# Patient Record
Sex: FEMALE | Race: BLACK OR AFRICAN AMERICAN | NOT HISPANIC OR LATINO | Employment: OTHER | URBAN - METROPOLITAN AREA
[De-identification: names, ages, dates, MRNs, and addresses within clinical notes are randomized per-mention and may not be internally consistent; named-entity substitution may affect disease eponyms.]

---

## 2022-07-10 ENCOUNTER — HOSPITAL ENCOUNTER (INPATIENT)
Facility: HOSPITAL | Age: 87
LOS: 5 days | Discharge: NURSING, REHAB OR LONG-TERM CARE | DRG: 389 | End: 2022-07-15
Attending: STUDENT IN AN ORGANIZED HEALTH CARE EDUCATION/TRAINING PROGRAM | Admitting: SURGERY
Payer: MEDICARE

## 2022-07-10 DIAGNOSIS — D72.829 LEUKOCYTOSIS, UNSPECIFIED TYPE: ICD-10-CM

## 2022-07-10 DIAGNOSIS — R79.89 ELEVATED TROPONIN: ICD-10-CM

## 2022-07-10 DIAGNOSIS — E87.1 HYPONATREMIA: ICD-10-CM

## 2022-07-10 DIAGNOSIS — K56.609 SBO (SMALL BOWEL OBSTRUCTION): ICD-10-CM

## 2022-07-10 DIAGNOSIS — I25.810 CORONARY ARTERY DISEASE INVOLVING CORONARY BYPASS GRAFT OF NATIVE HEART, UNSPECIFIED WHETHER ANGINA PRESENT: ICD-10-CM

## 2022-07-10 DIAGNOSIS — R00.0 TACHYARRHYTHMIA: ICD-10-CM

## 2022-07-10 DIAGNOSIS — E87.6 HYPOKALEMIA: ICD-10-CM

## 2022-07-10 DIAGNOSIS — R07.9 CHEST PAIN: ICD-10-CM

## 2022-07-10 DIAGNOSIS — I21.4 NSTEMI (NON-ST ELEVATED MYOCARDIAL INFARCTION): Primary | ICD-10-CM

## 2022-07-10 DIAGNOSIS — D64.9 NORMOCYTIC ANEMIA: ICD-10-CM

## 2022-07-10 LAB
ALBUMIN SERPL-MCNC: 3.6 GM/DL (ref 3.4–4.8)
ALBUMIN/GLOB SERPL: 1 RATIO (ref 1.1–2)
ALP SERPL-CCNC: 87 UNIT/L (ref 40–150)
ALT SERPL-CCNC: 27 UNIT/L (ref 0–55)
APTT PPP: 53.3 SECONDS
AST SERPL-CCNC: 32 UNIT/L (ref 5–34)
BASOPHILS # BLD AUTO: 0.02 X10(3)/MCL (ref 0–0.2)
BASOPHILS NFR BLD AUTO: 0.3 %
BILIRUBIN DIRECT+TOT PNL SERPL-MCNC: 0.7 MG/DL
BUN SERPL-MCNC: 17 MG/DL (ref 9.8–20.1)
CALCIUM SERPL-MCNC: 10.4 MG/DL (ref 8.4–10.2)
CHLORIDE SERPL-SCNC: 95 MMOL/L (ref 98–111)
CO2 SERPL-SCNC: 27 MMOL/L (ref 23–31)
CREAT SERPL-MCNC: 0.97 MG/DL (ref 0.55–1.02)
EOSINOPHIL # BLD AUTO: 0.06 X10(3)/MCL (ref 0–0.9)
EOSINOPHIL NFR BLD AUTO: 0.9 %
ERYTHROCYTE [DISTWIDTH] IN BLOOD BY AUTOMATED COUNT: 12.8 % (ref 11.5–17)
EST. AVERAGE GLUCOSE BLD GHB EST-MCNC: 99.7 MG/DL
GLOBULIN SER-MCNC: 3.5 GM/DL (ref 2.4–3.5)
GLUCOSE SERPL-MCNC: 110 MG/DL (ref 75–121)
HBA1C MFR BLD: 5.1 %
HCT VFR BLD AUTO: 32.2 % (ref 37–47)
HGB BLD-MCNC: 10.7 GM/DL (ref 12–16)
IMM GRANULOCYTES # BLD AUTO: 0.02 X10(3)/MCL (ref 0–0.04)
IMM GRANULOCYTES NFR BLD AUTO: 0.3 %
LACTATE SERPL-SCNC: 1 MMOL/L (ref 0.5–2.2)
LYMPHOCYTES # BLD AUTO: 1 X10(3)/MCL (ref 0.6–4.6)
LYMPHOCYTES NFR BLD AUTO: 15.6 %
MAGNESIUM SERPL-MCNC: 2.1 MG/DL (ref 1.6–2.6)
MCH RBC QN AUTO: 31.6 PG (ref 27–31)
MCHC RBC AUTO-ENTMCNC: 33.2 MG/DL (ref 33–36)
MCV RBC AUTO: 95 FL (ref 80–94)
MONOCYTES # BLD AUTO: 0.55 X10(3)/MCL (ref 0.1–1.3)
MONOCYTES NFR BLD AUTO: 8.6 %
NEUTROPHILS # BLD AUTO: 4.7 X10(3)/MCL (ref 2.1–9.2)
NEUTROPHILS NFR BLD AUTO: 74.3 %
NRBC BLD AUTO-RTO: 0 %
PHOSPHATE SERPL-MCNC: 3.7 MG/DL (ref 2.3–4.7)
PLATELET # BLD AUTO: 262 X10(3)/MCL (ref 130–400)
PMV BLD AUTO: 9.1 FL (ref 7.4–10.4)
POTASSIUM SERPL-SCNC: 4.1 MMOL/L (ref 3.5–5.1)
PROT SERPL-MCNC: 7.1 GM/DL (ref 5.8–7.6)
RBC # BLD AUTO: 3.39 X10(6)/MCL (ref 4.2–5.4)
SARS-COV-2 RDRP RESP QL NAA+PROBE: NEGATIVE
SODIUM SERPL-SCNC: 131 MMOL/L (ref 132–146)
TROPONIN I SERPL-MCNC: 0.17 NG/ML (ref 0–0.04)
TROPONIN I SERPL-MCNC: 0.19 NG/ML (ref 0–0.04)
TROPONIN I SERPL-MCNC: 0.2 NG/ML (ref 0–0.04)
WBC # SPEC AUTO: 6.4 X10(3)/MCL (ref 4.5–11.5)

## 2022-07-10 PROCEDURE — 25000003 PHARM REV CODE 250

## 2022-07-10 PROCEDURE — 63600175 PHARM REV CODE 636 W HCPCS: Performed by: STUDENT IN AN ORGANIZED HEALTH CARE EDUCATION/TRAINING PROGRAM

## 2022-07-10 PROCEDURE — 21400001 HC TELEMETRY ROOM

## 2022-07-10 PROCEDURE — 84484 ASSAY OF TROPONIN QUANT: CPT | Performed by: STUDENT IN AN ORGANIZED HEALTH CARE EDUCATION/TRAINING PROGRAM

## 2022-07-10 PROCEDURE — 25000242 PHARM REV CODE 250 ALT 637 W/ HCPCS: Performed by: STUDENT IN AN ORGANIZED HEALTH CARE EDUCATION/TRAINING PROGRAM

## 2022-07-10 PROCEDURE — 93005 ELECTROCARDIOGRAM TRACING: CPT

## 2022-07-10 PROCEDURE — 85730 THROMBOPLASTIN TIME PARTIAL: CPT | Performed by: STUDENT IN AN ORGANIZED HEALTH CARE EDUCATION/TRAINING PROGRAM

## 2022-07-10 PROCEDURE — C1751 CATH, INF, PER/CENT/MIDLINE: HCPCS

## 2022-07-10 PROCEDURE — 87635 SARS-COV-2 COVID-19 AMP PRB: CPT

## 2022-07-10 PROCEDURE — 83036 HEMOGLOBIN GLYCOSYLATED A1C: CPT | Performed by: STUDENT IN AN ORGANIZED HEALTH CARE EDUCATION/TRAINING PROGRAM

## 2022-07-10 PROCEDURE — 63600175 PHARM REV CODE 636 W HCPCS

## 2022-07-10 PROCEDURE — 84484 ASSAY OF TROPONIN QUANT: CPT

## 2022-07-10 PROCEDURE — 99291 CRITICAL CARE FIRST HOUR: CPT | Mod: 25

## 2022-07-10 PROCEDURE — 83735 ASSAY OF MAGNESIUM: CPT | Performed by: STUDENT IN AN ORGANIZED HEALTH CARE EDUCATION/TRAINING PROGRAM

## 2022-07-10 PROCEDURE — 94761 N-INVAS EAR/PLS OXIMETRY MLT: CPT

## 2022-07-10 PROCEDURE — 85610 PROTHROMBIN TIME: CPT | Performed by: STUDENT IN AN ORGANIZED HEALTH CARE EDUCATION/TRAINING PROGRAM

## 2022-07-10 PROCEDURE — 84100 ASSAY OF PHOSPHORUS: CPT | Performed by: STUDENT IN AN ORGANIZED HEALTH CARE EDUCATION/TRAINING PROGRAM

## 2022-07-10 PROCEDURE — 85025 COMPLETE CBC W/AUTO DIFF WBC: CPT | Performed by: STUDENT IN AN ORGANIZED HEALTH CARE EDUCATION/TRAINING PROGRAM

## 2022-07-10 PROCEDURE — 80053 COMPREHEN METABOLIC PANEL: CPT | Performed by: STUDENT IN AN ORGANIZED HEALTH CARE EDUCATION/TRAINING PROGRAM

## 2022-07-10 PROCEDURE — 36415 COLL VENOUS BLD VENIPUNCTURE: CPT | Performed by: STUDENT IN AN ORGANIZED HEALTH CARE EDUCATION/TRAINING PROGRAM

## 2022-07-10 PROCEDURE — 83605 ASSAY OF LACTIC ACID: CPT | Performed by: STUDENT IN AN ORGANIZED HEALTH CARE EDUCATION/TRAINING PROGRAM

## 2022-07-10 RX ORDER — ONDANSETRON 2 MG/ML
4 INJECTION INTRAMUSCULAR; INTRAVENOUS EVERY 6 HOURS PRN
Status: DISCONTINUED | OUTPATIENT
Start: 2022-07-10 | End: 2022-07-15 | Stop reason: HOSPADM

## 2022-07-10 RX ORDER — SODIUM CHLORIDE 0.9 % (FLUSH) 0.9 %
10 SYRINGE (ML) INJECTION EVERY 6 HOURS
Status: DISCONTINUED | OUTPATIENT
Start: 2022-07-11 | End: 2022-07-15 | Stop reason: HOSPADM

## 2022-07-10 RX ORDER — SODIUM CHLORIDE 0.9 % (FLUSH) 0.9 %
10 SYRINGE (ML) INJECTION EVERY 12 HOURS PRN
Status: DISCONTINUED | OUTPATIENT
Start: 2022-07-10 | End: 2022-07-15 | Stop reason: HOSPADM

## 2022-07-10 RX ORDER — FUROSEMIDE 10 MG/ML
40 INJECTION INTRAMUSCULAR; INTRAVENOUS DAILY
Status: DISCONTINUED | OUTPATIENT
Start: 2022-07-11 | End: 2022-07-10

## 2022-07-10 RX ORDER — NITROGLYCERIN 0.4 MG/1
0.4 TABLET SUBLINGUAL EVERY 5 MIN PRN
Status: DISCONTINUED | OUTPATIENT
Start: 2022-07-10 | End: 2022-07-12

## 2022-07-10 RX ORDER — MINOXIDIL 10 MG/1
2.5 TABLET ORAL DAILY
Status: ON HOLD | COMMUNITY
End: 2023-01-01

## 2022-07-10 RX ORDER — FUROSEMIDE 10 MG/ML
40 INJECTION INTRAMUSCULAR; INTRAVENOUS DAILY
Status: DISCONTINUED | OUTPATIENT
Start: 2022-07-10 | End: 2022-07-10

## 2022-07-10 RX ORDER — LOSARTAN POTASSIUM 25 MG/1
25 TABLET ORAL DAILY
COMMUNITY

## 2022-07-10 RX ORDER — FUROSEMIDE 40 MG/1
40 TABLET ORAL DAILY
Status: ON HOLD | COMMUNITY
End: 2023-01-01

## 2022-07-10 RX ORDER — FEXOFENADINE HCL AND PSEUDOEPHEDRINE HCI 180; 240 MG/1; MG/1
1 TABLET, EXTENDED RELEASE ORAL DAILY
Status: ON HOLD | COMMUNITY
End: 2023-01-01

## 2022-07-10 RX ORDER — SIMVASTATIN 40 MG/1
40 TABLET, FILM COATED ORAL NIGHTLY
COMMUNITY

## 2022-07-10 RX ORDER — HYDRALAZINE HYDROCHLORIDE 20 MG/ML
10 INJECTION INTRAMUSCULAR; INTRAVENOUS EVERY 6 HOURS PRN
Status: DISCONTINUED | OUTPATIENT
Start: 2022-07-10 | End: 2022-07-15 | Stop reason: HOSPADM

## 2022-07-10 RX ORDER — SODIUM CHLORIDE 9 MG/ML
INJECTION, SOLUTION INTRAVENOUS CONTINUOUS
Status: DISCONTINUED | OUTPATIENT
Start: 2022-07-10 | End: 2022-07-12

## 2022-07-10 RX ORDER — HEPARIN SODIUM 10000 [USP'U]/100ML
17 INJECTION, SOLUTION INTRAVENOUS CONTINUOUS
Status: DISCONTINUED | OUTPATIENT
Start: 2022-07-10 | End: 2022-07-11

## 2022-07-10 RX ORDER — SODIUM CHLORIDE 0.9 % (FLUSH) 0.9 %
10 SYRINGE (ML) INJECTION
Status: DISCONTINUED | OUTPATIENT
Start: 2022-07-10 | End: 2022-07-15 | Stop reason: HOSPADM

## 2022-07-10 RX ORDER — HEPARIN SODIUM,PORCINE/D5W 25000/250
0-40 INTRAVENOUS SOLUTION INTRAVENOUS CONTINUOUS
Status: DISCONTINUED | OUTPATIENT
Start: 2022-07-10 | End: 2022-07-11

## 2022-07-10 RX ADMIN — HYDRALAZINE HYDROCHLORIDE 10 MG: 20 INJECTION INTRAMUSCULAR; INTRAVENOUS at 08:07

## 2022-07-10 RX ADMIN — FUROSEMIDE 40 MG: 10 INJECTION, SOLUTION INTRAMUSCULAR; INTRAVENOUS at 04:07

## 2022-07-10 RX ADMIN — HEPARIN SODIUM 12 UNITS/KG/HR: 10000 INJECTION, SOLUTION INTRAVENOUS at 05:07

## 2022-07-10 RX ADMIN — NITROGLYCERIN 0.4 MG: 0.4 TABLET SUBLINGUAL at 11:07

## 2022-07-10 RX ADMIN — SODIUM CHLORIDE: 9 INJECTION, SOLUTION INTRAVENOUS at 11:07

## 2022-07-10 RX ADMIN — ONDANSETRON 4 MG: 2 INJECTION INTRAMUSCULAR; INTRAVENOUS at 09:07

## 2022-07-10 NOTE — H&P
[facility] Medicine Wards History & Physical Note     Resident Team: [facility] Medicine List 3  Attending Physician: Baltazar Ng Jr., *      Date of Admit: 7/10/2022    Chief Complaint     Transfer (Reports via [facility] from [facility] due to small bowel obstruction.)   for     Subjective:      89-year-old africain american  Female with PMH for CHF, CAD, HTN was transferred from an outside hospital to [facility] ED with small-bowel obstruction.  At the outside facility patient was found to have elevated troponin levels which we are not able to access.  Patient states that she has been having brown emesis and was unable to hold solid or liquid down. .  Patient states that she is still passing flatulencend had a bowel movement yesterday.  Patient denies fever, chills, light headedness, dizziness, chest pain, shortness a breath, abdominal pain, hematuria, hematochezia, or dysuria.      Past Medical History:    Hypertension  CHF  Glaucoma    Past Surgical History:    Total abdominal hysterectomy  Appendectomy  Cholecystectomy  CABG    Family History:  No family history on file.    Social History:      patient denies smoking for 40 years, no longer uses alcohol, and denies drug use    Allergies:  Review of patient's allergies indicates:  No Known Allergies    Home Medications:  Prior to Admission medications    Medication Sig Start Date End Date Taking? Authorizing Provider   fexofenadine-pseudoephedrine (ALLEGRA-D 24) 180-240 mg per 24 hr tablet Take 1 tablet by mouth once daily.    Historical Provider   furosemide (LASIX) 40 MG tablet Take 40 mg by mouth once daily.    Historical Provider   losartan (COZAAR) 25 MG tablet Take 25 mg by mouth once daily.    Historical Provider   minoxidiL (LONITEN) 10 MG Tab Take 2.5 mg by mouth once daily.    Historical Provider   simvastatin (ZOCOR) 40 MG tablet Take 40 mg by mouth every evening.    Historical Provider         Review of Systems:  Review of Systems     As above     Objective:    Last 24 Hour Vital Signs:  BP  Min: 111/78  Max: 162/64  Temp  Av.2 °F (36.8 °C)  Min: 98.2 °F (36.8 °C)  Max: 98.2 °F (36.8 °C)  Pulse  Av.6  Min: 84  Max: 106  Resp  Av.8  Min: 13  Max: 28  SpO2  Av.1 %  Min: 98 %  Max: 100 %  There is no height or weight on file to calculate BMI.  No intake/output data recorded.    Physical Examination:  Gen: NAD  HEENT: atraumatic, no scleral icterus  Neck: supple  Heart: RRR. No M/R/G.   Lungs: CTAB. No W/C/R.   Abd: soft, ND, NT. +BS  Extremities: No LE edema. Good pulses in all extremities.   MSK: No obvious deformities   Neuro: A,Ox3, responds to qns and follows simple commands appropriately.   Skin: No rash or wounds     Physical Exam     Laboratory:  Most Recent Data:  CBC:   Lab Results   Component Value Date    WBC 6.4 07/10/2022    HGB 10.7 (L) 07/10/2022    HCT 32.2 (L) 07/10/2022     07/10/2022    MCV 95.0 (H) 07/10/2022    RDW 12.8 07/10/2022     WBC Differential:   Recent Labs   Lab 07/10/22  0503   WBC 6.4   HGB 10.7*   HCT 32.2*      MCV 95.0*     BMP:   Lab Results   Component Value Date     (L) 07/10/2022    K 4.1 07/10/2022    CO2 27 07/10/2022    BUN 17.0 07/10/2022    CREATININE 0.97 07/10/2022    CALCIUM 10.4 (H) 07/10/2022    MG 2.10 07/10/2022    PHOS 3.7 07/10/2022     LFTs:   Lab Results   Component Value Date    ALBUMIN 3.6 07/10/2022    BILITOT 0.7 07/10/2022    AST 32 07/10/2022    ALKPHOS 87 07/10/2022    ALT 27 07/10/2022     Coags: No results found for: INR, PROTIME, PTT  FLP: No results found for: CHOL, HDL, LDLCALC, TRIG, CHOLHDL  DM:   Lab Results   Component Value Date    HGBA1C 5.1 07/10/2022    CREATININE 0.97 07/10/2022     Thyroid: No results found for: TSH, FREET4, M9LQMSH, W7XPIMA, THYROIDAB  Anemia: No results found for: IRON, TIBC, FERRITIN, YKYCSNMP92, FOLATE  Cardiac:   Lab Results   Component Value Date    TROPONINI 0.175 (H) 07/10/2022     Urinalysis: No results found for: LABURIN, COLORU,  PHUA, CLARITYU, SPECGRAV, LABSPEC, NITRITE, PROTEINUR, GLUCOSEU, KETONESU, UROBILINOGEN, BILIRUBINUR, BLOODU, RBCU, WBCUA    Trended Lab Data:  Recent Labs   Lab 07/10/22  0503   WBC 6.4   HGB 10.7*   HCT 32.2*      MCV 95.0*   RDW 12.8   *   K 4.1   CO2 27   BUN 17.0   CREATININE 0.97   ALBUMIN 3.6   BILITOT 0.7   AST 32   ALKPHOS 87   ALT 27       Trended Cardiac Data:  Recent Labs   Lab 07/10/22  0503 07/10/22  0949   TROPONINI 0.190* 0.175*           Radiology:  Imaging Results          X-Ray Abdomen AP 1 View (Final result)  Result time 07/10/22 09:26:56    Final result by Aurelio Wilkinson MD (07/10/22 09:26:56)                 Impression:      Nasogastric tube as above      Electronically signed by: Aurelio Wilkinson  Date:    07/10/2022  Time:    09:26             Narrative:    EXAMINATION:  XR ABDOMEN AP 1 VIEW    CLINICAL HISTORY:  tube placement;    TECHNIQUE:  AP X-RAY OF THE ABDOMEN:    CPT 63004    FINDINGS:  Examination reveals a nasogastric tube coiled over itself tip in the fundus of the stomach                                   Assessment & Plan:       Partial SBO  - managed by surgery team    NSTEMI  - 1.90  - trend troponin and EKG  - started heparin GTT    CODE STATUS:   Access: PIV none  Antibiotics: none  Diet: NPO  DVT Prophylaxis: heparin gtt  GI Prophylaxis:   Fluids:  NS

## 2022-07-10 NOTE — PT/OT/SLP PROGRESS
Physical Therapy  Missed Treatment Note    Patient Name:  Zackery Purdy   MRN:  [id number]    Patient not seen today secondary to Patient unwilling to participate, Other (Comment) (pt c/o fatigue and agreeable for therapy on following date). Will follow-up as scheduled.  Nurse kimberly notified.

## 2022-07-10 NOTE — CONSULTS
General/Acute Care Surgery   Consult     HD#0    HPI  89F transferred from OSH presenting w/ one day history of weakness, vomiting, epigastric abdominal pain, inability to keep solids/ liquids down. States that she had 2 solid BMs yesterday, and is presently still passing flatulence. Normally takes miralax and has daily bowel movements. OSH inserted NG tube, placed pt on maintenance fluids of 150mL/hr, administered 4 baby aspirin, and administered zofran for nausea. She states that she is ambulatory w/ RW and lives at home with brother. At this time, she is in no pain and denies fever/nausea/vomitting. Her last meal was 7/9/22.    Past Medical History:   HTN  CHF  Glaucoma  CABG    Surgical History:   Open MACARENA  Appendectomy  Cholecystectomy    Social History:   Remote alcohol and tobacco use (quit >40yrs ago)    Review of Systems: 10 point ROS negative except as stated in HPI    Allergies:  Amlodipine-urticaria    Home Meds:  Potassium qd 99mg  Allegra D qd 180-240mg   Furosemide qd 40mg  Simvastatin qd 40mg  Minoxidil qd 10mg  Losartan 25mg 4 tabs bid      Objective  Temp:  [98.2 °F (36.8 °C)] 98.2 °F (36.8 °C)  Pulse:  [] 106  Resp:  [14-27] 27  SpO2:  [99 %-100 %] 100 %  BP: (161-162)/(64-69) 162/64     No intake/output data recorded.  No intake/output data recorded.     GEN: NAD   NEURO: AAOx3   RESP: No increased WOB, equal rise and fall of the chest   ABD: Soft, non tender, non distended. No guarding or rebound. Midline incision scar present.   : Tolentino in place.   EXT: Moving all extremities spontaneously 2+ LLE edema 2/2 CABG graft harvest.     Labs  Recent Labs     07/10/22  0503   WBC 6.4   HGB 10.7*   HCT 32.2*        Recent Labs     07/10/22  0503   *   K 4.1   CO2 27   BUN 17.0   CREATININE 0.97   CALCIUM 10.4*   MG 2.10   PHOS 3.7   ALBUMIN 3.6   BILITOT 0.7   AST 32   ALKPHOS 87   ALT 27     From OSH:  Hb:10.9  WBC: 9.09  Na: 125  Troponin: 0.11 (0.12)  BUN: 18  Cr: 0.9  BNP:  "200      Imaging  CTabd/pelvis: Mechanical SBO with a zone of transition in the central to inferior midline abdomen. Extensive distal colonic diverticulosis and heavy atherosclerosis.      Assessment/Plan  89 y.o.female with NSTEMI, hyponatremia, and SBO.    - Continue NG tube on suction. Monitor drainage amount.  -NPO  - Order rolling walker and d/c Tolentino so that pt may ambulate to use restroom.  - Recommend admission to Medicine  - Will consider small bowel follow through after decompression    Kenroy Perez MD  [employer] General Surgery  7/10/2022  4:52 AM      PGY3 Addendum  Patient seen and examined with resident above.   Acute onset epigastric abdominal pain around 3pm yesterday followed by nausea/vomiting. She reports emesis looked "brown". Her last bowel movement yesterday prior to this episode. She previously had constipation for which she now takes miralax and is able to have daily soft bowel movements. She reports this feeling is different from the usual sensation of constipation.     NGT placed at outside hospital, stomach now decompressed on repeat abdominal film. 250ml gastric content in cannister.    Abdomen soft, NT  Prior surgical scars noted including lower midline    89 yo F with significant cardiac history presenting with elevated troponin and EKG changes. Patient also noted to have partial obstruction with transition point in the lower abdomen.     Continue NGT decompression  tentative plan for SBFT tomorrow.    Dilcia Sebastian    "

## 2022-07-10 NOTE — ED PROVIDER NOTES
Encounter Date: 7/10/2022       History     Chief Complaint   Patient presents with    Transfer     Reports via [facility] from [facility] due to small bowel obstruction.     89-year-old female presents to ED as transfer from outside hospital for SBO.  Patient states her only medical problems are hypertension and CHF. both well controlled on meds. States she felt fine until yesterday when she had multiple back-to-back episodes of nausea and vomiting.  Minimal and no abdominal pain.  At outside hospital she was tachycardic but otherwise vitals stable.  Gave single antiemetic which resolved nausea.  CT demonstrated SBO with transition point. NG tube placed. Labs demonstrate hyponatremia of 125, no leukocytosis, stable H&H, patient did have an elevated troponin initially of 0.12.  Performed Delta and down trending to 0.11. No chest pain or discomfort at any time. No shortness of breath, no fever, normal urination.  Aspirin given.  I was the accepting physician on the transfer call between myself, outside ER physician and our general surgeon Dr. Royal.  EMS states EN route patient was stable no intervention provided besides fluids.  On arrival pt states she feels tired but otherwise fine.  Not nauseated, continues to have no abdominal pain.  No other complaints or concerns at this time.        Review of patient's allergies indicates:  No Known Allergies  No past medical history on file.  No past surgical history on file.  No family history on file.     Review of Systems   Constitutional: Negative for chills, diaphoresis and fever.   HENT: Negative for congestion, rhinorrhea, sinus pain and sore throat.    Eyes: Negative for pain, discharge and itching.   Respiratory: Negative for cough, chest tightness and shortness of breath.    Cardiovascular: Negative for chest pain and palpitations.   Gastrointestinal: Negative for abdominal distention, abdominal pain, blood in stool, constipation, diarrhea, nausea, rectal pain and  vomiting.   Genitourinary: Negative for dysuria, flank pain and hematuria.   Musculoskeletal: Negative for back pain and myalgias.   Skin: Negative for color change and rash.   Neurological: Negative for dizziness, weakness and headaches.   Psychiatric/Behavioral: Negative for confusion. The patient is not hyperactive.        Physical Exam     Initial Vitals [07/10/22 0359]   BP Pulse Resp Temp SpO2   (!) 161/69 84 18 98.2 °F (36.8 °C) 99 %      MAP       --         Physical Exam    Vitals reviewed.  Constitutional: She appears well-developed and well-nourished. She is not diaphoretic. No distress.   HENT:   Head: Normocephalic and atraumatic.   Eyes: Conjunctivae and EOM are normal. Pupils are equal, round, and reactive to light.   Neck: Neck supple. No tracheal deviation present.   Normal range of motion.  Cardiovascular: Regular rhythm, normal heart sounds and intact distal pulses. Tachycardia present.    Pulmonary/Chest: Breath sounds normal. No respiratory distress.   Abdominal: Abdomen is soft and flat. There is no abdominal tenderness. There is no rebound, no guarding, no tenderness at McBurney's point and negative York's sign. negative Rovsing's sign  Musculoskeletal:         General: Normal range of motion.      Cervical back: Normal range of motion and neck supple.     Neurological: She is alert and oriented to person, place, and time. She has normal strength. GCS score is 15. GCS eye subscore is 4. GCS verbal subscore is 5. GCS motor subscore is 6.   Skin: Skin is warm and dry. Capillary refill takes less than 2 seconds. No rash noted.   Psychiatric: She has a normal mood and affect. Her behavior is normal. Judgment and thought content normal.         ED Course   Critical Care    Date/Time: 7/10/2022 4:57 PM  Performed by: Carlos Barreto MD  Authorized by: Carlos Barreto MD   Total critical care time (exclusive of procedural time) : 45 minutes  Critical care time was exclusive of separately billable  procedures and treating other patients.  Critical care was necessary to treat or prevent imminent or life-threatening deterioration of the following conditions: circulatory failure.  Critical care was time spent personally by me on the following activities: evaluation of patient's response to treatment, obtaining history from patient or surrogate, ordering and review of laboratory studies, pulse oximetry, review of old charts, development of treatment plan with patient or surrogate, examination of patient, ordering and performing treatments and interventions, ordering and review of radiographic studies and re-evaluation of patient's condition.  Comments: NSTEMI. Requested to update chart by EMR team. (anila)        Labs Reviewed   COMPREHENSIVE METABOLIC PANEL - Abnormal; Notable for the following components:       Result Value    Sodium Level 131 (*)     Chloride 95 (*)     Calcium Level Total 10.4 (*)     Albumin/Globulin Ratio 1.0 (*)     All other components within normal limits   CBC WITH DIFFERENTIAL - Abnormal; Notable for the following components:    RBC 3.39 (*)     Hgb 10.7 (*)     Hct 32.2 (*)     MCV 95.0 (*)     MCH 31.6 (*)     All other components within normal limits   MAGNESIUM - Normal   PHOSPHORUS - Normal   CBC W/ AUTO DIFFERENTIAL    Narrative:     The following orders were created for panel order CBC auto differential.  Procedure                               Abnormality         Status                     ---------                               -----------         ------                     CBC with Differential[[id number]]        Abnormal            Final result                 Please view results for these tests on the individual orders.   LACTIC ACID, PLASMA   TROPONIN I   EXTRA TUBES    Narrative:     The following orders were created for panel order EXTRA TUBES.  Procedure                               Abnormality         Status                     ---------                                -----------         ------                     Light Blue Top Hold[[id number]]                              In process                 Gold Top Hold[[id number]]                                    In process                   Please view results for these tests on the individual orders.   LIGHT BLUE TOP HOLD   GOLD TOP HOLD   EXTRA TUBES    Narrative:     The following orders were created for panel order EXTRA TUBES.  Procedure                               Abnormality         Status                     ---------                               -----------         ------                     Pink Top Hold[[id number]]                                    In process                   Please view results for these tests on the individual orders.   PINK TOP HOLD     EKG Readings: (Independently Interpreted)   Initial Reading: No STEMI. Rhythm: Sinus Tachycardia. Ectopy: No Ectopy. Conduction: Normal. Axis: Normal. Clinical Impression: Sinus Tachycardia Other Impression: mild ST depression inferior laterally       Imaging Results    None          Medications - No data to display  Medical Decision Making:   Clinical Tests:   Lab Tests: Reviewed and Ordered  Radiological Study: Reviewed and Ordered  Medical Tests: Reviewed and Ordered  ED Management:  Patient presents as transfer from OSH for SBO, NSTEMI, hyponatremia.  Given antiemetics fluids and aspirin there.  Accepted here by General surgery.  Upon patient arrival in no acute distress, comfortable, vital signs stable besides mild sinus tachycardia.  EKG shows no STEMI criteria but there is mild ST depression inferolaterally.  Outside hospital images uploaded demonstrate SBO with transition point.  Surgery notified and team presented bedside.  Medicine additionally notified secondary to comorbidities including downtrending troponin at outside hospital and hyponatremia. has NG tube in place on low intermittent suction.  Will transition care to surgical team. Remained stable at  this time. Updated family bedside (anila).             ED Course as of 07/10/22 0548   Sun Jul 10, 2022   0711 Spoke with both medicine then surgery. Both teams aware. [RZ]      ED Course User Index  [RZ] Carlos Barreto MD             Clinical Impression:   Final diagnoses:  [I21.4] NSTEMI (non-ST elevated myocardial infarction) (Primary)  [E87.1] Hyponatremia  [K56.609] SBO (small bowel obstruction)          ED Disposition Condition    Admit               Carlos Barreto MD  07/10/22 0552       Carlos Barreto MD  07/23/22 6520

## 2022-07-11 LAB
ALBUMIN SERPL-MCNC: 2.9 GM/DL (ref 3.4–4.8)
ALBUMIN/GLOB SERPL: 0.9 RATIO (ref 1.1–2)
ALP SERPL-CCNC: 70 UNIT/L (ref 40–150)
ALT SERPL-CCNC: 23 UNIT/L (ref 0–55)
ANION GAP SERPL CALC-SCNC: 9 MEQ/L
APPEARANCE UR: ABNORMAL
APTT PPP: 164.3 SECONDS
APTT PPP: 53.9 SECONDS
APTT PPP: >200 SECONDS
AST SERPL-CCNC: 29 UNIT/L (ref 5–34)
AV INDEX (PROSTH): 0.62
AV MEAN GRADIENT: 5 MMHG
AV PEAK GRADIENT: 10 MMHG
AV VALVE AREA: 2.4 CM2
AV VELOCITY RATIO: 0.69
BACTERIA #/AREA URNS AUTO: ABNORMAL /HPF
BASOPHILS # BLD AUTO: 0.02 X10(3)/MCL (ref 0–0.2)
BASOPHILS NFR BLD AUTO: 0.2 %
BILIRUB UR QL STRIP.AUTO: NEGATIVE MG/DL
BILIRUBIN DIRECT+TOT PNL SERPL-MCNC: 0.5 MG/DL
BSA FOR ECHO PROCEDURE: 1.9 M2
BUN SERPL-MCNC: 16.3 MG/DL (ref 9.8–20.1)
BUN SERPL-MCNC: 17.4 MG/DL (ref 9.8–20.1)
CALCIUM SERPL-MCNC: 9 MG/DL (ref 8.4–10.2)
CALCIUM SERPL-MCNC: 9.2 MG/DL (ref 8.4–10.2)
CAOX CRY URNS QL MICRO: ABNORMAL /HPF
CHLORIDE SERPL-SCNC: 99 MMOL/L (ref 98–111)
CHLORIDE SERPL-SCNC: 99 MMOL/L (ref 98–111)
CO2 SERPL-SCNC: 26 MMOL/L (ref 23–31)
CO2 SERPL-SCNC: 28 MMOL/L (ref 23–31)
COLOR UR AUTO: YELLOW
CREAT SERPL-MCNC: 0.75 MG/DL (ref 0.55–1.02)
CREAT SERPL-MCNC: 0.83 MG/DL (ref 0.55–1.02)
CREAT/UREA NIT SERPL: 21
DOP CALC AO PEAK VEL: 1.56 M/S
DOP CALC AO VTI: 31.2 CM
DOP CALC LVOT AREA: 3.9 CM2
DOP CALC LVOT DIAMETER: 2.23 CM
DOP CALC LVOT PEAK VEL: 1.07 M/S
DOP CALC LVOT STROKE VOLUME: 74.95 CM3
DOP CALC MV VTI: 24.3 CM
DOP CALCLVOT PEAK VEL VTI: 19.2 CM
E WAVE DECELERATION TIME: 160.07 MSEC
E/A RATIO: 0.65
E/E' RATIO: 15.67 M/S
EJECTION FRACTION: 70 %
EOSINOPHIL # BLD AUTO: 0.01 X10(3)/MCL (ref 0–0.9)
EOSINOPHIL NFR BLD AUTO: 0.1 %
ERYTHROCYTE [DISTWIDTH] IN BLOOD BY AUTOMATED COUNT: 13 % (ref 11.5–17)
FERRITIN SERPL-MCNC: 223.12 NG/ML (ref 4.63–204)
GLOBULIN SER-MCNC: 3.1 GM/DL (ref 2.4–3.5)
GLUCOSE SERPL-MCNC: 114 MG/DL (ref 75–121)
GLUCOSE SERPL-MCNC: 118 MG/DL (ref 75–121)
GLUCOSE UR QL STRIP.AUTO: NORMAL MG/DL
HCT VFR BLD AUTO: 28 % (ref 37–47)
HGB BLD-MCNC: 9.6 GM/DL (ref 12–16)
HYALINE CASTS #/AREA URNS LPF: ABNORMAL /LPF
IMM GRANULOCYTES # BLD AUTO: 0.07 X10(3)/MCL (ref 0–0.04)
IMM GRANULOCYTES NFR BLD AUTO: 0.6 %
IRON SATN MFR SERPL: 6 % (ref 20–50)
IRON SERPL-MCNC: 13 UG/DL (ref 50–170)
KETONES UR QL STRIP.AUTO: ABNORMAL MG/DL
LEFT ATRIUM SIZE: 4.23 CM
LEFT VENTRICLE DIASTOLIC VOLUME INDEX: 35.75 ML/M2
LEFT VENTRICLE DIASTOLIC VOLUME: 66.49 ML
LEFT VENTRICLE SYSTOLIC VOLUME INDEX: 11.5 ML/M2
LEFT VENTRICLE SYSTOLIC VOLUME: 21.48 ML
LEUKOCYTE ESTERASE UR QL STRIP.AUTO: 25 UNIT/L
LV LATERAL E/E' RATIO: 15.67 M/S
LV SEPTAL E/E' RATIO: 15.67 M/S
LVOT MG: 2.41 MMHG
LVOT MV: 0.71 CM/S
LYMPHOCYTES # BLD AUTO: 0.61 X10(3)/MCL (ref 0.6–4.6)
LYMPHOCYTES NFR BLD AUTO: 4.9 %
MAGNESIUM SERPL-MCNC: 1.7 MG/DL (ref 1.6–2.6)
MCH RBC QN AUTO: 32.3 PG (ref 27–31)
MCHC RBC AUTO-ENTMCNC: 34.3 MG/DL (ref 33–36)
MCV RBC AUTO: 94.3 FL (ref 80–94)
MONOCYTES # BLD AUTO: 0.9 X10(3)/MCL (ref 0.1–1.3)
MONOCYTES NFR BLD AUTO: 7.2 %
MUCOUS THREADS URNS QL MICRO: ABNORMAL /LPF
MV MEAN GRADIENT: 5 MMHG
MV PEAK A VEL: 1.45 M/S
MV PEAK E VEL: 0.94 M/S
MV PEAK GRADIENT: 10 MMHG
MV STENOSIS PRESSURE HALF TIME: 46.42 MS
MV VALVE AREA BY CONTINUITY EQUATION: 3.08 CM2
MV VALVE AREA P 1/2 METHOD: 4.74 CM2
NEUTROPHILS # BLD AUTO: 10.9 X10(3)/MCL (ref 2.1–9.2)
NEUTROPHILS NFR BLD AUTO: 87 %
NITRITE UR QL STRIP.AUTO: NEGATIVE
NRBC BLD AUTO-RTO: 0 %
PH UR STRIP.AUTO: 5.5 [PH]
PHOSPHATE SERPL-MCNC: 3.7 MG/DL (ref 2.3–4.7)
PLATELET # BLD AUTO: 237 X10(3)/MCL (ref 130–400)
PMV BLD AUTO: 9.2 FL (ref 7.4–10.4)
POTASSIUM SERPL-SCNC: 3.2 MMOL/L (ref 3.5–5.1)
POTASSIUM SERPL-SCNC: 4.1 MMOL/L (ref 3.5–5.1)
PROT SERPL-MCNC: 6 GM/DL (ref 5.8–7.6)
PROT UR QL STRIP.AUTO: ABNORMAL MG/DL
RA PRESSURE: 8 MMHG
RBC # BLD AUTO: 2.97 X10(6)/MCL (ref 4.2–5.4)
RBC UR QL AUTO: ABNORMAL UNIT/L
RIGHT VENTRICULAR END-DIASTOLIC DIMENSION: 2.92 CM
SODIUM SERPL-SCNC: 136 MMOL/L (ref 132–146)
SODIUM SERPL-SCNC: 138 MMOL/L (ref 132–146)
SP GR UR STRIP.AUTO: 1.01
SQUAMOUS #/AREA URNS LPF: ABNORMAL /HPF
TDI LATERAL: 0.06 M/S
TDI SEPTAL: 0.06 M/S
TDI: 0.06 M/S
TIBC SERPL-MCNC: 214 UG/DL (ref 70–310)
TIBC SERPL-MCNC: 227 UG/DL (ref 250–450)
TRANSFERRIN SERPL-MCNC: 196 MG/DL
TROPONIN I SERPL-MCNC: 0.5 NG/ML (ref 0–0.04)
TROPONIN I SERPL-MCNC: 0.59 NG/ML (ref 0–0.04)
TROPONIN I SERPL-MCNC: 0.74 NG/ML (ref 0–0.04)
UROBILINOGEN UR STRIP-ACNC: NORMAL MG/DL
WBC # SPEC AUTO: 12.5 X10(3)/MCL (ref 4.5–11.5)
WBC #/AREA URNS AUTO: >100 /HPF
YEAST BUDDING URNS QL: ABNORMAL /HPF

## 2022-07-11 PROCEDURE — 85730 THROMBOPLASTIN TIME PARTIAL: CPT | Performed by: STUDENT IN AN ORGANIZED HEALTH CARE EDUCATION/TRAINING PROGRAM

## 2022-07-11 PROCEDURE — 82728 ASSAY OF FERRITIN: CPT | Performed by: STUDENT IN AN ORGANIZED HEALTH CARE EDUCATION/TRAINING PROGRAM

## 2022-07-11 PROCEDURE — 84100 ASSAY OF PHOSPHORUS: CPT | Performed by: STUDENT IN AN ORGANIZED HEALTH CARE EDUCATION/TRAINING PROGRAM

## 2022-07-11 PROCEDURE — 25000003 PHARM REV CODE 250: Performed by: STUDENT IN AN ORGANIZED HEALTH CARE EDUCATION/TRAINING PROGRAM

## 2022-07-11 PROCEDURE — A4216 STERILE WATER/SALINE, 10 ML: HCPCS | Performed by: STUDENT IN AN ORGANIZED HEALTH CARE EDUCATION/TRAINING PROGRAM

## 2022-07-11 PROCEDURE — C9113 INJ PANTOPRAZOLE SODIUM, VIA: HCPCS | Performed by: STUDENT IN AN ORGANIZED HEALTH CARE EDUCATION/TRAINING PROGRAM

## 2022-07-11 PROCEDURE — 36415 COLL VENOUS BLD VENIPUNCTURE: CPT | Performed by: STUDENT IN AN ORGANIZED HEALTH CARE EDUCATION/TRAINING PROGRAM

## 2022-07-11 PROCEDURE — 25000003 PHARM REV CODE 250

## 2022-07-11 PROCEDURE — 80053 COMPREHEN METABOLIC PANEL: CPT | Performed by: STUDENT IN AN ORGANIZED HEALTH CARE EDUCATION/TRAINING PROGRAM

## 2022-07-11 PROCEDURE — 63600175 PHARM REV CODE 636 W HCPCS

## 2022-07-11 PROCEDURE — 81001 URINALYSIS AUTO W/SCOPE: CPT | Performed by: STUDENT IN AN ORGANIZED HEALTH CARE EDUCATION/TRAINING PROGRAM

## 2022-07-11 PROCEDURE — 63600175 PHARM REV CODE 636 W HCPCS: Performed by: STUDENT IN AN ORGANIZED HEALTH CARE EDUCATION/TRAINING PROGRAM

## 2022-07-11 PROCEDURE — 83735 ASSAY OF MAGNESIUM: CPT | Performed by: STUDENT IN AN ORGANIZED HEALTH CARE EDUCATION/TRAINING PROGRAM

## 2022-07-11 PROCEDURE — 36415 COLL VENOUS BLD VENIPUNCTURE: CPT | Performed by: SURGERY

## 2022-07-11 PROCEDURE — 25000003 PHARM REV CODE 250: Performed by: SURGERY

## 2022-07-11 PROCEDURE — 87088 URINE BACTERIA CULTURE: CPT | Performed by: STUDENT IN AN ORGANIZED HEALTH CARE EDUCATION/TRAINING PROGRAM

## 2022-07-11 PROCEDURE — 84484 ASSAY OF TROPONIN QUANT: CPT | Performed by: STUDENT IN AN ORGANIZED HEALTH CARE EDUCATION/TRAINING PROGRAM

## 2022-07-11 PROCEDURE — A4216 STERILE WATER/SALINE, 10 ML: HCPCS

## 2022-07-11 PROCEDURE — 83540 ASSAY OF IRON: CPT | Performed by: STUDENT IN AN ORGANIZED HEALTH CARE EDUCATION/TRAINING PROGRAM

## 2022-07-11 PROCEDURE — C9113 INJ PANTOPRAZOLE SODIUM, VIA: HCPCS

## 2022-07-11 PROCEDURE — 94761 N-INVAS EAR/PLS OXIMETRY MLT: CPT

## 2022-07-11 PROCEDURE — 97162 PT EVAL MOD COMPLEX 30 MIN: CPT

## 2022-07-11 PROCEDURE — 21400001 HC TELEMETRY ROOM

## 2022-07-11 PROCEDURE — 85025 COMPLETE CBC W/AUTO DIFF WBC: CPT | Performed by: STUDENT IN AN ORGANIZED HEALTH CARE EDUCATION/TRAINING PROGRAM

## 2022-07-11 PROCEDURE — 97166 OT EVAL MOD COMPLEX 45 MIN: CPT

## 2022-07-11 PROCEDURE — 85730 THROMBOPLASTIN TIME PARTIAL: CPT | Performed by: SURGERY

## 2022-07-11 RX ORDER — POTASSIUM CHLORIDE 7.45 MG/ML
10 INJECTION INTRAVENOUS
Status: DISCONTINUED | OUTPATIENT
Start: 2022-07-11 | End: 2022-07-11

## 2022-07-11 RX ORDER — POTASSIUM CHLORIDE 20 MEQ/1
40 TABLET, EXTENDED RELEASE ORAL ONCE
Status: DISCONTINUED | OUTPATIENT
Start: 2022-07-11 | End: 2022-07-11

## 2022-07-11 RX ORDER — POTASSIUM CHLORIDE 7.45 MG/ML
20 INJECTION INTRAVENOUS ONCE
Status: DISCONTINUED | OUTPATIENT
Start: 2022-07-11 | End: 2022-07-11

## 2022-07-11 RX ORDER — MUPIROCIN 20 MG/G
OINTMENT TOPICAL 2 TIMES DAILY
Status: DISCONTINUED | OUTPATIENT
Start: 2022-07-11 | End: 2022-07-15 | Stop reason: HOSPADM

## 2022-07-11 RX ORDER — POTASSIUM CHLORIDE 7.45 MG/ML
10 INJECTION INTRAVENOUS
Status: COMPLETED | OUTPATIENT
Start: 2022-07-11 | End: 2022-07-11

## 2022-07-11 RX ORDER — ACETAMINOPHEN 500 MG
500 TABLET ORAL ONCE
Status: COMPLETED | OUTPATIENT
Start: 2022-07-11 | End: 2022-07-11

## 2022-07-11 RX ORDER — ASPIRIN 81 MG/1
81 TABLET ORAL DAILY
Status: DISCONTINUED | OUTPATIENT
Start: 2022-07-12 | End: 2022-07-15 | Stop reason: HOSPADM

## 2022-07-11 RX ORDER — PANTOPRAZOLE SODIUM 40 MG/10ML
40 INJECTION, POWDER, LYOPHILIZED, FOR SOLUTION INTRAVENOUS 2 TIMES DAILY
Status: DISCONTINUED | OUTPATIENT
Start: 2022-07-11 | End: 2022-07-14

## 2022-07-11 RX ORDER — MAGNESIUM SULFATE 1 G/100ML
1 INJECTION INTRAVENOUS ONCE
Status: COMPLETED | OUTPATIENT
Start: 2022-07-11 | End: 2022-07-11

## 2022-07-11 RX ADMIN — SODIUM CHLORIDE: 9 INJECTION, SOLUTION INTRAVENOUS at 12:07

## 2022-07-11 RX ADMIN — HYDRALAZINE HYDROCHLORIDE 10 MG: 20 INJECTION INTRAMUSCULAR; INTRAVENOUS at 04:07

## 2022-07-11 RX ADMIN — MUPIROCIN: 20 OINTMENT TOPICAL at 08:07

## 2022-07-11 RX ADMIN — SODIUM CHLORIDE: 9 INJECTION, SOLUTION INTRAVENOUS at 03:07

## 2022-07-11 RX ADMIN — POTASSIUM CHLORIDE 10 MEQ: 7.45 INJECTION INTRAVENOUS at 06:07

## 2022-07-11 RX ADMIN — HYDRALAZINE HYDROCHLORIDE 10 MG: 20 INJECTION INTRAMUSCULAR; INTRAVENOUS at 11:07

## 2022-07-11 RX ADMIN — POTASSIUM CHLORIDE 10 MEQ: 7.45 INJECTION INTRAVENOUS at 01:07

## 2022-07-11 RX ADMIN — POTASSIUM CHLORIDE 10 MEQ: 7.45 INJECTION INTRAVENOUS at 11:07

## 2022-07-11 RX ADMIN — PANTOPRAZOLE SODIUM 40 MG: 40 INJECTION, POWDER, FOR SOLUTION INTRAVENOUS at 08:07

## 2022-07-11 RX ADMIN — POTASSIUM CHLORIDE 10 MEQ: 7.45 INJECTION INTRAVENOUS at 08:07

## 2022-07-11 RX ADMIN — TAZOBACTAM SODIUM AND PIPERACILLIN SODIUM 4.5 G: 500; 4 INJECTION, SOLUTION INTRAVENOUS at 07:07

## 2022-07-11 RX ADMIN — MAGNESIUM SULFATE IN DEXTROSE 1 G: 10 INJECTION, SOLUTION INTRAVENOUS at 08:07

## 2022-07-11 RX ADMIN — SODIUM CHLORIDE 125 MG: 9 INJECTION, SOLUTION INTRAVENOUS at 03:07

## 2022-07-11 RX ADMIN — SODIUM CHLORIDE, PRESERVATIVE FREE 10 ML: 5 INJECTION INTRAVENOUS at 05:07

## 2022-07-11 RX ADMIN — SODIUM CHLORIDE, PRESERVATIVE FREE 10 ML: 5 INJECTION INTRAVENOUS at 12:07

## 2022-07-11 RX ADMIN — TAZOBACTAM SODIUM AND PIPERACILLIN SODIUM 4.5 G: 500; 4 INJECTION, SOLUTION INTRAVENOUS at 11:07

## 2022-07-11 RX ADMIN — POTASSIUM CHLORIDE 10 MEQ: 7.45 INJECTION INTRAVENOUS at 07:07

## 2022-07-11 RX ADMIN — POTASSIUM CHLORIDE 10 MEQ: 7.45 INJECTION INTRAVENOUS at 10:07

## 2022-07-11 RX ADMIN — ACETAMINOPHEN 500 MG: 500 TABLET ORAL at 11:07

## 2022-07-11 NOTE — PT/OT/SLP EVAL
"Occupational Therapy   Evaluation    Name: Zackery Purdy  MRN: [id number]  Admitting Diagnosis:    Patient Active Problem List   Diagnosis    SBO (small bowel obstruction)    NSTEMI (non-ST elevated myocardial infarction)    Hyponatremia    Elevated troponin    Tachyarrhythmia    Hypokalemia    Normocytic anemia    Coronary artery disease involving coronary bypass graft of native heart       Recent Surgery: * No surgery found *      Recommendations:     Discharge Recommendations: nursing facility, skilled  Discharge Equipment Recommendations:  none  Barriers to discharge:  None    Assessment:     Zackery Purdy is a 89 y.o. female with a medical diagnosis of   Patient Active Problem List   Diagnosis    SBO (small bowel obstruction)    NSTEMI (non-ST elevated myocardial infarction)    Hyponatremia    Elevated troponin    Tachyarrhythmia    Hypokalemia    Normocytic anemia    Coronary artery disease involving coronary bypass graft of native heart   .  She presents with functional decline. Performance deficits affecting function: weakness, impaired endurance, impaired functional mobilty, impaired balance, impaired self care skills.      Rehab Prognosis: Good; patient would benefit from acute skilled OT services to address these deficits and reach maximum level of function.       Plan:     Patient to be seen  (2-5 times per week, M-F) to address the above listed problems via self-care/home management, therapeutic activities, therapeutic exercises  · Plan of Care Expires:  (D/C)  · Plan of Care Reviewed with: patient    Subjective     Chief Complaint: pain  Patient/Family Comments/goals: none stated    Occupational Profile:  Living Environment:  Pt lives with her brother, in home, 3 steps to enter, B rails.  Pt does have walk in shower with rails and shower chair.  Previous level of function: Pt reported she doesn't drive, does require "little" assistance with self care skills, family A with home making " tasks, meal prep.  She stated she doesn't go grocery shopping.  Equipment Used at Home:  walker, rolling, bedside commode, shower chair, cane, straight  Assistance upon Discharge: family as needed    Pain/Comfort:  · Pain Rating 1: 2/10  · Location - Side 1: Left  · Location - Orientation 1: lower  · Location 1: leg  · Pain Addressed 1: Nurse notified  · Pain Rating Post-Intervention 1: 2/10  · Location 2:  (NG tube)  · Pain Addressed 2: Nurse notified    Patients cultural, spiritual, Restorationist conflicts given the current situation: no    Objective:     Communicated with: nurse Quinteros prior to session.  Patient found supine with NG tube, telemetry, peripheral IV upon OT entry to room.    General Precautions: Standard, fall   Orthopedic Precautions:N/A   Braces: N/A  Respiratory Status: Room air    Occupational Performance:    Bed Mobility:    · Patient completed Rolling/Turning to Left with  minimum assistance  · Patient completed Rolling/Turning to Right with minimum assistance  · Patient completed Scooting with minimum assistance while standing and use of RW  · Patient completed Supine to Sit with minimum assistance  · Patient completed Sit to Supine with minimum assistance    Functional Mobility/Transfers:  · Patient completed Sit <> Stand Transfer with minimum assistance  with  rolling walker     Activities of Daily Living:  · Lower Body Dressing: maximal assistance seated on EOB, donning B slipper socks    Cognitive/Visual Perceptual:  Visual/Perceptual:      -pt reported she has glaucoma .    Physical Exam:  Balance:  Pt with I static sitting balance, mod I with dynamic sitting balance.  Pt does require RW for dynamic standing balance.  Dominant hand:  R  Upper Extremity Strength:    -       Right Upper Extremity: WFL  -       Left Upper Extremity: WFL    Education:    Patient left sitting edge of bed with all lines intact, call button in reach and nurse Quinteros notified    GOALS:   Multidisciplinary  Problems     Occupational Therapy Goals        Problem: Occupational Therapy    Goal Priority Disciplines Outcome Interventions   Occupational Therapy Goal     OT, PT/OT Ongoing, Progressing    Description: Goals to be met by: d/c    Patient will increase functional independence with ADLs by performing:    LE Dressing with Modified [place].    Grooming while standing with Modified [place].    Rolling to Bilateral with [place].     Supine to sit with [place].    Stand pivot transfers with Modified [place].                     History:     No past medical history on file.    No past surgical history on file.    Time Tracking:     OT Date of Treatment: 07/11/22  OT Start Time: 0913  OT Stop Time: 0938  OT Total Time (min): 25 min    Billable Minutes:Evaluation 25 min    7/11/2022

## 2022-07-11 NOTE — PT/OT/SLP EVAL
Physical Therapy Evaluation    Patient Name:  Zackery Purdy   MRN:  [id number]    Recommendations:     Discharge Recommendations:  other (see comments) (home w/ responsible caregiver w/ HH)   Discharge Equipment Recommendations: shower chair, walker, rolling   Barriers to discharge: None    Assessment:     Zackery Purdy is a 89 y.o. female admitted with a medical diagnosis of    Partial SBO  NSTEMI Type II   Leukocytosis  Hypokalemia   Normocytic Anemia    Patient Active Problem List   Diagnosis    SBO (small bowel obstruction)    NSTEMI (non-ST elevated myocardial infarction)    Hyponatremia    Elevated troponin    Tachyarrhythmia    Hypokalemia    Normocytic anemia    Coronary artery disease involving coronary bypass graft of native heart     She presents with the following impairments/functional limitations:  weakness, impaired endurance, impaired self care skills, impaired functional mobilty, gait instability, impaired balance.    Rehab Prognosis: Good; patient would benefit from acute skilled PT services to address these deficits and reach maximum level of function.    -continued supervised/assisted OOB and edge of bed side steps w/ progression to out-of-room ambulation w/ RW w/ progression as tolerated/appropriate    Recent Surgery: * No surgery found *      Plan:     During this hospitalization, patient to be seen  (3-5 TIMES/WEEK) to address the identified rehab impairments via gait training, therapeutic activities, therapeutic exercises and progress toward the following goals:    · Plan of Care Expires:       Subjective     Chief Complaint: pain complaints as documented  Patient/Family Comments/goals: return home to OF  Pain/Comfort:  · Pain Rating 1: 2/10 (throat and Lt foot - pre-activity)  · Pain Addressed 1: Nurse notified  · Pain Rating Post-Intervention 1: 2/10 (post-activity)    Patients cultural, spiritual, Taoist conflicts given the current situation: no    Living Environment:  -home  w/ pt's brother in single level home w/ 2 steps outside w/ bilat handrail w/ walk-in-shower.    Prior to admission, patients level of function was intermittent assist w/ ADL's.  Equipment used at home: rolling walker and shower chair.  DME owned (not currently used): single point cane and bedside commode.  Upon discharge, patient will have assistance from pt's brother.    -patient denied - extremity tingling/numbness, lightheadedness/dizziness, swallowing difficulty (previous)    -patient complaints of pain - as documented and vision impairment    -patient is Rt hand dominant    Objective:     Communicated with pt's nurse Neli prior to session.  Patient found supine in bed w/ HOB elevated w/ HOB rails up with telemetry, peripheral IV, NG tube  upon PT entry to room.    OT Jamia and PM&R TECH present for evaluation treatment session.    General Precautions: Standard, other (see comments) (standard)   Orthopedic Precautions:Full weight bearing   Braces: N/A  Respiratory Status: Room air    Exams:  · Cognitive Exam:  Patient is oriented to Person, Place, Time and Situation  · Gross Motor Coordination:  bilat LE - toe taps - WFL's  · Sensation:    · -       Intact  light/touch bilat LE - distal feet and toes  · RLE ROM: WFL  · RLE Strength: WFL  · LLE ROM: WFL  · LLE Strength: WFL    Functional Mobility:  · Bed Mobility:     · Rolling Right: supervision  · Scooting: contact guard assistance  · Supine to Sit: contact guard assistance  -sat on edge of bed  -donned 2nd back gown  -pt's nurse Neli into room to draw lab from pt's PICC Rt UE  · Transfers:     · Sit to Stand:  contact guard assistance with rolling walker  · Gait: contact guard assistance w/ rolling walker  · -side steps to HOB (pt's Rt) x3ft  · -slowed/guarded mvmts  · -decreased bilat step length  · -assist w/ rolling walker for advancement    *CNA and PM&R TECH assisted w/ bed sheet change during standing activities    Balance Training  Static  Sitting:  Patient performed static sitting on level surface  using no device with [place] and no verbal cues.     Dynamic Sitting:  Patient performed dynamic sitting on level surface using no device with [place] and no verbal cues during moderate excursions.    Balance Training  Static Standing:  Patient performed static standing on level surface  using rolling walker with Modified [place] and no verbal cues.      07/11/22  Supine  Pre-activity 07/11/22  Sitting edge of bed  post-activity   BP: 159/67 118/70   Pulse: 83 65   SpO2: 76% 85%         Patient left sitting edge of bed with all lines intact, call button in reach, pt's nurse Neli notified and tray table at bedside.    GOALS:   Multidisciplinary Problems     Physical Therapy Goals        Problem: Physical Therapy    Goal Priority Disciplines Outcome Goal Variances Interventions   Physical Therapy Goal     PT, PT/OT      Description: Goals to be met by: DISCHARGE     Patient will increase functional independence with mobility by performing:    -. Supine to sit with Stand-by Assistance  -. Sit to supine with Stand-by Assistance  -. Rolling to Left and Right with Modified [place]  -. Sit to stand transfer with Supervision  -. Gait  x 65ft x2 with Supervision using Rolling Walker  -. Ascend/descend 3 stair with bilateral Handrails Supervision using No Assistive Device                     History:     No past medical history on file.    No past surgical history on file.    Time Tracking:     PT Received On: 07/11/22  PT Start Time: 0903     PT Stop Time: 0931  PT Total Time (min): 28 min     Billable Minutes: Evaluation 28 07/11/2022

## 2022-07-11 NOTE — PROGRESS NOTES
[facility] - Purple Team  Daily Progress Note    Patient ID: Zackery Purdy     [facility] Day: 2    Subjective:  Pt reports that she is still passing flatulence as of this morning, but no BMs. She noted that she had some epigastric pain earlier in the night but it has since resolved. She did have an episode of non-bloody small amount of emesis last night as well.    Objective:  Problem List:  Patient Active Problem List   Diagnosis    SBO (small bowel obstruction)       Vitals:  Temp:  [98.2 °F (36.8 °C)-99.5 °F (37.5 °C)] 99.5 °F (37.5 °C)  Pulse:  [] 109  Resp:  [13-28] 20  SpO2:  [94 %-100 %] 97 %  BP: ()/(47-85) 169/68      Labs:  Recent Labs   Lab 07/10/22  0503 07/11/22  0411   HGB 10.7* 9.6*   WBC 6.4 12.5*    237   BUN 17.0 16.3   CREATININE 0.97 0.75   MG 2.10  --    K 4.1 3.2*   }  Troponins: 0.594 (0.190)    Physical Exam:  Gen: NAD  Neuro: awake, alert, answering questions appropriately  Resp: non-labored breathing, INDIGO  Abd: soft, ND, NT  Ext: moves all 4 spontaneously and purposefully  Skin: warm, well perfused    Imaging:  CTabd/pelvis: Mechanical SBO with a zo`ne of transition in the central to inferior midline abdomen. Extensive distal colonic diverticulosis and heavy atherosclerosis.   CXR 7/10/22: PICC line and NG tube in proper placement      Active Medications:    Current Facility-Administered Medications:     0.9%  NaCl infusion, , Intravenous, Continuous, Kenroy Perez MD, Last Rate: 75 mL/hr at 07/11/22 0031, New Bag at 07/11/22 0031    hydrALAZINE injection 10 mg, 10 mg, Intravenous, Q6H PRN, Kenroy Perez MD, 10 mg at 07/11/22 0432    mupirocin 2 % ointment, , Nasal, BID, Baltazar Ng Jr., MD    nitroGLYCERIN SL tablet 0.4 mg, 0.4 mg, Sublingual, Q5 Min PRN, Mekha Octavio, DO, 0.4 mg at 07/10/22 2323    ondansetron injection 4 mg, 4 mg, Intravenous, Q6H PRN, Kenroy Perez MD, 4 mg at 07/10/22 2116    potassium chloride 10 mEq in 100 mL IVPB, 10  mEq, Intravenous, Q1H, Radames Loomis, DO    sodium chloride 0.9% flush 10 mL, 10 mL, Intravenous, Q12H PRN, Pavan Cunningham, DO    Flushing PICC Protocol, , , Until Discontinued **AND** sodium chloride 0.9% flush 10 mL, 10 mL, Intravenous, Q6H, 10 mL at 07/11/22 0522 **AND** sodium chloride 0.9% flush 10 mL, 10 mL, Intravenous, PRN, Qiana Iglesias MD     Assessment/Plan:  89 y.o.female with NSTEMI, SBO.    - Spoke with medicine about calling cardiology about most recent troponin of 0.594.   - Put in inpt consult to cardiology  - continue NG tube to suction, NPO.    Kenroy Perez MD   [facility], PGY1  07/11/2022 6:22 AM       PGY3 addendum  Overnight patient had acute elevation in heart rate up to 120s with new onset epigastric/substernal chest pain. A repeat EKG showed worsening ST depression. The medicine team evaluated the patient bedside and notified cardiology. This morning her pain has resolved and HR in 100s. However troponin elevated up to 0.594    NGT output >1.5L, still passing gas, no BM. Abdomen soft, NT    Added protonix 40mg IV BID  IV Electrolyte replacements to goal K>4, Phos>3, Mg>2  Will continue decompression in light of cardiac issues  Discussed plan with medicine team this morning, they planned to notify cardiology. Appreciate assistance    Dilcia Sebastian

## 2022-07-11 NOTE — PROGRESS NOTES
[facility] INTERNAL MEDICINE  INPATIENT PROGRESS NOTE    Resident Team: [facility] Medicine List 3  Attending Physician: Dr. Ramiro Carpenter MD    SUBJECTIVE:      HPI: Ms. Purdy is a 89-year-old africain american  Female with PMH for CHF (unquantifed), CAD s/p CABG, HTN was transferred from an outside hospital to [facility] ED with small-bowel obstruction on 07/10/22.  At the outside facility patient was found to have elevated troponin levels, howver the outside medical records were unavailable. Patient states that she has been having brown emesis and was unable to hold solid or liquid down. .  Patient states that she is still passing flatulence had a bowel movement the day before admission. Patient denied fever, chills, light headedness, dizziness, chest pain, shortness a breath, abdominal pain, hematuria, hematochezia, or dysuria.    Today: Overnight, the patient had an episode of chest pain and an EKG showed ST depressions in the inferior leads and V2, V3, V4, V5, V6, and AVL with no ST segment elevations. The depressions were similar to the prior EKG yesterday. [facility] was consulted for recommended trending troponins and have cardiology evaluate today. This morning she reports no chest pain. She states her mouth is dry and would like some swabs to wet her mouth.    ROS:   CONSTITUTIONAL: No weight loss, subjective fever, chills, weakness or fatigue.  HEENT: Eyes: No visual loss, blurred vision, double vision or yellow sclerae. Ears, Nose, + dry mouth Throat: No hearing loss, sneezing, congestion, runny nose or sore throat.  SKIN: No rash or itching.  CARDIOVASCULAR: No chest pain, chest pressure or chest discomfort. No palpitations or edema.  RESPIRATORY: No shortness of breath, cough or sputum.  GASTROINTESTINAL: No anorexia, nausea, vomiting or diarrhea. No abdominal pain or blood.  GENITOURINARY: No dysuria, hematuria, or incontinence  NEUROLOGICAL: No headache, dizziness, syncope, paralysis, ataxia, numbness or tingling in the  "extremities. No change in bowel or bladder control.  MUSCULOSKELETAL: No muscle, back pain, joint pain or stiffness.  HEMATOLOGIC: No anemia, bleeding or bruising.  LYMPHATICS: No enlarged nodes.  PSYCHIATRIC: No history of depression or anxiety.  ENDOCRINOLOGIC: No reports of sweating, cold or heat intolerance. No polyuria or polydipsia.  ALLERGIES: No history of asthma, hives, eczema or rhinitis.         OBJECTIVE:     Vital signs:   BP (!) 169/68   Pulse 109   Temp 99.5 °F (37.5 °C) (Oral)   Resp 20   Ht 5' 5" (1.651 m)   Wt 79.1 kg (174 lb 6.1 oz)   SpO2 97%   BMI 29.02 kg/m²      Physical Examination:  General: elderly w/o distress  HEENT: NC/AT; PERRLA; nasal and oral mucosa moist and clear; no sinus tenderness; no thyromegaly, NG tube in place on suction  Neck: Full ROM; no lymphadenopathy  Pulm: CTA bilaterally, normal work of breathing  CV: S1, S2 w/o murmurs or gallops; no edema noted  GI: Soft with normal bowel sounds in all quadrants, no masses on palpation, hyperactive bowel sounds  MSK: Full ROM of all extremities and spine w/o limitation or discomfort  Derm: No rashes, abnormal bruising, or skin lesions  Neuro: AAOx4; CN II-XII intact; motor/sensory function intact  Psych: Cooperative; appropriate mood and affect    Laboratory:  Most Recent Data:  CBC:   Lab Results   Component Value Date    WBC 12.5 (H) 07/11/2022    HGB 9.6 (L) 07/11/2022    HCT 28.0 (L) 07/11/2022     07/11/2022    MCV 94.3 (H) 07/11/2022    RDW 13.0 07/11/2022     WBC Differential:   Recent Labs   Lab 07/10/22  0503 07/11/22  0411   WBC 6.4 12.5*   HGB 10.7* 9.6*   HCT 32.2* 28.0*    237   MCV 95.0* 94.3*     BMP:   Lab Results   Component Value Date     07/11/2022    K 3.2 (L) 07/11/2022    CO2 26 07/11/2022    BUN 16.3 07/11/2022    CREATININE 0.75 07/11/2022    CALCIUM 9.0 07/11/2022    MG 2.10 07/10/2022    PHOS 3.7 07/10/2022     LFTs:   Lab Results   Component Value Date    ALBUMIN 2.9 (L) " 07/11/2022    BILITOT 0.5 07/11/2022    AST 29 07/11/2022    ALKPHOS 70 07/11/2022    ALT 23 07/11/2022     Coags:   Lab Results   Component Value Date    INR 1.04 07/10/2022    PROTIME 13.4 07/10/2022    PTT 53.9 07/11/2022     FLP: No results found for: CHOL, HDL, LDLCALC, TRIG, CHOLHDL  DM:   Lab Results   Component Value Date    HGBA1C 5.1 07/10/2022    CREATININE 0.75 07/11/2022     Thyroid: No results found for: TSH, FREET4, U1NNHUD, K3ZUGHB, THYROIDAB  Anemia: No results found for: IRON, TIBC, FERRITIN, DNSFPCJN52, FOLATE  Cardiac:   Lab Results   Component Value Date    TROPONINI 0.594 (H) 07/11/2022     Urinalysis: No results found for: LABURIN, COLORU, PHUA, CLARITYU, SPECGRAV, LABSPEC, NITRITE, PROTEINUR, GLUCOSEU, KETONESU, UROBILINOGEN, BILIRUBINUR, BLOODU, RBCU, WBCUA    Imaging:    Current meds:    sodium chloride 0.9%  10 mL Intravenous Q6H         ASSESSMENT & PLAN:     Partial SBO  -management by general surgery team  -patient currently NPO  -Continue NG tube on suction per surgery    NSTEMI Type II  -trending troponin x3 q6h, 0.190-->0.594  -EKG shows showed ST depressions in the inferior leads and V2, V3, V4, V5, V6, and AVL with no ST segment elevations  -will trend EKG with troponins  -Consult cardiology this morning per [facility] recommendations  -Ordered echocardiogram TTE  -continue on Heparin gtt    Leukocytosis  -likely reactive, will obtain UA  -no shows of infection, afebrile   -continue to monitor     Hypokalemia   -replete with 60meq KCl  -Ordered daily Mag level  -Repeat BMP after repletion     Normocytic Anemia   -H&H 9.6/28.0, MCV 94.3  -ordered iron profile, ferritin        DVT PPx: Heparin gtt  GI PPx: None  Diet: NPO  ABX: None  Fluids: NS 75cc/h  O2: Room air  PCP: Primary Doctor No    Disposition (07/11/2022): Will speak to cardiology today about recent episode of chest pain. Continue to monitor troponin and EKGs. Replete 60meq KCl. Ordered Echo and continue on heparin gtt.      Radames Loomis, DO   [facility] Internal Medicine, PGY-3

## 2022-07-11 NOTE — PLAN OF CARE
07/11/22 1520   Discharge Assessment   Assessment Type  --    Confirmed/corrected address, phone number and insurance Yes   Confirmed Demographics Correct on Facesheet   Source of Information family;patient   When was your last doctors appointment?   (Dr. Maurice Orozco)   Communicated SANTINO with patient/caregiver Date not available/Unable to determine   Reason For Admission SBO, Elevated Troponin, Hyponatremia, NSTEMI   Lives With sibling(s)  (Brother, Radames Reed)   Facility Arrived From: [facility]   Do you expect to return to your current living situation? Yes   Do you have help at home or someone to help you manage your care at home? Yes   Who are your caregiver(s) and their phone number(s)? Radames Reed ([phone])   Prior to hospitilization cognitive status: Alert/Oriented   Current cognitive status: Alert/Oriented   Walking or Climbing Stairs Difficulty ambulation difficulty, requires equipment   Mobility Management RW   Dressing/Bathing Difficulty bathing difficulty, requires equipment   Dressing/Bathing Management Shower Chair   Equipment Currently Used at Home walker, rolling;shower chair;bedside commode;cane, straight   Patient currently being followed by outpatient case management? No   Do you currently have service(s) that help you manage your care at home? Yes   Name and Contact number of agency [facility]   Is the pt/caregiver preference to resume services with current agency Yes   Do you take prescription medications? Yes   Do you have prescription coverage? Yes   Coverage [employer] supplement   Do you have any problems affording any of your prescribed medications? No   Is the patient taking medications as prescribed? yes   Who is going to help you get home at discharge? Family   How do you get to doctors appointments? family or friend will provide   Are you on dialysis? No   Discharge Plan A Home with family;Home Health   DME Needed Upon  Discharge  none   Discharge Plan discussed with: Patient;Sibling   Name(s) and Number(s) Sister, Kassy Cordero ([phone])   Discharge Barriers Identified None   Relationship/Environment   Name(s) of Who Lives With Patient Radames Reed, Brother

## 2022-07-11 NOTE — PLAN OF CARE
I was called by Surgery on-call team and notified that the patient was undergoing chest pain and there was an EKG just done that showed concern for acute STEMI. I personally went to the patient's room and reviewed the EKG. The newest EKG showed ST depression in all three inferior leads along with V2, V3 V4 and V5 and V6 but not AVL and I. There is no ST elevation noted in any leads These findings are similar to previous  EKG done at 2 PM earlier today, but with deeper depressions. I went to the patient's room and discussed her symptoms. She complained of pain at the center of chest, rated as 4/10 and non-radiating. Noted that its worse with her belching. Does note that it is reproducible with palpation of the chest. Last troponin is 0.187 and her most recent one is 0.190. Patient was given nitro x1 with some relief but could not give second one as repeat BP was low. Per chart review, patient does have a history of CAD with prior CABG. Given her history and atypical chest pain in a female, I am concerned for developing cardiac disease. Will call [facility]-on call for further recommendations and/or transfer.    12:30 PM Addendum:  Spoke with [facility]-on call about the patient. They have recommended no more nitro given that her blood pressure has dropped with initial nitro. Will trend troponin and have Cardiology assess in the AM.    Carlos Fermin MD  [facility] Internal Medicine, PGY-III

## 2022-07-11 NOTE — MEDICAL/APP STUDENT
[facility] General Surgery     AF, HDS over past 24h. Patient complains of epigastric pain with nausea. Patient reported 3 episodes of emesis overnight. Emetis appeared clear and mucous like phlegm vs non bilious vomiting. Inconsistent with dark red brown liquid in cannister from NG tube. Patient states she continues to pass flatus but has not had a bowel movement. PICC line placed yesterday (7/10). No issues. CXR confirmed placement.  Denies CP, SOB.      NAD  RRR  Non-labored breathing, INDIGO  S, ND, NT  Moves all extremities    Out: Past 24 hours 1600 cc NG; 2,000 of urine     WBC 12.5  Hgb 9.6   Cr 0.75  K+ 3.2  Troponin: 0.594 (increased from 1.7-1.9 over the past 24 hours)     Imaging:  CTabd/pelvis: Mechanical SBO with a zone of transition in the central to inferior midline abdomen. Extensive distal colonic diverticulosis and heavy atherosclerosis.   CXR 7/10/22: PICC line and NG tube in proper placement    89 yo F with SBO confirmed on imaging and now NSTEMI    - Medicine notified of recent troponin of 0.594  - IP consult to cardiology   - Continue NPO, NG tube to suction     Nataliya Rice   [facility], MS4  07/11/2022 7:33 AM

## 2022-07-11 NOTE — CONSULTS
"    SUBJECTIVE:      Chief Complaint: Transfer (Reports via [facility] from [facility] due to small bowel obstruction.)       HPI: Zackery Purdy is a 89 y.o. yo female with hx of CHF, CAD s/p CABG (6 years ago) At the [facility] who presented to [facility] ED with small bowel obstruction on 7/10/22. Patient noted to have elevated troponins that were down trending at outside hospital  and EKG with nonspecific ST changes. At the time, she denied chest pain, SOB, or BUTLER.     Overnight, pt had episode of chest pain and EKG showing ST depressions in inferior leads and V2, V3, V4, V5, V6 and AVL, along with elevated troponin (0.594) concerning for NSTEMI. She describes the overnight  epigastric/substernal chest pain as "pain from gagging". Currently, she denies chest pain or SOB. She only reports emesis and discomfort from NG tube. Cardiology was consulted for further recommendations.         Home Medications:  Current Outpatient Medications   Medication Instructions    fexofenadine-pseudoephedrine (ALLEGRA-D 24) 180-240 mg per 24 hr tablet 1 tablet, Oral, Daily    furosemide (LASIX) 40 mg, Oral, Daily    losartan (COZAAR) 25 mg, Oral, Daily    minoxidiL (LONITEN) 2.5 mg, Oral, Daily    simvastatin (ZOCOR) 40 mg, Oral, Nightly        ROS:  Constitutional: febrile, no chills, weakness or fatigue.   Eye: no vision loss, eye redness, drainage, or pain  ENMT: no sore throat, ear pain, sinus pain/congestion, nasal congestion/drainage  Respiratory: no cough, no wheezing, no shortness of breath  Cardiovascular: no chest pain, no palpitations, no edema  Gastrointestinal: no nausea, vomiting, or diarrhea. No abdominal pain  Genitourinary: no dysuria, no urinary frequency or urgency, no hematuria  Hema/Lymph: no abnormal bruising or bleeding  Endocrine: no heat or cold intolerance, no excessive thirst or excessive urination  Musculoskeletal: no muscle or joint pain, no joint swelling  Integumentary: no skin rash or abnormal " "lesion  Neurologic: no headache, no dizziness, no weakness or numbness      OBJECTIVE:     Vital signs:   BP (!) 169/68   Pulse 105   Temp (!) 100.4 °F (38 °C)   Resp 20   Ht 5' 5" (1.651 m)   Wt 78.9 kg (174 lb)   SpO2 98%   BMI 28.96 kg/m²      Physical Examination:  General: Patient resting comfortably in bed, in no acute distress   Eye: PERRLA, EOMI, clear conjunctiva, eyelids normal  HENT: Head-normocephalic and atraumatic  Neck: full range of motion, no thyromegaly or lymphadenopathy, trachea midline, supple, no palpable thyroid nodules  Respiratory: clear to auscultation bilaterally without wheezes, rales, rhonchi  Cardiovascular: regular rate and rhythm without murmurs.  No gallops or rubs no JVD.  Capillary refill within normal limits.  Gastrointestinal: soft, non-tender, non-distended with normal bowel sounds, without masses to palpation  Genitourinary: no CVA tenderness to palpation  Musculoskeletal: full range of motion of all extremities/spine without limitation or discomfort  Integumentary: no rashes or skin lesions present  Neurologic: no signs of peripheral neurological deficit, motor/sensory function intact  Psychiatric:  alert and oriented, cognitive function intact, cooperative with exam, good eye contact, judgement and insight intact, mood and affect full range.    Labs:    Troponins 7/11.22: 0.594 > 0.504      ASSESSMENT & PLAN:     89 yr female with hx CHF, CAD s/p CABG, HTN who is admitted with small bowel obstruction. Patient noted to have elevated troponin (0.594) and EKG showing ST changes. Cardiology consulted for recommendations.       NSTEMI Type II   - Echo 7/11 normal   - Low suspicion for ACS given absence of clinical symptoms and down trending troponins (0.594, 0.504)   - Trop elevation likely in setting of demand ischemia due to hypovolemia from dehydration    - EKG ST depressions likely related to LVH but cannot rule out ischemic etiology given past medical hx   - Do not " recommend cardiac catheterization during this admission   - Recommend follow-up with Cardiologist outpatient  (Dr. Layne in [place]) to discuss further cardiac management             Anette Wynn MD  [facility] Internal Medicine, -1    Cardiology attending addendum  Patient was seen and examined with Dr. Anette Wynn. Agree with plan as outlined above. Pt's mildly elevated troponin  more likely in the setting of demand ischemia due to hypovolemia and SBO. Echo which I have personally reviewed shows hyperdynamic LV function. EKG changes likely due to LVH. Do not recommend a coronary angiogram at this time. Please keep pt on aspirin 81 mg daily. Do not think heparin gtt is beneficial. Close follow up with her outpatient cardiologist on discharge.    Cleo Costello MD  Cardiology-[facility]

## 2022-07-11 NOTE — PROGRESS NOTES
[facility]  Adult Nutrition  Progress Note    SUMMARY       Recommendations    Recommendation/Intervention: 1. If pt to remains NPO; consider use TPN--has PICC line--Clininix 5/20 @ 75ml/hr with lipids 20% 250 ml every other day to provide 1834 calories, 90 gm protein. 2. When appropriate--ADAT to cardiac diet with boost tid. 3. Pt education on diet provided. 4. MVI/fe 5. biweekly wt. Will monitor nutrition status  Goals: meet > 75% nutrition needs.  Nutrition Goal Status: new    Assessment and Plan  Pt NPO; NGT suction; reported + N/V/decreased appetite x 2 days; usually eats well; + SBO. No wt loss. IVF @ 75ml/hr;. TPN rec provided in event pt needs to remain NPO. Labs acknowledged. Nutrition education provided on cardiac diet.   Nutrition Problem  Inadequate Oral Intake    Related to (etiology):   SBO     Signs and Symptoms (as evidenced by):   NPO status     Interventions(treatment strategy):  General / Healthful Diet, Modified rate of parenteral nutrition, Commercial beverage, Purpose of nutrition education and Collaboration with other providers    Nutrition Diagnosis Status:   New      Malnutrition Assessment  Malnutrition Type: other (see comments) (pt does not meet malnutiriton criteria for any categoy. (not-wt loss; po intake; fat/muscle loss, edema)          Reason for Assessment    Reason For Assessment: other (see comments) (nutriiton dx)  Diagnosis: other (see comments), gastrointestinal disease (SBO, NSTEMI, leukocytosis,hypokalemia, anemia)  Relevant Medical History: CHF, CAD, CABG, HTN    Nutrition Risk Screen    Nutrition Risk Screen: no indicators present    Nutrition/Diet History    Patient Reported Diet/Restrictions/Preferences: general  Spiritual, Cultural Beliefs, Catholic Practices, Values that Affect Care: no  Food Allergies: NKFA  Factors Affecting Nutritional Intake: NPO, decreased appetite, nausea/vomiting    Anthropometrics    Temp: (!) 100.4 °F (38  "°C)  Height: 5' 5" (165.1 cm)  Height (inches): 65 in  Weight Method: Bed Scale  Weight: 79 kg (174 lb 2.6 oz)  Weight (lb): 174.17 lb  Ideal Body Weight (IBW), Female: 125 lb  % Ideal Body Weight, Female (lb): 139.2 %  BMI (Calculated): 29  BMI Grade: 25 - 29.9 - overweight  Usual Body Weight (UBW), k kg  % Usual Body Weight: 100.21  % Weight Change From Usual Weight: 0 %       Lab/Procedures/Meds    Pertinent Labs Reviewed: reviewed  Pertinent Labs Comments: () H/h 9.6/28.0(L) Gluc 118 Bun 16 Cr 0.75 K 3.2(L) Alb 2.9(L)  Pertinent Medications Reviewed: reviewed  Pertinent Medications Comments: pantoprazole, KCl, IVF 0.9 Na Cl @ 75ml/hr        Estimated/Assessed Needs    Weight Used For Calorie Calculations: 79.1 kg (174 lb 6.1 oz)  Energy Calorie Requirements (kcal): 1977kcal/d; 25 dale/kg  Energy Need Method: Kcal/kg  Protein Requirements: 95 gm protein/d; 1.2 gm/kg  Weight Used For Protein Calculations: 79.1 kg (174 lb 6.1 oz)     Estimated Fluid Requirement Method: RDA Method  RDA Method (mL):          Nutrition Prescription Ordered    Current Diet Order: NPO 7-10; NGT suction    Evaluation of Received Nutrient/Fluid Intake    Energy Calories Required: not meeting needs  Protein Required: not meeting needs  Fluid Required: meeting needs  Total Fluid Intake (mL/kg): IVF @ 75m/hr  Tolerance: not tolerating  % Intake of Estimated Energy Needs: Other: NPO   % Meal Intake: NPO    Nutrition Risk    Level of Risk/Frequency of Follow-up: moderate (4,0,3,0(decreased 2 days),0=7)     Monitor and Evaluation    Food and Nutrient Intake: food and beverage intake, parenteral nutrition intake  Food and Nutrient Adminstration: enteral and parenteral nutrition administration  Knowledge/Beliefs/Attitudes: food and nutrition knowledge/skill  Anthropometric Measurements: weight  Biochemical Data, Medical Tests and Procedures: electrolyte and renal panel     Nutrition Follow-Up    RD Follow-up?: Yes    "

## 2022-07-11 NOTE — PROCEDURES
"Zackery Purdy is a 89 y.o. female patient.    Temp: 98.3 °F (36.8 °C) (07/10/22 1941)  Pulse: (!) 119 (07/10/22 2048)  Resp: 20 (07/10/22 1941)  BP: (!) 171/69 (07/10/22 2048)  SpO2: 98 % (07/10/22 1941)  Weight: 79.1 kg (174 lb 6.1 oz) (07/10/22 1100)  Height: 5' 5" (165.1 cm) (07/10/22 1149)    PICC  Date/Time: 7/10/2022 10:30 PM  Performed by: Jefferson Elena RN  Consent Done: Yes  Time out: Immediately prior to procedure a time out was called to verify the correct patient, procedure, equipment, support staff and site/side marked as required  Indications: med administration and vascular access  Local anesthetic: lidocaine 1% without epinephrine  Anesthetic Total (mL): 5  Preparation: skin prepped with ChloraPrep  Skin prep agent dried: skin prep agent completely dried prior to procedure  Sterile barriers: all five maximum sterile barriers used - cap, mask, sterile gown, sterile gloves, and large sterile sheet  Hand hygiene: hand hygiene performed prior to central venous catheter insertion  Location details: right basilic  Catheter type: double lumen  Catheter size: 5 Fr  Catheter Length: 36cm    Ultrasound guidance: yes  Vessel Caliber: large and patent, compressibility normal  Vascular Doppler: not done  Needle advanced into vessel with real time Ultrasound guidance.  Guidewire confirmed in vessel.  Sterile sheath used.  no esophageal manometryNumber of attempts: 1  Post-procedure: blood return through all ports, chlorhexidine patch and sterile dressing applied  Technical procedures used: modified genie Elena RN  7/10/2022  "

## 2022-07-12 LAB
ALBUMIN SERPL-MCNC: 2.9 GM/DL (ref 3.4–4.8)
ALBUMIN/GLOB SERPL: 0.9 RATIO (ref 1.1–2)
ALP SERPL-CCNC: 73 UNIT/L (ref 40–150)
ALT SERPL-CCNC: 21 UNIT/L (ref 0–55)
AST SERPL-CCNC: 29 UNIT/L (ref 5–34)
BASOPHILS # BLD AUTO: 0.03 X10(3)/MCL (ref 0–0.2)
BASOPHILS NFR BLD AUTO: 0.2 %
BILIRUBIN DIRECT+TOT PNL SERPL-MCNC: 0.7 MG/DL
BUN SERPL-MCNC: 17.7 MG/DL (ref 9.8–20.1)
CALCIUM SERPL-MCNC: 9.5 MG/DL (ref 8.4–10.2)
CHLORIDE SERPL-SCNC: 102 MMOL/L (ref 98–111)
CO2 SERPL-SCNC: 28 MMOL/L (ref 23–31)
CREAT SERPL-MCNC: 0.83 MG/DL (ref 0.55–1.02)
EOSINOPHIL # BLD AUTO: 0.02 X10(3)/MCL (ref 0–0.9)
EOSINOPHIL NFR BLD AUTO: 0.1 %
ERYTHROCYTE [DISTWIDTH] IN BLOOD BY AUTOMATED COUNT: 13.5 % (ref 11.5–17)
FOLATE SERPL-MCNC: 16.5 NG/ML (ref 7–31.4)
GLOBULIN SER-MCNC: 3.1 GM/DL (ref 2.4–3.5)
GLUCOSE SERPL-MCNC: 97 MG/DL (ref 75–121)
HCT VFR BLD AUTO: 29.1 % (ref 37–47)
HGB BLD-MCNC: 9.4 GM/DL (ref 12–16)
IMM GRANULOCYTES # BLD AUTO: 0.15 X10(3)/MCL (ref 0–0.04)
IMM GRANULOCYTES NFR BLD AUTO: 0.9 %
LYMPHOCYTES # BLD AUTO: 0.76 X10(3)/MCL (ref 0.6–4.6)
LYMPHOCYTES NFR BLD AUTO: 4.8 %
MAGNESIUM SERPL-MCNC: 2.2 MG/DL (ref 1.6–2.6)
MCH RBC QN AUTO: 32.2 PG (ref 27–31)
MCHC RBC AUTO-ENTMCNC: 32.3 MG/DL (ref 33–36)
MCV RBC AUTO: 99.7 FL (ref 80–94)
MONOCYTES # BLD AUTO: 1.61 X10(3)/MCL (ref 0.1–1.3)
MONOCYTES NFR BLD AUTO: 10.1 %
NEUTROPHILS # BLD AUTO: 13.4 X10(3)/MCL (ref 2.1–9.2)
NEUTROPHILS NFR BLD AUTO: 83.9 %
NRBC BLD AUTO-RTO: 0 %
PLATELET # BLD AUTO: 235 X10(3)/MCL (ref 130–400)
PMV BLD AUTO: 9.6 FL (ref 7.4–10.4)
POTASSIUM SERPL-SCNC: 3.8 MMOL/L (ref 3.5–5.1)
PROT SERPL-MCNC: 6 GM/DL (ref 5.8–7.6)
RBC # BLD AUTO: 2.92 X10(6)/MCL (ref 4.2–5.4)
SODIUM SERPL-SCNC: 139 MMOL/L (ref 132–146)
TROPONIN I SERPL-MCNC: 0.66 NG/ML (ref 0–0.04)
VIT B12 SERPL-MCNC: 1629 PG/ML (ref 213–816)
WBC # SPEC AUTO: 16 X10(3)/MCL (ref 4.5–11.5)

## 2022-07-12 PROCEDURE — 94761 N-INVAS EAR/PLS OXIMETRY MLT: CPT

## 2022-07-12 PROCEDURE — 80053 COMPREHEN METABOLIC PANEL: CPT

## 2022-07-12 PROCEDURE — 25000003 PHARM REV CODE 250: Performed by: STUDENT IN AN ORGANIZED HEALTH CARE EDUCATION/TRAINING PROGRAM

## 2022-07-12 PROCEDURE — 63600175 PHARM REV CODE 636 W HCPCS: Performed by: STUDENT IN AN ORGANIZED HEALTH CARE EDUCATION/TRAINING PROGRAM

## 2022-07-12 PROCEDURE — 63600175 PHARM REV CODE 636 W HCPCS

## 2022-07-12 PROCEDURE — 87040 BLOOD CULTURE FOR BACTERIA: CPT | Performed by: STUDENT IN AN ORGANIZED HEALTH CARE EDUCATION/TRAINING PROGRAM

## 2022-07-12 PROCEDURE — 84484 ASSAY OF TROPONIN QUANT: CPT | Performed by: STUDENT IN AN ORGANIZED HEALTH CARE EDUCATION/TRAINING PROGRAM

## 2022-07-12 PROCEDURE — 25000003 PHARM REV CODE 250

## 2022-07-12 PROCEDURE — 85025 COMPLETE CBC W/AUTO DIFF WBC: CPT

## 2022-07-12 PROCEDURE — 82746 ASSAY OF FOLIC ACID SERUM: CPT | Performed by: STUDENT IN AN ORGANIZED HEALTH CARE EDUCATION/TRAINING PROGRAM

## 2022-07-12 PROCEDURE — C1751 CATH, INF, PER/CENT/MIDLINE: HCPCS

## 2022-07-12 PROCEDURE — 82607 VITAMIN B-12: CPT | Performed by: STUDENT IN AN ORGANIZED HEALTH CARE EDUCATION/TRAINING PROGRAM

## 2022-07-12 PROCEDURE — 97530 THERAPEUTIC ACTIVITIES: CPT

## 2022-07-12 PROCEDURE — 25000003 PHARM REV CODE 250: Performed by: SURGERY

## 2022-07-12 PROCEDURE — A4216 STERILE WATER/SALINE, 10 ML: HCPCS

## 2022-07-12 PROCEDURE — C9113 INJ PANTOPRAZOLE SODIUM, VIA: HCPCS

## 2022-07-12 PROCEDURE — 63600175 PHARM REV CODE 636 W HCPCS: Performed by: SURGERY

## 2022-07-12 PROCEDURE — 83735 ASSAY OF MAGNESIUM: CPT

## 2022-07-12 PROCEDURE — 21400001 HC TELEMETRY ROOM

## 2022-07-12 PROCEDURE — 36415 COLL VENOUS BLD VENIPUNCTURE: CPT | Performed by: STUDENT IN AN ORGANIZED HEALTH CARE EDUCATION/TRAINING PROGRAM

## 2022-07-12 RX ORDER — DEXTROSE MONOHYDRATE, SODIUM CHLORIDE, AND POTASSIUM CHLORIDE 50; 1.49; 4.5 G/1000ML; G/1000ML; G/1000ML
INJECTION, SOLUTION INTRAVENOUS CONTINUOUS
Status: DISCONTINUED | OUTPATIENT
Start: 2022-07-12 | End: 2022-07-14

## 2022-07-12 RX ORDER — POTASSIUM CHLORIDE 7.45 MG/ML
10 INJECTION INTRAVENOUS
Status: DISPENSED | OUTPATIENT
Start: 2022-07-12 | End: 2022-07-12

## 2022-07-12 RX ORDER — ENOXAPARIN SODIUM 100 MG/ML
40 INJECTION SUBCUTANEOUS EVERY 24 HOURS
Status: DISCONTINUED | OUTPATIENT
Start: 2022-07-12 | End: 2022-07-15 | Stop reason: HOSPADM

## 2022-07-12 RX ADMIN — HYDRALAZINE HYDROCHLORIDE 10 MG: 20 INJECTION INTRAMUSCULAR; INTRAVENOUS at 05:07

## 2022-07-12 RX ADMIN — MUPIROCIN: 20 OINTMENT TOPICAL at 08:07

## 2022-07-12 RX ADMIN — PANTOPRAZOLE SODIUM 40 MG: 40 INJECTION, POWDER, FOR SOLUTION INTRAVENOUS at 08:07

## 2022-07-12 RX ADMIN — PIPERACILLIN SODIUM, TAZOBACTAM SODIUM 4.5 G: 4; .5 INJECTION, POWDER, LYOPHILIZED, FOR SOLUTION INTRAVENOUS at 06:07

## 2022-07-12 RX ADMIN — POTASSIUM CHLORIDE, DEXTROSE MONOHYDRATE AND SODIUM CHLORIDE: 150; 5; 450 INJECTION, SOLUTION INTRAVENOUS at 03:07

## 2022-07-12 RX ADMIN — SODIUM CHLORIDE, PRESERVATIVE FREE 10 ML: 5 INJECTION INTRAVENOUS at 05:07

## 2022-07-12 RX ADMIN — SODIUM CHLORIDE, PRESERVATIVE FREE 10 ML: 5 INJECTION INTRAVENOUS at 06:07

## 2022-07-12 RX ADMIN — POTASSIUM CHLORIDE 10 MEQ: 10 INJECTION, SOLUTION INTRAVENOUS at 10:07

## 2022-07-12 RX ADMIN — SODIUM CHLORIDE: 9 INJECTION, SOLUTION INTRAVENOUS at 04:07

## 2022-07-12 RX ADMIN — ENOXAPARIN SODIUM 40 MG: 40 INJECTION SUBCUTANEOUS at 06:07

## 2022-07-12 RX ADMIN — SODIUM CHLORIDE, PRESERVATIVE FREE 10 ML: 5 INJECTION INTRAVENOUS at 11:07

## 2022-07-12 RX ADMIN — ASPIRIN 81 MG: 81 TABLET, COATED ORAL at 08:07

## 2022-07-12 RX ADMIN — SODIUM CHLORIDE, PRESERVATIVE FREE 10 ML: 5 INJECTION INTRAVENOUS at 12:07

## 2022-07-12 RX ADMIN — TAZOBACTAM SODIUM AND PIPERACILLIN SODIUM 4.5 G: 500; 4 INJECTION, SOLUTION INTRAVENOUS at 04:07

## 2022-07-12 RX ADMIN — POTASSIUM CHLORIDE 10 MEQ: 10 INJECTION, SOLUTION INTRAVENOUS at 08:07

## 2022-07-12 RX ADMIN — POTASSIUM CHLORIDE 10 MEQ: 10 INJECTION, SOLUTION INTRAVENOUS at 11:07

## 2022-07-12 NOTE — PROGRESS NOTES
[facility] General [facility] - [facility] Team  Daily Progress Note    Patient ID: Zackery Purdy     [facility] Day: 3    Subjective:  Blood pressure increased to 176/78 since last night, pt was given hydralazine to control it. Pt denies nausea, but patient reports one episode of emesis with foamy mucus overnight. Patient reports no appetite, CP, SOB. She has had no BMs, but continues to have flatulence.    Objective:  Problem List:  Patient Active Problem List   Diagnosis    SBO (small bowel obstruction)    NSTEMI (non-ST elevated myocardial infarction)    Hyponatremia    Elevated troponin    Tachyarrhythmia    Hypokalemia    Normocytic anemia    Coronary artery disease involving coronary bypass graft of native heart       Vitals:  Temp:  [97.8 °F (36.6 °C)-100.4 °F (38 °C)] 98.2 °F (36.8 °C)  Pulse:  [] 129  Resp:  [20] 20  SpO2:  [94 %-99 %] 95 %  BP: (146-176)/(53-78) 155/53    Intake/Output:   In: -   Out: 150 [Drains:150]  I/O last 3 completed shifts:  In: 2150 [I.V.:1350; IV Piggyback:800]  Out: 4600 [Urine:2600; Drains:2000]    Labs:  Recent Labs   Lab 07/10/22  0503 07/11/22  0411 07/11/22  1512 07/12/22  0319   HGB 10.7* 9.6*  --  9.4*   WBC 6.4 12.5*  --  16.0*    237  --  235   BUN 17.0 16.3 17.4 17.7   CREATININE 0.97 0.75 0.83 0.83   MG 2.10 1.70  --  2.20   K 4.1 3.2* 4.1 3.8   }    Physical Exam:  Gen: NAD  Neuro: awake, alert, answering questions appropriately  Resp: non-labored breathing, INDIGO  Abd: soft, ND, NT  Ext: moves all 4 spontaneously and purposefully  Skin: warm, well perfused    Imaging:  None.      Assessment/Plan:  89 y.o.female with NSTEMI, SBO.    - Continue NG tube, NPO.  - WBC of 16 today., up from 12.5  -taken off heparin, now on ASA  - Troponins up to .740 from .504.   Cardiology has no plans for cath at this time.    Kenroy Perez MD   [facility], PGY1  07/12/2022 7:53 AM       PGY3 Addendum  Patient appears more uncomfortable and less alert this morning. Spiked  temp 100.4F with increasing WBC. UA yesterday, culture pending. Started on zosyn. Blood culture and CXR this morning. Given concern for UTI and worsening clinical picture will continue NGT decompression for now with plan for SBFT once patient more stable.   Dilcia Sebastian

## 2022-07-12 NOTE — PT/OT/SLP PROGRESS
Physical Therapy Treatment    Patient Name:  Zackery Purdy   MRN:  [id number]    Recommendations:     Discharge Recommendations:  other (see comments) (SNF Placement vs. home w/ responsible caregiver w/ HH w/ HHPT)   Discharge Equipment Recommendations: walker, rolling, shower chair, bedside commode   Barriers to discharge: None    Assessment:     Zackery Purdy is a 89 y.o. female admitted with a medical diagnosis of       Partial SBO  NSTEMI Type II   Leukocytosis  Hypokalemia   Normocytic Anemia         Patient Active Problem List   Diagnosis    SBO (small bowel obstruction)    NSTEMI (non-ST elevated myocardial infarction)    Hyponatremia    Elevated troponin    Tachyarrhythmia    Hypokalemia    Normocytic anemia    Coronary artery disease involving coronary bypass graft of native heart     She presents with the following impairments/functional limitations:  weakness, impaired endurance, impaired self care skills, impaired functional mobilty, gait instability, impaired balance.    Rehab Prognosis: Good; patient would benefit from acute skilled PT services to address these deficits and reach maximum level of function.    -continued OOB, side steps along edge of bed, initiate ambulation away from bed/out of room w/ rolling walker w/ supervision/assistance w/ progression as tolerated/appropriate    Recent Surgery: * No surgery found *      Plan:     During this hospitalization, patient to be seen  (3-5 TIMES/WEEK) to address the identified rehab impairments via gait training, therapeutic activities, therapeutic exercises and progress toward the following goals:    · Plan of Care Expires:  08/08/22    Subjective     Chief Complaint: tired, weak, not a good day  Patient/Family Comments/goals: return home to [facility]  Pain/Comfort:  · Pain Rating 1: 0/10      Objective:     Communicated with pt's nurse Velez prior to session.  Patient found supine in bed w/ HOB elevated w/ HOB rails up with NG tube, telemetry,  peripheral IV upon PT entry to room.     General Precautions: Standard, fall, vision impaired, other (see comments) (standard)   Orthopedic Precautions:Full weight bearing   Braces: N/A  Respiratory Status: Room air     Functional Mobility:  · Bed Mobility:     · Rolling Right: supervision  · Scooting: contact guard assistance  · Supine to Sit: contact guard assistance  -sat on edge of bed  -donned 2nd back gown  -bilat  socks adjusted  · Transfers:     · Sit to Stand:  contact guard assistance with rolling walker   · -pt's nurse Rosie into room to check IV pump and notified pt utilized adult diaper (urinated)  · -pt's nurse obtained BSC  · -pt transitioned to BSC.......  · Gait: contact guard assistance w/ rolling walker  · -side steps to HOB (pt's Rt) x3ft...turned and sat on BSC  · -pt instructed to call nurse for transitioning back to bed and verbalized understanding  · -slowed/guarded mvmts  · -decreased bilat step length  · -assist w/ rolling walker for advancement        Balance Training  Static Sitting:  Patient performed static sitting on level surface  using no device with [place] and no verbal cues.      Dynamic Sitting:  Patient performed dynamic sitting on level surface using no device with [place] and no verbal cues during moderate excursions.     Balance Training  Static Standing:  Patient performed static standing on level surface  using rolling walker with Modified [place] and no verbal cues.    Patient left sitting on BSC with all lines intact, call button in reach, pt's nurse Rosie notified and tray table at bedside w/ NGT to wall suction..    GOALS:   Multidisciplinary Problems     Physical Therapy Goals        Problem: Physical Therapy    Goal Priority Disciplines Outcome Goal Variances Interventions   Physical Therapy Goal     PT, PT/OT      Description: Goals to be met by: DISCHARGE     Patient will increase functional independence with mobility by performing:    -. Supine  to sit with Stand-by Assistance - ONGOING  -. Sit to supine with Stand-by Assistance - ONGOING  -. Rolling to Left and Right with Modified [place] - ONGOING  -. Sit to stand transfer with Supervision - ONGOING  -. Gait  x 65ft x2 with Supervision using Rolling Walker - ONGOING  -. Ascend/descend 3 stair with bilateral Handrails Supervision using No Assistive Device - ONGOING                     Time Tracking:     PT Received On: 07/12/22  PT Start Time: 1104     PT Stop Time: 1119  PT Total Time (min): 15 min     Billable Minutes: Therapeutic Activity 15    Treatment Type: Treatment  PT/PTA: PT           07/12/2022

## 2022-07-12 NOTE — MEDICAL/APP STUDENT
[facility] Progress Note    Subjective   The patient is a 89F with a Hx of CHF, HTN, and CAD transferred from OSH for suspected SBO. Blood pressure increased to 176/78 since last night, pt was given hydralazine to control it. No nausea, but patient reports one episode of emesis with foamy mucus overnight. Patient reports no appetite, CP, SOB. Urinated last night and nurse reports 150 mL of fluid from the NG tube overnight with red brown liquid in tube. No BM since yesterday, put reports flatus. Patient reports to be afebrile. Most recent troponin is .740 as of 3:12PM 7/11.     Objective  Temp:  [97.8 °F (36.6 °C)-100.4 °F (38 °C)] 99.5 °F (37.5 °C)  Pulse:  [] 105  Resp:  [20] 20  SpO2:  [94 %-99 %] 95 %  BP: (122-176)/(62-78) 176/76    I/O this shift:  In: -   Out: 150 [Drains:150]  I/O last 3 completed shifts:  In: 2150 [I.V.:1350; IV Piggyback:800]  Out: 4600 [Urine:2600; Drains:2000]    Gen: NAD, AAOx3      Labs:      Recent Labs   Lab 07/10/22  0503 07/10/22  1559 07/11/22  0411 07/11/22  0641 07/11/22  1512 07/12/22  0319   WBC 6.4  --  12.5*  --   --  16.0*   HGB 10.7*  --  9.6*  --   --  9.4*   HCT 32.2*  --  28.0*  --   --  29.1*     --  237  --   --  235   INR  --  1.04  --   --   --   --    *  --  138  --  136 139   CHLORIDE 95*  --  99  --  99 102   CO2 27  --  26  --  28 28   BUN 17.0  --  16.3  --  17.4 17.7   CREATININE 0.97  --  0.75  --  0.83 0.83   GLUCOSE 110  --  118  --  114 97   CALCIUM 10.4*  --  9.0  --  9.2 9.5   MG 2.10  --  1.70  --   --  2.20   PHOS 3.7  --   --  3.7  --   --    ALKPHOS 87  --  70  --   --  73       A/P:    89 y.o. female with a Hx of CHF, HTN, and CAD transferred from OSH for suspected SBO.     Continue NPO status, NG tube to suction      @SIG@

## 2022-07-12 NOTE — PT/OT/SLP PROGRESS
Missed Treatment Session - cancel note      Physical Therapy      Patient Name:  Zackery Purdy   MRN:  [id number]    Patient not seen today secondary to Testing/imaging (xray/CT/MRI), Other (Comment) (pt out of room for SB Follow-Through). Will follow-up 07/13/2022.

## 2022-07-13 LAB
ALBUMIN SERPL-MCNC: 2.8 GM/DL (ref 3.4–4.8)
ALBUMIN/GLOB SERPL: 0.8 RATIO (ref 1.1–2)
ALP SERPL-CCNC: 67 UNIT/L (ref 40–150)
ALT SERPL-CCNC: 21 UNIT/L (ref 0–55)
AST SERPL-CCNC: 29 UNIT/L (ref 5–34)
BASOPHILS # BLD AUTO: 0.04 X10(3)/MCL (ref 0–0.2)
BASOPHILS NFR BLD AUTO: 0.3 %
BILIRUBIN DIRECT+TOT PNL SERPL-MCNC: 0.7 MG/DL
BUN SERPL-MCNC: 19.7 MG/DL (ref 9.8–20.1)
CALCIUM SERPL-MCNC: 9.9 MG/DL (ref 8.4–10.2)
CHLORIDE SERPL-SCNC: 106 MMOL/L (ref 98–111)
CO2 SERPL-SCNC: 29 MMOL/L (ref 23–31)
CREAT SERPL-MCNC: 0.93 MG/DL (ref 0.55–1.02)
EOSINOPHIL # BLD AUTO: 0.05 X10(3)/MCL (ref 0–0.9)
EOSINOPHIL NFR BLD AUTO: 0.4 %
ERYTHROCYTE [DISTWIDTH] IN BLOOD BY AUTOMATED COUNT: 13.5 % (ref 11.5–17)
GLOBULIN SER-MCNC: 3.3 GM/DL (ref 2.4–3.5)
GLUCOSE SERPL-MCNC: 131 MG/DL (ref 75–121)
HCT VFR BLD AUTO: 27.6 % (ref 37–47)
HGB BLD-MCNC: 8.8 GM/DL (ref 12–16)
IMM GRANULOCYTES # BLD AUTO: 0.08 X10(3)/MCL (ref 0–0.04)
IMM GRANULOCYTES NFR BLD AUTO: 0.6 %
LYMPHOCYTES # BLD AUTO: 1 X10(3)/MCL (ref 0.6–4.6)
LYMPHOCYTES NFR BLD AUTO: 7.2 %
MAGNESIUM SERPL-MCNC: 2.2 MG/DL (ref 1.6–2.6)
MCH RBC QN AUTO: 31.3 PG (ref 27–31)
MCHC RBC AUTO-ENTMCNC: 31.9 MG/DL (ref 33–36)
MCV RBC AUTO: 98.2 FL (ref 80–94)
MONOCYTES # BLD AUTO: 1.61 X10(3)/MCL (ref 0.1–1.3)
MONOCYTES NFR BLD AUTO: 11.6 %
NEUTROPHILS # BLD AUTO: 11.1 X10(3)/MCL (ref 2.1–9.2)
NEUTROPHILS NFR BLD AUTO: 79.9 %
NRBC BLD AUTO-RTO: 0 %
PLATELET # BLD AUTO: 233 X10(3)/MCL (ref 130–400)
PMV BLD AUTO: 9.7 FL (ref 7.4–10.4)
POTASSIUM SERPL-SCNC: 4 MMOL/L (ref 3.5–5.1)
PROT SERPL-MCNC: 6.1 GM/DL (ref 5.8–7.6)
RBC # BLD AUTO: 2.81 X10(6)/MCL (ref 4.2–5.4)
SODIUM SERPL-SCNC: 142 MMOL/L (ref 132–146)
WBC # SPEC AUTO: 13.9 X10(3)/MCL (ref 4.5–11.5)

## 2022-07-13 PROCEDURE — 21400001 HC TELEMETRY ROOM

## 2022-07-13 PROCEDURE — C9113 INJ PANTOPRAZOLE SODIUM, VIA: HCPCS

## 2022-07-13 PROCEDURE — 63600175 PHARM REV CODE 636 W HCPCS: Performed by: STUDENT IN AN ORGANIZED HEALTH CARE EDUCATION/TRAINING PROGRAM

## 2022-07-13 PROCEDURE — C1751 CATH, INF, PER/CENT/MIDLINE: HCPCS

## 2022-07-13 PROCEDURE — 94761 N-INVAS EAR/PLS OXIMETRY MLT: CPT

## 2022-07-13 PROCEDURE — 97116 GAIT TRAINING THERAPY: CPT

## 2022-07-13 PROCEDURE — 25000003 PHARM REV CODE 250: Performed by: SURGERY

## 2022-07-13 PROCEDURE — 97535 SELF CARE MNGMENT TRAINING: CPT

## 2022-07-13 PROCEDURE — 25000003 PHARM REV CODE 250

## 2022-07-13 PROCEDURE — 97110 THERAPEUTIC EXERCISES: CPT

## 2022-07-13 PROCEDURE — 25000003 PHARM REV CODE 250: Performed by: STUDENT IN AN ORGANIZED HEALTH CARE EDUCATION/TRAINING PROGRAM

## 2022-07-13 PROCEDURE — 63600175 PHARM REV CODE 636 W HCPCS

## 2022-07-13 PROCEDURE — A4216 STERILE WATER/SALINE, 10 ML: HCPCS

## 2022-07-13 PROCEDURE — 36415 COLL VENOUS BLD VENIPUNCTURE: CPT

## 2022-07-13 PROCEDURE — 85025 COMPLETE CBC W/AUTO DIFF WBC: CPT

## 2022-07-13 PROCEDURE — 83735 ASSAY OF MAGNESIUM: CPT

## 2022-07-13 PROCEDURE — 97530 THERAPEUTIC ACTIVITIES: CPT

## 2022-07-13 PROCEDURE — 80053 COMPREHEN METABOLIC PANEL: CPT

## 2022-07-13 PROCEDURE — 63600175 PHARM REV CODE 636 W HCPCS: Performed by: SURGERY

## 2022-07-13 RX ORDER — TALC
6 POWDER (GRAM) TOPICAL NIGHTLY PRN
Status: DISCONTINUED | OUTPATIENT
Start: 2022-07-13 | End: 2022-07-13

## 2022-07-13 RX ORDER — TALC
6 POWDER (GRAM) TOPICAL NIGHTLY PRN
Status: DISCONTINUED | OUTPATIENT
Start: 2022-07-13 | End: 2022-07-15 | Stop reason: HOSPADM

## 2022-07-13 RX ADMIN — PANTOPRAZOLE SODIUM 40 MG: 40 INJECTION, POWDER, FOR SOLUTION INTRAVENOUS at 08:07

## 2022-07-13 RX ADMIN — PIPERACILLIN SODIUM, TAZOBACTAM SODIUM 4.5 G: 4; .5 INJECTION, POWDER, LYOPHILIZED, FOR SOLUTION INTRAVENOUS at 09:07

## 2022-07-13 RX ADMIN — POTASSIUM CHLORIDE, DEXTROSE MONOHYDRATE AND SODIUM CHLORIDE: 150; 5; 450 INJECTION, SOLUTION INTRAVENOUS at 01:07

## 2022-07-13 RX ADMIN — SODIUM CHLORIDE, PRESERVATIVE FREE 10 ML: 5 INJECTION INTRAVENOUS at 05:07

## 2022-07-13 RX ADMIN — ASPIRIN 81 MG: 81 TABLET, COATED ORAL at 09:07

## 2022-07-13 RX ADMIN — ENOXAPARIN SODIUM 40 MG: 40 INJECTION SUBCUTANEOUS at 05:07

## 2022-07-13 RX ADMIN — PIPERACILLIN SODIUM, TAZOBACTAM SODIUM 4.5 G: 4; .5 INJECTION, POWDER, LYOPHILIZED, FOR SOLUTION INTRAVENOUS at 01:07

## 2022-07-13 RX ADMIN — POTASSIUM CHLORIDE, DEXTROSE MONOHYDRATE AND SODIUM CHLORIDE: 150; 5; 450 INJECTION, SOLUTION INTRAVENOUS at 11:07

## 2022-07-13 RX ADMIN — PANTOPRAZOLE SODIUM 40 MG: 40 INJECTION, POWDER, FOR SOLUTION INTRAVENOUS at 09:07

## 2022-07-13 RX ADMIN — MUPIROCIN: 20 OINTMENT TOPICAL at 09:07

## 2022-07-13 RX ADMIN — HYDRALAZINE HYDROCHLORIDE 10 MG: 20 INJECTION INTRAMUSCULAR; INTRAVENOUS at 08:07

## 2022-07-13 RX ADMIN — PIPERACILLIN SODIUM, TAZOBACTAM SODIUM 4.5 G: 4; .5 INJECTION, POWDER, LYOPHILIZED, FOR SOLUTION INTRAVENOUS at 05:07

## 2022-07-13 RX ADMIN — SODIUM CHLORIDE, PRESERVATIVE FREE 10 ML: 5 INJECTION INTRAVENOUS at 01:07

## 2022-07-13 RX ADMIN — SODIUM CHLORIDE, PRESERVATIVE FREE 10 ML: 5 INJECTION INTRAVENOUS at 06:07

## 2022-07-13 NOTE — PROGRESS NOTES
Discussed PT's recommendation for SNF placement with pt. Pt requested [facility] in [place]. Requested order from Dr. Dilcia Sebastian. Will initiate NH placement process.

## 2022-07-13 NOTE — PROGRESS NOTES
[facility] INTERNAL MEDICINE  INPATIENT PROGRESS NOTE    Resident Team: [facility] Medicine List 3  Attending Physician: Dr. Ramiro Carpenter MD    SUBJECTIVE:      HPI: Ms. Purdy is a 89-year-old africain american  Female with PMH for CHF (unquantifed), CAD s/p CABG, HTN was transferred from an outside hospital to [facility] ED with small-bowel obstruction on 07/10/22.  At the outside facility patient was found to have elevated troponin levels, howver the outside medical records were unavailable. Patient states that she has been having brown emesis and was unable to hold solid or liquid down. .  Patient states that she is still passing flatulence had a bowel movement the day before admission. Patient denied fever, chills, light headedness, dizziness, chest pain, shortness a breath, abdominal pain, hematuria, hematochezia, or dysuria.    Today: NAEO, patient leukocytosis has decreased to 13.9, continues on Zosyn (day 3). No further episodes of fever. Urine culture pending, one blood culture was obtained by surgery on 07/12/22. SBFT planned per arcadio, patient had one BM yesterday. Surgery stated yesterday that tube feeds could be started soon and to hold on IV nutrition. She had another BM today.    ROS:   CONSTITUTIONAL: No weight loss, subjective fever, chills, weakness or fatigue.  HEENT: Eyes: No visual loss, blurred vision, double vision or yellow sclerae. Ears, Nose, + dry mouth Throat: No hearing loss, sneezing, congestion, runny nose or sore throat.  SKIN: No rash or itching.  CARDIOVASCULAR: No chest pain, chest pressure or chest discomfort. No palpitations or edema.  RESPIRATORY: No shortness of breath, cough or sputum.  GASTROINTESTINAL: No anorexia, nausea, vomiting or diarrhea. + suprapubic pain  GENITOURINARY: No dysuria, hematuria, or incontinence  NEUROLOGICAL: No headache, dizziness, syncope, paralysis, ataxia, numbness or tingling in the extremities. No change in bowel or bladder control.  MUSCULOSKELETAL: No  "muscle, back pain, joint pain or stiffness.  HEMATOLOGIC: No anemia, bleeding or bruising.  LYMPHATICS: No enlarged nodes.  PSYCHIATRIC: No history of depression or anxiety.  ENDOCRINOLOGIC: No reports of sweating, cold or heat intolerance. No polyuria or polydipsia.  ALLERGIES: No history of asthma, hives, eczema or rhinitis.         OBJECTIVE:     Vital signs:   BP (!) 139/56   Pulse 99   Temp 97.9 °F (36.6 °C) (Oral)   Resp 20   Ht 5' 5" (1.651 m)   Wt 79 kg (174 lb 2.6 oz)   SpO2 99%   BMI 28.98 kg/m²      Physical Examination:  General: elderly w/o distress  HEENT: NC/AT; PERRLA; nasal and oral mucosa moist and clear; no sinus tenderness; no thyromegaly, NG tube in place on suction  Neck: Full ROM; no lymphadenopathy  Pulm: CTA bilaterally, normal work of breathing  CV: S1, S2 w/o murmurs or gallops; no edema noted  GI: Soft with hypoactive bowel sounds in all quadrants, no masses on palpation, tenderness to suprapubic region without guarding or rebound   MSK: Full ROM of all extremities and spine w/o limitation or discomfort  Derm: No rashes, abnormal bruising, or skin lesions  Neuro: AAOx4; CN II-XII intact; motor/sensory function intact  Psych: Cooperative; appropriate mood and affect    Laboratory:  Most Recent Data:  CBC:   Lab Results   Component Value Date    WBC 13.9 (H) 07/13/2022    HGB 8.8 (L) 07/13/2022    HCT 27.6 (L) 07/13/2022     07/13/2022    MCV 98.2 (H) 07/13/2022    RDW 13.5 07/13/2022     WBC Differential:   Recent Labs   Lab 07/10/22  0503 07/11/22  0411 07/12/22  0319 07/13/22  0326   WBC 6.4 12.5* 16.0* 13.9*   HGB 10.7* 9.6* 9.4* 8.8*   HCT 32.2* 28.0* 29.1* 27.6*    237 235 233   MCV 95.0* 94.3* 99.7* 98.2*     BMP:   Lab Results   Component Value Date     07/13/2022    K 4.0 07/13/2022    CO2 29 07/13/2022    BUN 19.7 07/13/2022    CREATININE 0.93 07/13/2022    CALCIUM 9.9 07/13/2022    MG 2.20 07/13/2022    PHOS 3.7 07/11/2022     LFTs:   Lab Results "   Component Value Date    ALBUMIN 2.8 (L) 07/13/2022    BILITOT 0.7 07/13/2022    AST 29 07/13/2022    ALKPHOS 67 07/13/2022    ALT 21 07/13/2022     Coags:   Lab Results   Component Value Date    INR 1.04 07/10/2022    PROTIME 13.4 07/10/2022    PTT 53.9 07/11/2022     FLP: No results found for: CHOL, HDL, LDLCALC, TRIG, CHOLHDL  DM:   Lab Results   Component Value Date    HGBA1C 5.1 07/10/2022    CREATININE 0.93 07/13/2022     Thyroid: No results found for: TSH, FREET4, Y5HUFYU, W9NUGWQ, THYROIDAB  Anemia:   Lab Results   Component Value Date    IRON 13 (L) 07/11/2022    TIBC 227 (L) 07/11/2022    FERRITIN 223.12 (H) 07/11/2022    RLWFCQXT09 1,629 (H) 07/12/2022    FOLATE 16.5 07/12/2022     Cardiac:   Lab Results   Component Value Date    TROPONINI 0.663 (H) 07/12/2022     Urinalysis:   Lab Results   Component Value Date    PHUA 5.5 07/11/2022    UROBILINOGEN Normal 07/11/2022    WBCUA >100 (A) 07/11/2022       Imaging:    Current meds:    aspirin  81 mg Oral Daily    enoxaparin  40 mg Subcutaneous Daily    mupirocin   Nasal BID    pantoprazole  40 mg Intravenous BID    piperacillin-tazobactam (ZOSYN) IVPB  4.5 g Intravenous Q8H    sodium chloride 0.9%  10 mL Intravenous Q6H         ASSESSMENT & PLAN:     Partial SBO  -management by general surgery team  -patient currently NPO  -Continue NG tube on suction per surgery  -Plan for small bowel follow with arcadio  -Patient had one BM yesterday  -Surgery will start tube feeds soon, no IV nutrition for now    NSTEMI Type II  -trending troponin x3 q6h, 0.190-->0.594-->0.740-->0.663, cardiology states this is demand ischemia  -EKG shows showed ST depressions in the inferior leads and V2, V3, V4, V5, V6, and AVL with no ST segment elevations  -Echocardiogram TTE shows EF 70%, hyperdynamic, mild MR, IVC with <50% inspiration collapse, Grade I DD   -Continue ASA 81mg daily  -No further episodes of chest pain    Leukocytosis  UTI  -UA shows leukesterase 25, ,  and bacterial, some signs of dysuria and pubic tenderness  -urine culture is pending, blood culture pending  -1 episode of fever at 100.4F on 7/11/22  -Currently on Zosyn 4.5g q8h (day 3)    Hypokalemia   -Potassium 4.0 today, continue to monitor  -Continue daily Mag level    Normocytic Anemia   -H&H 8.8/27., MCV 98.2  -repleted iron with IV ferric gluconate   -borderline macrocytic, folate and Vit B12 are not decreased        DVT PPx: Lovenox 40mg  GI PPx: Protonix 40mg BID  Diet: NPO  ABX: Zosyn 4.5g (day 3)  Fluids: NS 75cc/h  O2: Room air  PCP: Primary Doctor No    Disposition (07/13/2022): Continue on Zosyn (day 3), urine culture and blood culture pending.  Plan for small bowel follow through, will speak with surgery again about nutrition plan.     Radames Loomis, DO   [facility], PGY-3

## 2022-07-13 NOTE — PT/OT/SLP PROGRESS
Physical Therapy Treatment    Patient Name:  Zackery Purdy   MRN:  [id number]    Recommendations:     Discharge Recommendations:  nursing facility, skilled, home health PT   Discharge Equipment Recommendations: walker, rolling, shower chair, bedside commode   Barriers to discharge: Severity of deficits    Assessment:     Zackery Purdy is a 89 y.o. female admitted with a medical diagnosis of <principal problem not specified>.  She presents with the following impairments/functional limitations:  weakness, impaired functional mobilty, decreased safety awareness, impaired endurance, gait instability, impaired balance, impaired self care skills, decreased lower extremity function, edema.    Rehab Prognosis: Good; patient would benefit from acute skilled PT services to address these deficits and reach maximum level of function.    Recent Surgery: * No surgery found *      Plan:     During this hospitalization, patient to be seen  (3-5 TIMES/WEEK) to address the identified rehab impairments via gait training, therapeutic activities, therapeutic exercises and progress toward the following goals:    · Plan of Care Expires:  08/08/22    Subjective     Chief Complaint: Pt. With c/o not getting enough sleep last night.  Patient/Family Comments/goals: None stated today.  Pain/Comfort:  · Pain Rating 1: 0/10      Objective:     Communicated with nurse (Jose Manuel) prior to session.  Patient found HOB elevated with NG tube, telemetry, PICC line upon PT entry to room.     General Precautions: Standard, fall, vision impaired   Orthopedic Precautions:Full weight bearing   Braces: N/A  Respiratory Status: Room air     Functional Mobility:  · Bed Mobility:     · Rolling Right: supervision  · Supine to Sit: minimum assistance for L LE assistance  · Transfers:     · Sit to Stand:  contact guard assistance with rolling walker  · Toilet Transfer: contact guard assistance with  rolling walker and bedside commode  using  Stand Pivot  · Gait: x60  feet with RW and CGA          Therapeutic Activities and Exercises:   Pt. With blood leaking from IV site during gait; nurse notified and pt. Was cleaned up.  Pt. Transferred from bed>BSC with RW and CGA 2* to needing to void and have BM.      Patient left seated on BSC  with all lines intact, call button in reach and nurse notified..    GOALS:   Multidisciplinary Problems     Physical Therapy Goals        Problem: Physical Therapy    Goal Priority Disciplines Outcome Goal Variances Interventions   Physical Therapy Goal     PT, PT/OT Ongoing, Progressing     Description: Goals to be met by: DISCHARGE     Patient will increase functional independence with mobility by performing:    -. Supine to sit with Stand-by Assistance - ONGOING  -. Sit to supine with Stand-by Assistance - ONGOING  -. Rolling to Left and Right with Modified [place] - ONGOING  -. Sit to stand transfer with Supervision - ONGOING  -. Gait  x 65ft x2 with Supervision using Rolling Walker - ONGOING  -. Ascend/descend 3 stair with bilateral Handrails Supervision using No Assistive Device - ONGOING                     Time Tracking:     PT Received On: 07/13/22  PT Start Time: 0917     PT Stop Time: 0947  PT Total Time (min): 30 min     Billable Minutes: Gait Training 15 and Therapeutic Activity 15    Treatment Type: Treatment  PT/PTA: PT           07/13/2022

## 2022-07-13 NOTE — PT/OT/SLP PROGRESS
Occupational Therapy   Treatment    Name: Zackery Purdy  MRN: [id number]  Admitting Diagnosis:    Patient Active Problem List   Diagnosis    SBO (small bowel obstruction)    NSTEMI (non-ST elevated myocardial infarction)    Hyponatremia    Elevated troponin    Tachyarrhythmia    Hypokalemia    Normocytic anemia    Coronary artery disease involving coronary bypass graft of native heart       Recommendations:     Discharge Recommendations: nursing facility, skilled  Discharge Equipment Recommendations:  none  Barriers to discharge:  None    Assessment:     Zackery Purdy is a 89 y.o. female with a medical diagnosis of   Patient Active Problem List   Diagnosis    SBO (small bowel obstruction)    NSTEMI (non-ST elevated myocardial infarction)    Hyponatremia    Elevated troponin    Tachyarrhythmia    Hypokalemia    Normocytic anemia    Coronary artery disease involving coronary bypass graft of native heart     .  She presents with functional decline. Performance deficits affecting function are weakness, impaired endurance, impaired functional mobilty, impaired balance, impaired self care skills.     Rehab Prognosis:  Good; patient would benefit from acute skilled OT services to address these deficits and reach maximum level of function.       Plan:     Patient to be seen  (2-5 times per week, M-F) to address the above listed problems via self-care/home management, therapeutic activities, therapeutic exercises  · Plan of Care Expires:  (D/C)  · Plan of Care Reviewed with: patient    Subjective     Pain/Comfort:  · Pain Rating 1: 0/10  · Pain Rating Post-Intervention 1: 0/10    Objective:     Communicated with: nurse Richard prior to session.  Patient found sitting edge of bed with telemetry, peripheral IV upon OT entry to room.    General Precautions: Standard, fall   Orthopedic Precautions:N/A   Braces:    Respiratory Status: Room air     Occupational Performance:       Functional  Mobility/Transfers:  · Patient completed Sit <> Stand Transfer with contact guard assistance  with  rolling walker     Treatment & Education:  Pt worked on several reps of sit to/from stand with use of RW, performing unilateral UE therex with AAROM ex's, A for full available ranges, 15 reps x 2 GM skills, small rest break seated on EOB secondary to fatigue.  Pt did put forth good effort for session.    Patient left sitting edge of bed with all lines intact, call button in reach and nurse Jose Manuel notifiedEducation:      GOALS:   Multidisciplinary Problems     Occupational Therapy Goals        Problem: Occupational Therapy    Goal Priority Disciplines Outcome Interventions   Occupational Therapy Goal     OT, PT/OT Ongoing, Progressing    Description: Goals to be met by: d/c    Patient will increase functional independence with ADLs by performing:    LE Dressing with Modified [place].    Grooming while standing with Modified [place].    Rolling to Bilateral with [place].     Supine to sit with [place].    Stand pivot transfers with Modified [place].                     Time Tracking:     OT Date of Treatment: 07/13/22  OT Start Time: 1440  OT Stop Time: 1538  OT Total Time (min): 58 min    Billable Minutes:Self Care/Home Management 10  Therapeutic Exercise 48    OT/ISAEL: OT          7/13/2022

## 2022-07-13 NOTE — PROGRESS NOTES
d   [facility] - [facility] Team  Daily Progress Note    Patient ID: Zackery Purdy     [facility] Day: 4    Subjective:  Pt reports that she did have a BM yesterday prior to going for SB follow through. Patient reports no appetite, CP, SOB. Denies fevers/nausea.    Objective:  Problem List:  Patient Active Problem List   Diagnosis    SBO (small bowel obstruction)    NSTEMI (non-ST elevated myocardial infarction)    Hyponatremia    Elevated troponin    Tachyarrhythmia    Hypokalemia    Normocytic anemia    Coronary artery disease involving coronary bypass graft of native heart       Vitals:  Temp:  [97.4 °F (36.3 °C)-98.7 °F (37.1 °C)] 97.9 °F (36.6 °C)  Pulse:  [] 99  SpO2:  [93 %-99 %] 99 %  BP: (134-182)/(53-76) 139/56    Intake/Output:   In: -   Out: 150 [Drains:150]  I/O last 3 completed shifts:  In: 2150 [I.V.:1350; IV Piggyback:800]  Out: 4600 [Urine:2600; Drains:2000]    Labs:  Recent Labs   Lab 22  0411 22  1512 22  0319 22  0326 22  0412   HGB 9.6*  --  9.4* 8.8*  --    WBC 12.5*  --  16.0* 13.9*  --      --  235 233  --    BUN 16.3 17.4 17.7  --  19.7   CREATININE 0.75 0.83 0.83  --  0.93   MG 1.70  --  2.20  --  2.20   K 3.2* 4.1 3.8  --  4.0   }    Physical Exam:  Gen: NAD  Neuro: awake, alert, answering questions appropriately  Resp: non-labored breathing,   Abd: soft, ND, Mild tenderness throughout abdomen.  Ext: moves all 4 spontaneously and purposefully  Skin: warm, well perfused    Imagin22 SBFT: Contrast progresses normally through the small bowel and reaches the colon by 2 hours.         Assessment/Plan:  89 y.o.female with NSTEMI, SBO.    -Pt passed SBFT  - Continue NG tube, NPO.  - WBC of 13.9 today, down from 16.  -taken off heparin, now on ASA  - Troponins .663 last evening, down from .74  - Awaiting urine and blood cultures results  - PT/OT recommends SNF Placement vs. home w/ responsible caregiver w/ HH w/ HHPT    Kenroy Perez,  MD   [facility], PGY1  07/13/2022 7:53 AM       PGY3 addendum  Patient seen and examined this morning. Having bowel function since SBFT. Voiding spontaneously.     Leukocytosis improved 14 from 16  NGT output 450ml    Clamp NGT this morning  Continue abx for UTI. Will check with micro lab that cx pending    Dilcia Sebastian

## 2022-07-13 NOTE — MEDICAL/APP STUDENT
[facility] Progress Note    Subjective   Patient reports having a loose BM at 9 AM yesterday prior to going for SB obstruction follow through. Patient denies nausea, SOB, chest pain, and vomiting. She report having a 4/10 acute tenderness in the RLQ near the groin before her BM and SBO follow-up. She regularly spits up foamy mucus. 150 ml NG of brown NG tube drainage in past 24 hrs. Troponin is down to .663 from .74.    Objective  Temp:  [97.4 °F (36.3 °C)-98.7 °F (37.1 °C)] 97.9 °F (36.6 °C)  Pulse:  [] 99  SpO2:  [93 %-99 %] 99 %  BP: (134-182)/(53-70) 139/56    I/O this shift:  In: 0   Out: 150 [Drains:150]  I/O last 3 completed shifts:  In: 2875 [I.V.:2075; IV Piggyback:800]  Out: 1800 [Urine:900; Drains:900]    Gen: NAD, AAOx3  CV: extremities wwp,  Pulm: no increased wob  Skin: Warm, well perfused.    Labs:      Recent Labs   Lab 07/10/22  0503 07/10/22  1559 22  0411 22  0641 22  1512 22  0319 22  0326 22  0412   WBC 6.4  --    < >  --   --  16.0* 13.9*  --    HGB 10.7*  --    < >  --   --  9.4* 8.8*  --    HCT 32.2*  --    < >  --   --  29.1* 27.6*  --      --    < >  --   --  235 233  --    INR  --  1.04  --   --   --   --   --   --    *  --    < >  --    < > 139  --  142   CHLORIDE 95*  --    < >  --    < > 102  --  106   CO2 27  --    < >  --    < > 28  --  29   BUN 17.0  --    < >  --    < > 17.7  --  19.7   CREATININE 0.97  --    < >  --    < > 0.83  --  0.93   GLUCOSE 110  --    < >  --    < > 97  --  131*   CALCIUM 10.4*  --    < >  --    < > 9.5  --  9.9   MG 2.10  --    < >  --   --  2.20  --  2.20   PHOS 3.7  --   --  3.7  --   --   --   --    ALKPHOS 87  --    < >  --   --  73  --  67    < > = values in this interval not displayed.     Imagin/13/22 SBFT: Contrast progresses normally through the small bowel and reaches the colon by 2 hours.      A/P:    89 y.o. female with possible NSTEMI and possible SBO.    - Continue NG  tube, BPO  - Await urine and blood culture results  - PT/OT recommends SNF Placement vs. home w/ responsible caregiver w/ HH w/ HHPT

## 2022-07-14 LAB
ALBUMIN SERPL-MCNC: 2.7 GM/DL (ref 3.4–4.8)
ALBUMIN/GLOB SERPL: 0.8 RATIO (ref 1.1–2)
ALP SERPL-CCNC: 62 UNIT/L (ref 40–150)
ALT SERPL-CCNC: 21 UNIT/L (ref 0–55)
AST SERPL-CCNC: 32 UNIT/L (ref 5–34)
BACTERIA UR CULT: NO GROWTH
BASOPHILS # BLD AUTO: 0.04 X10(3)/MCL (ref 0–0.2)
BASOPHILS NFR BLD AUTO: 0.4 %
BILIRUBIN DIRECT+TOT PNL SERPL-MCNC: 0.7 MG/DL
BUN SERPL-MCNC: 16.8 MG/DL (ref 9.8–20.1)
CALCIUM SERPL-MCNC: 9.4 MG/DL (ref 8.4–10.2)
CHLORIDE SERPL-SCNC: 105 MMOL/L (ref 98–111)
CO2 SERPL-SCNC: 24 MMOL/L (ref 23–31)
CREAT SERPL-MCNC: 0.88 MG/DL (ref 0.55–1.02)
EOSINOPHIL # BLD AUTO: 0.35 X10(3)/MCL (ref 0–0.9)
EOSINOPHIL NFR BLD AUTO: 3.5 %
ERYTHROCYTE [DISTWIDTH] IN BLOOD BY AUTOMATED COUNT: 13.4 % (ref 11.5–17)
GLOBULIN SER-MCNC: 3.2 GM/DL (ref 2.4–3.5)
GLUCOSE SERPL-MCNC: 111 MG/DL (ref 75–121)
HCT VFR BLD AUTO: 25.1 % (ref 37–47)
HGB BLD-MCNC: 8.3 GM/DL (ref 12–16)
IMM GRANULOCYTES # BLD AUTO: 0.06 X10(3)/MCL (ref 0–0.04)
IMM GRANULOCYTES NFR BLD AUTO: 0.6 %
LYMPHOCYTES # BLD AUTO: 1.26 X10(3)/MCL (ref 0.6–4.6)
LYMPHOCYTES NFR BLD AUTO: 12.6 %
MAGNESIUM SERPL-MCNC: 2 MG/DL (ref 1.6–2.6)
MCH RBC QN AUTO: 31.9 PG (ref 27–31)
MCHC RBC AUTO-ENTMCNC: 33.1 MG/DL (ref 33–36)
MCV RBC AUTO: 96.5 FL (ref 80–94)
MONOCYTES # BLD AUTO: 1.28 X10(3)/MCL (ref 0.1–1.3)
MONOCYTES NFR BLD AUTO: 12.8 %
NEUTROPHILS # BLD AUTO: 7 X10(3)/MCL (ref 2.1–9.2)
NEUTROPHILS NFR BLD AUTO: 70.1 %
NRBC BLD AUTO-RTO: 0 %
PLATELET # BLD AUTO: 236 X10(3)/MCL (ref 130–400)
PMV BLD AUTO: 9.8 FL (ref 7.4–10.4)
POTASSIUM SERPL-SCNC: 3.8 MMOL/L (ref 3.5–5.1)
PROT SERPL-MCNC: 5.9 GM/DL (ref 5.8–7.6)
RBC # BLD AUTO: 2.6 X10(6)/MCL (ref 4.2–5.4)
SARS-COV-2 RDRP RESP QL NAA+PROBE: NEGATIVE
SODIUM SERPL-SCNC: 136 MMOL/L (ref 132–146)
WBC # SPEC AUTO: 10 X10(3)/MCL (ref 4.5–11.5)

## 2022-07-14 PROCEDURE — 63600175 PHARM REV CODE 636 W HCPCS: Performed by: STUDENT IN AN ORGANIZED HEALTH CARE EDUCATION/TRAINING PROGRAM

## 2022-07-14 PROCEDURE — 63600175 PHARM REV CODE 636 W HCPCS

## 2022-07-14 PROCEDURE — 83735 ASSAY OF MAGNESIUM: CPT

## 2022-07-14 PROCEDURE — 84075 ASSAY ALKALINE PHOSPHATASE: CPT

## 2022-07-14 PROCEDURE — 25000003 PHARM REV CODE 250: Performed by: STUDENT IN AN ORGANIZED HEALTH CARE EDUCATION/TRAINING PROGRAM

## 2022-07-14 PROCEDURE — 93005 ELECTROCARDIOGRAM TRACING: CPT

## 2022-07-14 PROCEDURE — 25000003 PHARM REV CODE 250

## 2022-07-14 PROCEDURE — 97535 SELF CARE MNGMENT TRAINING: CPT

## 2022-07-14 PROCEDURE — 63600175 PHARM REV CODE 636 W HCPCS: Performed by: SURGERY

## 2022-07-14 PROCEDURE — C9113 INJ PANTOPRAZOLE SODIUM, VIA: HCPCS

## 2022-07-14 PROCEDURE — 21400001 HC TELEMETRY ROOM

## 2022-07-14 PROCEDURE — 36415 COLL VENOUS BLD VENIPUNCTURE: CPT

## 2022-07-14 PROCEDURE — 87635 SARS-COV-2 COVID-19 AMP PRB: CPT | Performed by: STUDENT IN AN ORGANIZED HEALTH CARE EDUCATION/TRAINING PROGRAM

## 2022-07-14 PROCEDURE — A4216 STERILE WATER/SALINE, 10 ML: HCPCS

## 2022-07-14 PROCEDURE — 85025 COMPLETE CBC W/AUTO DIFF WBC: CPT

## 2022-07-14 PROCEDURE — 94761 N-INVAS EAR/PLS OXIMETRY MLT: CPT

## 2022-07-14 PROCEDURE — C1751 CATH, INF, PER/CENT/MIDLINE: HCPCS

## 2022-07-14 PROCEDURE — 80053 COMPREHEN METABOLIC PANEL: CPT

## 2022-07-14 PROCEDURE — 25000003 PHARM REV CODE 250: Performed by: SURGERY

## 2022-07-14 RX ORDER — PANTOPRAZOLE SODIUM 40 MG/1
40 FOR SUSPENSION ORAL DAILY
Status: DISCONTINUED | OUTPATIENT
Start: 2022-07-15 | End: 2022-07-15 | Stop reason: HOSPADM

## 2022-07-14 RX ORDER — FUROSEMIDE 20 MG/1
40 TABLET ORAL DAILY
Status: DISCONTINUED | OUTPATIENT
Start: 2022-07-15 | End: 2022-07-15 | Stop reason: HOSPADM

## 2022-07-14 RX ORDER — POTASSIUM CHLORIDE 20 MEQ/1
20 TABLET, EXTENDED RELEASE ORAL DAILY
Status: DISCONTINUED | OUTPATIENT
Start: 2022-07-14 | End: 2022-07-15 | Stop reason: HOSPADM

## 2022-07-14 RX ORDER — LOSARTAN POTASSIUM 25 MG/1
25 TABLET ORAL DAILY
Status: DISCONTINUED | OUTPATIENT
Start: 2022-07-15 | End: 2022-07-15 | Stop reason: HOSPADM

## 2022-07-14 RX ORDER — FUROSEMIDE 20 MG/1
20 TABLET ORAL DAILY
Status: DISCONTINUED | OUTPATIENT
Start: 2022-07-14 | End: 2022-07-14

## 2022-07-14 RX ADMIN — POTASSIUM CHLORIDE 20 MEQ: 1500 TABLET, EXTENDED RELEASE ORAL at 11:07

## 2022-07-14 RX ADMIN — POTASSIUM CHLORIDE, DEXTROSE MONOHYDRATE AND SODIUM CHLORIDE: 150; 5; 450 INJECTION, SOLUTION INTRAVENOUS at 03:07

## 2022-07-14 RX ADMIN — SODIUM CHLORIDE, PRESERVATIVE FREE 10 ML: 5 INJECTION INTRAVENOUS at 05:07

## 2022-07-14 RX ADMIN — HYDRALAZINE HYDROCHLORIDE 10 MG: 20 INJECTION INTRAMUSCULAR; INTRAVENOUS at 09:07

## 2022-07-14 RX ADMIN — PIPERACILLIN SODIUM, TAZOBACTAM SODIUM 4.5 G: 4; .5 INJECTION, POWDER, LYOPHILIZED, FOR SOLUTION INTRAVENOUS at 01:07

## 2022-07-14 RX ADMIN — ENOXAPARIN SODIUM 40 MG: 40 INJECTION SUBCUTANEOUS at 05:07

## 2022-07-14 RX ADMIN — PANTOPRAZOLE SODIUM 40 MG: 40 INJECTION, POWDER, FOR SOLUTION INTRAVENOUS at 08:07

## 2022-07-14 RX ADMIN — ASPIRIN 81 MG: 81 TABLET, COATED ORAL at 08:07

## 2022-07-14 RX ADMIN — SODIUM CHLORIDE, PRESERVATIVE FREE 10 ML: 5 INJECTION INTRAVENOUS at 12:07

## 2022-07-14 RX ADMIN — MUPIROCIN: 20 OINTMENT TOPICAL at 09:07

## 2022-07-14 RX ADMIN — SODIUM CHLORIDE, PRESERVATIVE FREE 10 ML: 5 INJECTION INTRAVENOUS at 11:07

## 2022-07-14 RX ADMIN — PIPERACILLIN SODIUM, TAZOBACTAM SODIUM 4.5 G: 4; .5 INJECTION, POWDER, LYOPHILIZED, FOR SOLUTION INTRAVENOUS at 08:07

## 2022-07-14 RX ADMIN — FUROSEMIDE 20 MG: 20 TABLET ORAL at 11:07

## 2022-07-14 RX ADMIN — MELATONIN TAB 3 MG 6 MG: 3 TAB at 09:07

## 2022-07-14 NOTE — PLAN OF CARE
07/14/22 1232   Medicare Message   Important Message from Medicare regarding Discharge Appeal Rights Given to patient/caregiver;Explained to patient/caregiver

## 2022-07-14 NOTE — MEDICAL/APP STUDENT
[facility] - [facility] Team  Daily Progress Note    Patient ID: Zackery Purdy    [facility] Day: 5    Subjective:  Pt has been having loose bowel movements since SB follow through. She had her NG tube removed yesterday and has been on a liquid diet. She denies nausea/vomiting, though she has been feeling the need to spit after she drinks something. She denies appetite, chest pain, SOB.     Objective:  Problem List:  Active Problem List with Overview Notes    Diagnosis Date Noted    NSTEMI (non-ST elevated myocardial infarction)     Hyponatremia     Elevated troponin     Tachyarrhythmia     Hypokalemia     Normocytic anemia     Coronary artery disease involving coronary bypass graft of native heart     SBO (small bowel obstruction)        Vitals:  Vitals:    22 0306   BP: (!) 153/67   Pulse: 94   Resp:    Temp: 98.4 °F (36.9 °C)        Intake/Output:  Not recorded. Pt has been having BM's and voiding spontaneously. Has been consuming liquid diet.     Physical Exam:  Gen: NAD  Neuro: awake, alert, answering questions appropriately  Resp: non-labored breathing, INDIGO  Abd: soft, ND, mild tenderness throughout abdomen  Ext: moves all 4 spontaneously and purposefully  Skin: warm, well perfused      Labs:  WBC - 10      Imagin22 SBFT: Contrast progresses normally through the small bowel and reaches the colon by 2 hours.    Micro/Path/Other:  Blood culture (22) - pending, no growth at 24 hrs  Urine culture (22) - pending, no growth at 24 hrs    Assessment/Plan:  89 y.o female with NSTEMI, SBO.    - Passed SBFT  - Pt on liquid diet, ADAT  - Troponins downward trending  - Awaiting urine and blood culture results  -PT/OT recommends SNF Placement vs. home w/ responsible caregiver w/ HH w/ HHPT    Mitra Ellis, MS3  2022 6:12 AM

## 2022-07-14 NOTE — PT/OT/SLP PROGRESS
Occupational Therapy   Treatment    Name: Zackery Purdy  MRN: [id number]  Admitting Diagnosis:    Patient Active Problem List   Diagnosis    SBO (small bowel obstruction)    NSTEMI (non-ST elevated myocardial infarction)    Hyponatremia    Elevated troponin    Tachyarrhythmia    Hypokalemia    Normocytic anemia    Coronary artery disease involving coronary bypass graft of native heart       Recommendations:     Discharge Recommendations: nursing facility, skilled  Discharge Equipment Recommendations:  none  Barriers to discharge:  None    Assessment:     Zackery Purdy is a 89 y.o. female with a medical diagnosis of   Patient Active Problem List   Diagnosis    SBO (small bowel obstruction)    NSTEMI (non-ST elevated myocardial infarction)    Hyponatremia    Elevated troponin    Tachyarrhythmia    Hypokalemia    Normocytic anemia    Coronary artery disease involving coronary bypass graft of native heart     .  She presents with functional decline. Performance deficits affecting function are weakness, impaired endurance, impaired functional mobilty, impaired balance, impaired self care skills.     Rehab Prognosis:  Good; patient would benefit from acute skilled OT services to address these deficits and reach maximum level of function.       Plan:     Patient to be seen  (2-5 times per week, M-F) to address the above listed problems via self-care/home management, therapeutic activities, therapeutic exercises  · Plan of Care Expires:  (D/C)  · Plan of Care Reviewed with: patient    Subjective     Pain/Comfort:  · Pain Rating 1: 0/10    Objective:     Communicated with: nurse Crandall prior to session.  Patient found supine with telemetry, peripheral IV upon OT entry to room.    General Precautions: Standard, fall   Orthopedic Precautions:N/A   Braces:    Respiratory Status: Room air     Occupational Performance:     Bed Mobility:    · Patient completed Rolling/Turning to Right with minimum  assistance  · Patient completed Supine to Sit with minimum assistance  · Patient completed Sit to Supine with minimum assistance     Functional Mobility/Transfers:  · Patient completed Sit <> Stand Transfer with minimum assistance  with  rolling walker     Activities of Daily Living:  · Bathing: moderate assistance seated on EOB for sponge bath.  Pt able to edith B  socks with min A, underpants with min A, use of RW.  Pt performed brushing teeth I'ly with set up.  Toileting with min A with use of BSC, RW for transfer with min A.      Patient left supine with all lines intact, call button in reach and nurse Karley notifiedEducation:      GOALS:   Multidisciplinary Problems     Occupational Therapy Goals        Problem: Occupational Therapy    Goal Priority Disciplines Outcome Interventions   Occupational Therapy Goal     OT, PT/OT Ongoing, Progressing    Description: Goals to be met by: d/c    Patient will increase functional independence with ADLs by performing:    LE Dressing with Modified [place].    Grooming while standing with Modified [place].    Rolling to Bilateral with [place].     Supine to sit with [place].    Stand pivot transfers with Modified [place].                     Time Tracking:     OT Date of Treatment: 07/14/22  OT Start Time: 1400  OT Stop Time: 1500  OT Total Time (min): 60 min    Billable Minutes:Self Care/Home Management 60 min    OT/ISAEL: OT          7/14/2022

## 2022-07-14 NOTE — PROGRESS NOTES
[facility]   [facility] Team    NAEON. AF, Hypertensive.  NGT removed yesterday. Tolerating CLD w/out N/V  1x BM this morning, still loose  Voiding, denies dysuria  Working with PT/OT    PE:  NAD  RRR  Non-labored breathing, INDIGO  S, ND, NT  Moves all extremities    WBC 10 (14)  Cr 0.9    Blood and urine cx NGTD    89 yo F presenting with partial SBO and NSTEMI. Obstruction resolved     - Medicine following, will de-escalate abx today.   - D/c IVF. Start regular diet  - Pt/OT  - SNF placement    Dilcia Sebastian MD   [facility], PGY4  07/14/2022 7:58 AM

## 2022-07-14 NOTE — PROGRESS NOTES
"[facility]  Adult Nutrition  Progress Note    SUMMARY       Recommendations    Recommendation/Intervention: 1. continue heart healthy diet; 2. will order boost supplement tid 3. MVI/fe 4. biweekly wt. Will monitor nutriiton status  Goals: Meet > 75% nutriton needs  Nutrition Goal Status: progressing towards goal  Communication of RD Recs: reviewed with RN    Assessment and Plan    Pt reported feeling better; NGT out; diet progressed to solid food; no N/V; appetite increasing; willing to drink oral supplement; no new wt. + edema feet / noted. Labs acknowledged.   pulmonary embolism  Nutrition Problem  Inadequate Oral Intake    Related to (etiology):   SBO/resolving    Signs and Symptoms (as evidenced by):   Hx decreased po intake; slowly increasing     Interventions(treatment strategy):  General / Healthful Diet, Commercial beverage and Collaboration with other providers    Nutrition Diagnosis Status:   Improving        Malnutrition Assessment  Malnutrition Type: other (see comments) (pt does not meet malnutiriton criteria for any categoy. (not-wt loss; po intake; fat/muscle loss)  + LE edema noted.           Reason for Assessment    Reason For Assessment: RD follow-up  Diagnosis: other (see comments), gastrointestinal disease (SBO, NSTEMI, leukocytosis,hypokalemia, anemia)  Relevant Medical History: CHF, CAD, CABG, HTN    Nutrition Risk Screen    Nutrition Risk Screen: no indicators present    Nutrition/Diet History    Patient Reported Diet/Restrictions/Preferences: general  Spiritual, Cultural Beliefs, Amish Practices, Values that Affect Care: no  Food Allergies: NKFA  Factors Affecting Nutritional Intake: decreased appetite    Anthropometrics    Temp: 98.5 °F (36.9 °C)  Height: 5' 5" (165.1 cm)  Height (inches): 65 in  Weight Method: Bed Scale  Weight: 79 kg (174 lb 2.6 oz)  Weight (lb): 174.17 lb  Ideal Body Weight (IBW), Female: 125 lb  % Ideal Body Weight, Female (lb): 139.2 " %  BMI (Calculated): 29  BMI Grade: 25 - 29.9 - overweight  Usual Body Weight (UBW), k kg  % Usual Body Weight: 100.21  % Weight Change From Usual Weight: 0 %       Lab/Procedures/Meds    Pertinent Labs Reviewed: reviewed  Pertinent Labs Comments: (7-14) Gluc 111 Bun 16.8 Cr 0.8 Alb 2.7(L) H/H 8.3/25.1(L)  Pertinent Medications Reviewed: reviewed  Pertinent Medications Comments: pantoprazole, zosyn      Estimated/Assessed Needs    Weight Used For Calorie Calculations: 79.1 kg (174 lb 6.1 oz)  Energy Calorie Requirements (kcal): 1977kcal/d; 25 dale/kg  Energy Need Method: Kcal/kg  Protein Requirements: 95 gm protein/d; 1.2 gm/kg  Weight Used For Protein Calculations: 79.1 kg (174 lb 6.1 oz)     Estimated Fluid Requirement Method: RDA Method  RDA Method (mL):          Nutrition Prescription Ordered    Current Diet Order: Heart Healthy diet    Evaluation of Received Nutrient/Fluid Intake    Energy Calories Required: meeting needs  Protein Required: meeting needs  Fluid Required: meeting needs  Total Fluid Intake (mL/kg): IVF @ 75m/hr  Tolerance: tolerating  % Intake of Estimated Energy Needs: 50 - 75 %  % Meal Intake: 50 - 75 %    Nutrition Risk    Level of Risk/Frequency of Follow-up: moderate     Monitor and Evaluation    Food and Nutrient Intake: food and beverage intake  Food and Nutrient Adminstration: diet order  Knowledge/Beliefs/Attitudes: food and nutrition knowledge/skill  Anthropometric Measurements: weight  Biochemical Data, Medical Tests and Procedures: electrolyte and renal panel     Nutrition Follow-Up    RD Follow-up?: Yes

## 2022-07-14 NOTE — PT/OT/SLP PROGRESS
Occupational Therapy      Patient Name:  Zackery Purdy   MRN:  [id number]    Patient not seen today secondary to pt toileting at this time. Will follow-up as pt waqas and schedule permits.    7/14/2022

## 2022-07-14 NOTE — PROGRESS NOTES
[facility] INTERNAL MEDICINE  INPATIENT PROGRESS NOTE    Resident Team: [facility] Medicine List 3  Attending Physician: Dr. Ramiro Carpenter MD    SUBJECTIVE:      HPI: Ms. Purdy is a 89-year-old africain american  Female with PMH for CHF (unquantifed), CAD s/p CABG, HTN was transferred from an outside hospital to [facility] ED with small-bowel obstruction on 07/10/22.  At the outside facility patient was found to have elevated troponin levels, howver the outside medical records were unavailable. Patient states that she has been having brown emesis and was unable to hold solid or liquid down. .  Patient states that she is still passing flatulence had a bowel movement the day before admission. Patient denied fever, chills, light headedness, dizziness, chest pain, shortness a breath, abdominal pain, hematuria, hematochezia, or dysuria.    Today: NAEO, patient leukocytosis has resolved. Urine culture and blood culture negative at 24 hours, will de-escalate antibiotics. She is now having BMs and SBFT was normal. Pending nursing home placement will case management.     ROS:   CONSTITUTIONAL: No weight loss, subjective fever, chills, weakness or fatigue.  HEENT: Eyes: No visual loss, blurred vision, double vision or yellow sclerae. Ears, Nose, + dry mouth Throat: No hearing loss, sneezing, congestion, runny nose or sore throat.  SKIN: No rash or itching.  CARDIOVASCULAR: No chest pain, chest pressure or chest discomfort. No palpitations or edema.  RESPIRATORY: No shortness of breath, cough or sputum.  GASTROINTESTINAL: No anorexia, nausea, vomiting or diarrhea. + suprapubic pain  GENITOURINARY: No dysuria, hematuria, or incontinence  NEUROLOGICAL: No headache, dizziness, syncope, paralysis, ataxia, numbness or tingling in the extremities. No change in bowel or bladder control.  MUSCULOSKELETAL: No muscle, back pain, joint pain or stiffness.  HEMATOLOGIC: No anemia, bleeding or bruising.  LYMPHATICS: No enlarged nodes.  PSYCHIATRIC: No  "history of depression or anxiety.  ENDOCRINOLOGIC: No reports of sweating, cold or heat intolerance. No polyuria or polydipsia.  ALLERGIES: No history of asthma, hives, eczema or rhinitis.         OBJECTIVE:     Vital signs:   BP (!) 153/67   Pulse 94   Temp 98.4 °F (36.9 °C) (Oral)   Resp 20   Ht 5' 5" (1.651 m)   Wt 79 kg (174 lb 2.6 oz)   SpO2 96%   BMI 28.98 kg/m²      Physical Examination:  General: elderly w/o distress  HEENT: NC/AT; PERRLA; nasal and oral mucosa moist and clear; no sinus tenderness; no thyromegaly, NG tube in place on suction  Neck: Full ROM; no lymphadenopathy  Pulm: CTA bilaterally, normal work of breathing  CV: S1, S2 w/o murmurs or gallops; no edema noted  GI: Soft with bowel sounds in all quadrants, no masses on palpation, no tenderness, guarding or rebound   MSK: Full ROM of all extremities and spine w/o limitation or discomfort  Derm: No rashes, abnormal bruising, or skin lesions  Neuro: AAOx4; CN II-XII intact; motor/sensory function intact  Psych: Cooperative; appropriate mood and affect    Laboratory:  Most Recent Data:  CBC:   Lab Results   Component Value Date    WBC 10.0 07/14/2022    HGB 8.3 (L) 07/14/2022    HCT 25.1 (L) 07/14/2022     07/14/2022    MCV 96.5 (H) 07/14/2022    RDW 13.4 07/14/2022     WBC Differential:   Recent Labs   Lab 07/10/22  0503 07/11/22  0411 07/12/22  0319 07/13/22  0326 07/14/22  0302   WBC 6.4 12.5* 16.0* 13.9* 10.0   HGB 10.7* 9.6* 9.4* 8.8* 8.3*   HCT 32.2* 28.0* 29.1* 27.6* 25.1*    237 235 233 236   MCV 95.0* 94.3* 99.7* 98.2* 96.5*     BMP:   Lab Results   Component Value Date     07/14/2022    K 3.8 07/14/2022    CO2 24 07/14/2022    BUN 16.8 07/14/2022    CREATININE 0.88 07/14/2022    CALCIUM 9.4 07/14/2022    MG 2.00 07/14/2022    PHOS 3.7 07/11/2022     LFTs:   Lab Results   Component Value Date    ALBUMIN 2.7 (L) 07/14/2022    BILITOT 0.7 07/14/2022    AST 32 07/14/2022    ALKPHOS 62 07/14/2022    ALT 21 07/14/2022 "     Coags:   Lab Results   Component Value Date    INR 1.04 07/10/2022    PROTIME 13.4 07/10/2022    PTT 53.9 07/11/2022     FLP: No results found for: CHOL, HDL, LDLCALC, TRIG, CHOLHDL  DM:   Lab Results   Component Value Date    HGBA1C 5.1 07/10/2022    CREATININE 0.88 07/14/2022     Thyroid: No results found for: TSH, FREET4, H0QXJVF, N5MBREF, THYROIDAB  Anemia:   Lab Results   Component Value Date    IRON 13 (L) 07/11/2022    TIBC 227 (L) 07/11/2022    FERRITIN 223.12 (H) 07/11/2022    IVPCNFHH14 1,629 (H) 07/12/2022    FOLATE 16.5 07/12/2022     Cardiac:   Lab Results   Component Value Date    TROPONINI 0.663 (H) 07/12/2022     Urinalysis:   Lab Results   Component Value Date    LABURIN No Growth At 24 Hours 07/11/2022    PHUA 5.5 07/11/2022    UROBILINOGEN Normal 07/11/2022    WBCUA >100 (A) 07/11/2022       Imaging:    Current meds:    aspirin  81 mg Oral Daily    enoxaparin  40 mg Subcutaneous Daily    mupirocin   Nasal BID    pantoprazole  40 mg Intravenous BID    piperacillin-tazobactam (ZOSYN) IVPB  4.5 g Intravenous Q8H    sodium chloride 0.9%  10 mL Intravenous Q6H         ASSESSMENT & PLAN:     Partial SBO-improved  -management by general surgery team  -Plan for small bowel follow is normal   -Surgery to advance diet     NSTEMI Type II  -trending troponin x3 q6h, 0.190-->0.594-->0.740-->0.663, cardiology states this is demand ischemia  -EKG shows showed ST depressions in the inferior leads and V2, V3, V4, V5, V6, and AVL with no ST segment elevations  -Echocardiogram TTE shows EF 70%, hyperdynamic, mild MR, IVC with <50% inspiration collapse, Grade I DD   -Continue ASA 81mg daily  -No further episodes of chest pain    Leukocytosis  UTI  -UA shows leukesterase 25, , and bacterial, some signs of dysuria and pubic tenderness  -urine culture is pending, blood culture pending  -1 episode of fever at 100.4F on 7/11/22  -Currently on Zosyn 4.5g q8h (day 4), will de-escalate today   -blood culture  and urine culture show no growth at 24 hours    Hypokalemia   -Potassium 3.8 today, continue to monitor  -receiving 20 meq with fluids   -Continue daily Mag level    Normocytic Anemia   -H&H 8.3/25.1., MCV 96.5  -repleted iron with IV ferric gluconate   -borderline macrocytic, folate and Vit B12 are not decreased        DVT PPx: Lovenox 40mg  GI PPx: Protonix 40mg BID  Diet: NPO  ABX: Zosyn 4.5g (day 3)  Fluids: NS 75cc/h  O2: Room air  PCP: Primary Doctor No    Disposition (07/14/2022): Leukocytosis is resolved, cultures show no growth, SBFT is normal. Will deesclate antibiotics today and plan for discharge to nursing home or SNF.    Radames Loomis, DO   [facility] Internal Medicine, PGY-3

## 2022-07-14 NOTE — PROGRESS NOTES
Internal Medicine will sign off. Thank you for the consultation and please let us know if there are any further questions.    Radames Loomis, DO  [facility] IM PGY-3

## 2022-07-14 NOTE — MEDICAL/APP STUDENT
[facility] General Surgery     89 yo F with PMHx of PMH for CHF (unquantifed), CAD s/p CABG, HTN was transferred from an outside hospital to [facility] ED with small-bowel obstruction on 07/10/22.     AF over past 24h. Blood pressure ON of 180s/60s-70s. No complaints. Having bowel movements and flatus. Urine and blood Cx showing no growth at 24 hours. Denies nausea, vomiting, SOB, CP.     NAD  RRR  Non-labored breathing, INDIGO  S, ND, NT  Moves all extremities    WBC 10  Hgb 8.3  Cr 0.74  K+ 3.8      89 yo F with PMHx of PMH for CHF (unquantifed), CAD s/p CABG, HTN with small-bowel obstruction and Type 2 NSTEMI. Resolved.   - PT recommends discharge to SNF.     Nataliya Rice   [facility] General Surgery, MS4  07/14/2022 6:54 AM

## 2022-07-15 VITALS
WEIGHT: 174.19 LBS | TEMPERATURE: 99 F | BODY MASS INDEX: 29.02 KG/M2 | OXYGEN SATURATION: 98 % | RESPIRATION RATE: 20 BRPM | SYSTOLIC BLOOD PRESSURE: 168 MMHG | HEART RATE: 96 BPM | DIASTOLIC BLOOD PRESSURE: 64 MMHG | HEIGHT: 65 IN

## 2022-07-15 LAB
ALBUMIN SERPL-MCNC: 2.7 GM/DL (ref 3.4–4.8)
ALBUMIN/GLOB SERPL: 0.9 RATIO (ref 1.1–2)
ALP SERPL-CCNC: 69 UNIT/L (ref 40–150)
ALT SERPL-CCNC: 23 UNIT/L (ref 0–55)
AST SERPL-CCNC: 29 UNIT/L (ref 5–34)
BASOPHILS # BLD AUTO: 0.04 X10(3)/MCL (ref 0–0.2)
BASOPHILS NFR BLD AUTO: 0.6 %
BILIRUBIN DIRECT+TOT PNL SERPL-MCNC: 0.4 MG/DL
BUN SERPL-MCNC: 14.4 MG/DL (ref 9.8–20.1)
CALCIUM SERPL-MCNC: 9.3 MG/DL (ref 8.4–10.2)
CHLORIDE SERPL-SCNC: 104 MMOL/L (ref 98–111)
CO2 SERPL-SCNC: 27 MMOL/L (ref 23–31)
CREAT SERPL-MCNC: 0.76 MG/DL (ref 0.55–1.02)
EOSINOPHIL # BLD AUTO: 0.39 X10(3)/MCL (ref 0–0.9)
EOSINOPHIL NFR BLD AUTO: 6.2 %
ERYTHROCYTE [DISTWIDTH] IN BLOOD BY AUTOMATED COUNT: 13.3 % (ref 11.5–17)
GLOBULIN SER-MCNC: 3.1 GM/DL (ref 2.4–3.5)
GLUCOSE SERPL-MCNC: 95 MG/DL (ref 75–121)
HCT VFR BLD AUTO: 25.7 % (ref 37–47)
HGB BLD-MCNC: 8.4 GM/DL (ref 12–16)
IMM GRANULOCYTES # BLD AUTO: 0.04 X10(3)/MCL (ref 0–0.04)
IMM GRANULOCYTES NFR BLD AUTO: 0.6 %
LYMPHOCYTES # BLD AUTO: 1.26 X10(3)/MCL (ref 0.6–4.6)
LYMPHOCYTES NFR BLD AUTO: 19.9 %
MAGNESIUM SERPL-MCNC: 1.9 MG/DL (ref 1.6–2.6)
MCH RBC QN AUTO: 31.6 PG (ref 27–31)
MCHC RBC AUTO-ENTMCNC: 32.7 MG/DL (ref 33–36)
MCV RBC AUTO: 96.6 FL (ref 80–94)
MONOCYTES # BLD AUTO: 0.82 X10(3)/MCL (ref 0.1–1.3)
MONOCYTES NFR BLD AUTO: 13 %
NEUTROPHILS # BLD AUTO: 3.8 X10(3)/MCL (ref 2.1–9.2)
NEUTROPHILS NFR BLD AUTO: 59.7 %
NRBC BLD AUTO-RTO: 0 %
PLATELET # BLD AUTO: 250 X10(3)/MCL (ref 130–400)
PMV BLD AUTO: 9.5 FL (ref 7.4–10.4)
POTASSIUM SERPL-SCNC: 3.8 MMOL/L (ref 3.5–5.1)
PROT SERPL-MCNC: 5.8 GM/DL (ref 5.8–7.6)
RBC # BLD AUTO: 2.66 X10(6)/MCL (ref 4.2–5.4)
SODIUM SERPL-SCNC: 136 MMOL/L (ref 132–146)
WBC # SPEC AUTO: 6.3 X10(3)/MCL (ref 4.5–11.5)

## 2022-07-15 PROCEDURE — 94761 N-INVAS EAR/PLS OXIMETRY MLT: CPT

## 2022-07-15 PROCEDURE — 25000003 PHARM REV CODE 250: Performed by: STUDENT IN AN ORGANIZED HEALTH CARE EDUCATION/TRAINING PROGRAM

## 2022-07-15 PROCEDURE — 94760 N-INVAS EAR/PLS OXIMETRY 1: CPT

## 2022-07-15 PROCEDURE — 80053 COMPREHEN METABOLIC PANEL: CPT

## 2022-07-15 PROCEDURE — 83735 ASSAY OF MAGNESIUM: CPT

## 2022-07-15 PROCEDURE — 85025 COMPLETE CBC W/AUTO DIFF WBC: CPT

## 2022-07-15 PROCEDURE — 97535 SELF CARE MNGMENT TRAINING: CPT

## 2022-07-15 PROCEDURE — 36415 COLL VENOUS BLD VENIPUNCTURE: CPT

## 2022-07-15 PROCEDURE — C1751 CATH, INF, PER/CENT/MIDLINE: HCPCS

## 2022-07-15 PROCEDURE — 25000003 PHARM REV CODE 250

## 2022-07-15 PROCEDURE — A4216 STERILE WATER/SALINE, 10 ML: HCPCS

## 2022-07-15 RX ORDER — ASPIRIN 81 MG/1
81 TABLET ORAL DAILY
Qty: 360 TABLET | Refills: 0 | Status: SHIPPED | OUTPATIENT
Start: 2022-07-16 | End: 2023-01-01

## 2022-07-15 RX ADMIN — FUROSEMIDE 40 MG: 20 TABLET ORAL at 08:07

## 2022-07-15 RX ADMIN — SODIUM CHLORIDE, PRESERVATIVE FREE 10 ML: 5 INJECTION INTRAVENOUS at 05:07

## 2022-07-15 RX ADMIN — ASPIRIN 81 MG: 81 TABLET, COATED ORAL at 08:07

## 2022-07-15 RX ADMIN — PANTOPRAZOLE SODIUM 40 MG: 40 GRANULE, DELAYED RELEASE ORAL at 08:07

## 2022-07-15 RX ADMIN — LOSARTAN POTASSIUM 25 MG: 25 TABLET, FILM COATED ORAL at 08:07

## 2022-07-15 RX ADMIN — POTASSIUM CHLORIDE 20 MEQ: 1500 TABLET, EXTENDED RELEASE ORAL at 08:07

## 2022-07-15 RX ADMIN — MUPIROCIN: 20 OINTMENT TOPICAL at 09:07

## 2022-07-15 NOTE — PT/OT/SLP PROGRESS
Occupational Therapy   Treatment    Name: Zackery Purdy  MRN: [id number]  Admitting Diagnosis:  SBO (small bowel obstruction)    Patient Active Problem List   Diagnosis    Normocytic anemia    Coronary artery disease involving coronary bypass graft of native heart         Recommendations:     Discharge Recommendations: nursing facility, skilled  Discharge Equipment Recommendations:  none  Barriers to discharge:  None    Assessment:     Zackery Purdy is a 89 y.o. female with a medical diagnosis of SBO (small bowel obstruction),   Patient Active Problem List   Diagnosis    Normocytic anemia    Coronary artery disease involving coronary bypass graft of native heart     .  She presents with functional decline. Performance deficits affecting function are weakness, impaired endurance, impaired functional mobilty, impaired balance, impaired self care skills.     Rehab Prognosis:  Good; patient would benefit from acute skilled OT services to address these deficits and reach maximum level of function.       Plan:     Patient to be seen  (2-5 times per week, M-F) to address the above listed problems via self-care/home management, therapeutic activities, therapeutic exercises  · Plan of Care Expires:  (D/C)  · Plan of Care Reviewed with: patient    Subjective     Pain/Comfort:  · Pain Rating 1: 0/10    Objective:     Communicated with: nurse Camarena prior to session.  Patient found seated on BSC next to bed with telemetry, peripheral IV upon OT entry to room.    General Precautions: Standard, fall   Orthopedic Precautions:N/A   Braces:    Respiratory Status: Room air     Occupational Performance:     Functional Mobility/Transfers:  · Patient completed Sit <> Stand Transfer with stand by assistance  with  rolling walker   · Patient completed Toilet Transfer Stand Pivot technique with stand by assistance with  rolling walker and drop arm commode    Activities of Daily Living:  · Toileting: minimum assistance use of RW and  [facility]      Treatment & Education:  Toileting performed twice during session.    Patient left supine with all lines intact, call button in reach, nurse Nicol notified and BLE elevated on pillows for edema control.  OT instructed pt to perform ankle pumps secondary to edema.  Pt voiced understanding.Education:      GOALS:   Multidisciplinary Problems     Occupational Therapy Goals        Problem: Occupational Therapy    Goal Priority Disciplines Outcome Interventions   Occupational Therapy Goal     OT, PT/OT Ongoing, Progressing    Description: Goals to be met by: d/c    Patient will increase functional independence with ADLs by performing:    LE Dressing with Modified [place].    Grooming while standing with Modified [place].    Rolling to Bilateral with [place].     Supine to sit with [place].    Stand pivot transfers with Modified [place].                     Time Tracking:     OT Date of Treatment: 07/15/22  OT Start Time: 0852  OT Stop Time: 0932  OT Total Time (min): 40 min    Billable Minutes:Self Care/Home Management 40    OT/ISAEL: OT          7/15/2022

## 2022-07-15 NOTE — PT/OT/SLP DISCHARGE
Physical Therapy Discharge Summary-post discharge documentation    Name: Zackery Purdy  MRN: [id number]   Principal Problem: SBO (small bowel obstruction)     Patient Discharged from acute Physical Therapy on 7/15/22.  Please refer to prior PT noted date on 7/13/22 for functional status.     Assessment:     Patient was discharged unexpectedly.   Refer to therapy initial evaluation and last progress note for initial and most recent functional status and goal achievement.  Recommendations made may be found in medical record.    Objective:     GOALS:   Multidisciplinary Problems     Physical Therapy Goals        Problem: Physical Therapy    Goal Priority Disciplines Outcome Goal Variances Interventions   Physical Therapy Goal     PT, PT/OT Unable to Meet,      Description: Goals to be met by: DISCHARGE     Patient will increase functional independence with mobility by performing:    -. Supine to sit with Stand-by Assistance - ONGOING  -. Sit to supine with Stand-by Assistance - ONGOING  -. Rolling to Left and Right with Modified [place] - ONGOING  -. Sit to stand transfer with Supervision - ONGOING  -. Gait  x 65ft x2 with Supervision using Rolling Walker - ONGOING  -. Ascend/descend 3 stair with bilateral Handrails Supervision using No Assistive Device - ONGOING                     Reasons for Discontinuation of Therapy Services  Transfer to alternate level of care.      Plan:     Patient Discharged to: Skilled Nursing Facility.      7/15/2022

## 2022-07-15 NOTE — PROGRESS NOTES
[facility] General Surgery     Subjective  NAEON. AF, HDS over past 24h.  Having bowel function, 3x BM recorded  Voiding without dysuria. Did have to get up 2x overnight        NAD  RRR  Non-labored breathing, INDIGO  S, ND, NT  Moves all extremities  Bilateral lower extremity pitting edema    WBC 6.3 (10)  Urine cx final NGTD      89 yo F presenting with partial SBO and NSTEMI. Obstruction resolved and leukocytosis resolved off abx     - Regular diet  - Lower extremity edema persists, restarting home dose lasix, elevate lower extremities  - Pt/OT  - SNF placement       Dilcia Sebastian MD   [facility] General Surgery, PGY3  07/15/2022 6:31 AM

## 2022-07-15 NOTE — MEDICAL/APP STUDENT
[facility] General Surgery - Purple Team  Daily Progress Note    Patient ID: Zackery Purdy    Subjective:  NAEON, afebrile, hypertensive up to 191/66, controlled with hydralazine.   Pt is feeling well this morning. She had one loose BM yesterday morning and hasn't had another one since. She is passing gas and urinating without dysuria. She is tolerating solids well without N/V.    Objective:    Vitals:  Vitals:    07/15/22 0431   BP: (!) 142/47   Pulse: 99   Resp:    Temp: 98.2 °F (36.8 °C)          Physical Exam:  Gen: NAD  Neuro: awake, alert, answering questions appropriately  CV: RRR  Resp: non-labored breathing, INDIGO  Abd: soft, ND, NT  Ext: moves all 4 spontaneously and purposefully  Skin: warm, well perfused      Labs:  WBC 6.3  Cr 0.76      Micro/Path/Other:  Urine culture - no growth    Assessment/Plan:  89 yo F presenting with partial SBO and NSTEMI. Obstruction resolved    - Continue regular diet  - SNF placement      Mitra Ellis   [facility] General Surgery, PGY4  07/15/2022 6:14 AM

## 2022-07-15 NOTE — DISCHARGE SUMMARY
[facility]  General Surgery  Discharge Summary      Patient Name: Zackery Purdy  MRN: [id number]  Admission Date: 7/10/2022  Hospital Length of Stay: 5 days  Discharge Date and Time:  07/15/2022 9:36 AM  Attending Physician: Baltazar Ng Jr., *   Discharging Provider: Hanna Sebastian MD  Primary Care Provider: Primary Doctor No     HPI:   89F transferred from OSH presenting w/ one day history of weakness, vomiting, epigastric abdominal pain, inability to keep solids/ liquids down. States that she had 2 solid BMs yesterday, and is presently still passing flatulence. Normally takes miralax and has daily bowel movements. OSH inserted NG tube, placed pt on maintenance fluids of 150mL/hr, administered 4 baby aspirin, and administered zofran for nausea. She states that she is ambulatory w/ RW and lives at home with brother. At this time, she is in no pain and denies fever/nausea/vomitting. Her last meal was 7/9/22.    Hospital Course:   Patient was admitted for partial small bowel obstruction with NGT in place and medicine consulted for NSTEMI. Troponins continued to rise with EKG changes. Cardiology evaluated but felt there was low concern for ACS. At this time the patient also had a rising leukocytosis with general malaise prompting sepsis work up and initiation of antibiotics for presumed UTI (dirty UA). Final cultures were negative and her white count remained stable off antibiotics. She was passing flatus for duration of her stay and passed SBFT after cardiac issues addressed. Her diet was gradually advanced. At discharge, she was tolerating regular diet with reliable bowel function, denied dysuria and chest pain. She will be discharged to SNF per PT/OT recommendations.    Consults:   Consults (From admission, onward)        Status Ordering Provider     Inpatient consult to Social Work/Case Management  Once        Provider:  (Not yet assigned)    Completed HANNA SEBASTIAN      Inpatient consult to Social Work/Case Management  Once        Provider:  (Not yet assigned)    Completed HANNA CAGLE     Inpatient consult to Cardiology  Once        Provider:  Cleo Costello MD    Completed TRISTIN FERUGSON     Inpatient consult to Cardiology  Once        Provider:  (Not yet assigned)    Completed ARIELLA VIDES     Inpatient consult to Hospital Medicine  Once        Provider:  Ying Marquez MD    Acknowledged ARIELLA VIDES     Inpatient consult to Hospitalist  Once        Provider:  (Not yet assigned)    Acknowledged ARIELLA VIDES     Inpatient consult to General surgery  Once        Provider:  (Not yet assigned)    Completed ALANIS MARTINS          Significant Diagnostic Studies:   XR SMALL BOWEL FOLLOW THROUGH     CLINICAL HISTORY:  SBO;     TECHNIQUE:  Sequential KUB radiographs of the abdomen were obtained following administration of Gastrografin     COMPARISON:  Outside CT abdomen pelvis dated 07/09/2022     FINDINGS:   radiograph demonstrates nasogastric tube with tip over the stomach.  Contrast progresses normally through the small bowel and reaches the colon by 2 hours.     Impression:     Normal small bowel transit time.      TTE  · The left ventricle is normal in size with normal systolic function.  · The estimated ejection fraction is 70%.  · Normal right ventricular size.  · Mild mitral regurgitation.  · Intermediate central venous pressure (8 mmHg).  · IVC is normal size and collapse < 50% with inspiration.  · Grade I left ventricular diastolic dysfunction.      Final Active Diagnoses:    Diagnosis Date Noted POA    Normocytic anemia [D64.9]  Yes    Coronary artery disease involving coronary bypass graft of native heart [I25.810]  Yes      Problems Resolved During this Admission:    Diagnosis Date Noted Date Resolved POA    PRINCIPAL PROBLEM:  SBO (small bowel obstruction) [K56.609]  07/15/2022 Yes    NSTEMI (non-ST elevated myocardial infarction)  [I21.4]  07/15/2022 Yes    Hyponatremia [E87.1]  07/15/2022 Yes    Elevated troponin [R77.8]  07/15/2022 Yes    Tachyarrhythmia [R00.0]  07/15/2022 Yes    Hypokalemia [E87.6]  07/15/2022 Yes      Discharged Condition: good    Disposition: Skilled Nursing Facility    Follow Up:   Follow-up Information     [facility] Follow up.    Specialty: General Surgery  Why: As needed  Contact information:  [address] [facility] [address] [phone]                     Patient Instructions:      Diet Cardiac     Notify your health care provider if you experience any of the following:  persistent nausea and vomiting or diarrhea     Notify your health care provider if you experience any of the following:  increased confusion or weakness     Notify your health care provider if you experience any of the following:  temperature >100.4     Activity as tolerated     Medications:  Reconciled Home Medications:      Medication List      START taking these medications    aspirin 81 MG EC tablet  Commonly known as: ECOTRIN  Take 1 tablet (81 mg total) by mouth once daily.  Start taking on: July 16, 2022        CONTINUE taking these medications    fexofenadine-pseudoephedrine 180-240 mg per 24 hr tablet  Commonly known as: ALLEGRA-D 24  Take 1 tablet by mouth once daily.     furosemide 40 MG tablet  Commonly known as: LASIX  Take 40 mg by mouth once daily.     losartan 25 MG tablet  Commonly known as: COZAAR  Take 25 mg by mouth once daily.     minoxidiL 10 MG Tab  Commonly known as: LONITEN  Take 2.5 mg by mouth once daily.     simvastatin 40 MG tablet  Commonly known as: ZOCOR  Take 40 mg by mouth every evening.            Dilcia Sebastian MD  General Surgery  [facility]

## 2022-07-15 NOTE — MEDICAL/APP STUDENT
U General Surgery     89 yo F with PMHx of PMH for CHF (unquantifed), CAD s/p CABG, HTN was transferred from an outside hospital to [facility] ED with small-bowel obstruction on 07/10/22 with subsequent rising troponins concerning for NSTEMI.     AF over past 24h. Hypertensive ON to 190/66. Hydralyzine 10 was given which reduced BP to 147/62. Tolerating regular diet with no nausea or vomiting *** Urine cx showed no growth and blood Cx showing no growth at 48 hours. Denies fever, chills, nausea, vomiting, CP, SOB.      NAD  RRR  Non-labored breathing, INDIGO  S, ND, NT  Moves all extremities    WBC 10  Hgb 8.3  Cr 0.74  K+ 3.8      89 yo F with PMHx of PMH for CHF (unquantifed), CAD s/p CABG, HTN with small-bowel obstruction and Type 2 NSTEMI. Resolved.     - PT recommends discharge to SNF.     Nataliya Rice   [facility] General Surgery, MS4  07/15/2022 5:12 AM

## 2022-07-15 NOTE — PT/OT/SLP PROGRESS
Physical Therapy  Missed Treatment Note    Patient Name:  Zackery Purdy   MRN:  [id number]    Patient not seen today secondary to Patient unwilling to participate. Will follow-up as scheduled pending discharge.

## 2022-07-15 NOTE — PROGRESS NOTES
Pt accepted for SNF placement at [facility] under the care of Dr. Andrew Berger. Report to be called to Yoselin at [phone]. Pt will transport via facility van. Discussed discharge plan with pt; Mela washington; & sister, Kassy Cordero, who were all in agreement.

## 2022-07-15 NOTE — PT/OT/SLP DISCHARGE
Occupational Therapy Discharge Summary    Zackery Purdy  MRN: [id number]   Principal Problem: SBO (small bowel obstruction)      Patient Discharged from acute Occupational Therapy.  Please refer to prior OT note for functional status.    Assessment:      Patient has not met goals.    Objective:     GOALS:   Multidisciplinary Problems     Occupational Therapy Goals        Problem: Occupational Therapy    Goal Priority Disciplines Outcome Interventions   Occupational Therapy Goal     OT, PT/OT Ongoing, Progressing    Description: Goals to be met by: d/c    Patient will increase functional independence with ADLs by performing:    LE Dressing with Modified [place].    Grooming while standing with Modified [place].    Rolling to Bilateral with [place].     Supine to sit with [place].    Stand pivot transfers with Modified [place].                     Reasons for Discontinuation of Therapy Services  Transfer to alternate level of care.      Plan:     Patient Discharged to: Skilled Nursing Facility    7/15/2022

## 2022-07-17 LAB — BACTERIA BLD CULT: NORMAL

## 2022-07-18 NOTE — PT/OT/SLP DISCHARGE
Occupational Therapy Discharge Summary    Zackery Purdy  MRN: [id number]   Principal Problem: SBO (small bowel obstruction)      Patient Discharged from acute Occupational Therapy.  Please refer to prior OT note for functional status.    Assessment:      Patient has not met goals.    Objective:     GOALS:   Multidisciplinary Problems     Occupational Therapy Goals        Problem: Occupational Therapy    Goal Priority Disciplines Outcome Interventions   Occupational Therapy Goal     OT, PT/OT Ongoing, Progressing    Description: Goals to be met by: d/c    Patient will increase functional independence with ADLs by performing:    LE Dressing with Modified [place].    Grooming while standing with Modified [place].    Rolling to Bilateral with [place].     Supine to sit with [place].    Stand pivot transfers with Modified [place].                     Reasons for Discontinuation of Therapy Services  Transfer to alternate level of care.      Plan:     Patient Discharged to: Skilled Nursing Facility    7/18/2022

## 2023-01-01 ENCOUNTER — LAB REQUISITION (OUTPATIENT)
Dept: LAB | Facility: HOSPITAL | Age: 88
End: 2023-01-01
Payer: MEDICARE

## 2023-01-01 ENCOUNTER — PATIENT OUTREACH (OUTPATIENT)
Dept: ADMINISTRATIVE | Facility: CLINIC | Age: 88
End: 2023-01-01
Payer: MEDICARE

## 2023-01-01 ENCOUNTER — ANESTHESIA (OUTPATIENT)
Dept: SURGERY | Facility: HOSPITAL | Age: 88
DRG: 853 | End: 2023-01-01
Payer: MEDICARE

## 2023-01-01 ENCOUNTER — HOSPITAL ENCOUNTER (INPATIENT)
Facility: HOSPITAL | Age: 88
LOS: 1 days | Discharge: HOSPICE | DRG: 872 | End: 2023-11-03
Attending: FAMILY MEDICINE | Admitting: FAMILY MEDICINE
Payer: MEDICARE

## 2023-01-01 ENCOUNTER — HOSPITAL ENCOUNTER (INPATIENT)
Facility: HOSPITAL | Age: 88
LOS: 6 days | Discharge: NURSING, REHAB OR LONG-TERM CARE | DRG: 853 | End: 2023-10-12
Admitting: FAMILY MEDICINE
Payer: MEDICARE

## 2023-01-01 ENCOUNTER — HOSPITAL ENCOUNTER (OUTPATIENT)
Dept: RADIOLOGY | Facility: HOSPITAL | Age: 88
Discharge: HOME | End: 2023-10-25
Attending: NURSE PRACTITIONER
Payer: MEDICARE

## 2023-01-01 ENCOUNTER — ANESTHESIA EVENT (OUTPATIENT)
Dept: SURGERY | Facility: HOSPITAL | Age: 88
DRG: 853 | End: 2023-01-01
Payer: MEDICARE

## 2023-01-01 ENCOUNTER — APPOINTMENT (OUTPATIENT)
Dept: LAB | Facility: HOSPITAL | Age: 88
End: 2023-01-01
Attending: INTERNAL MEDICINE
Payer: MEDICARE

## 2023-01-01 ENCOUNTER — HOSPITAL ENCOUNTER (INPATIENT)
Facility: HOSPITAL | Age: 88
LOS: 1 days | DRG: 951 | End: 2023-11-04
Attending: FAMILY MEDICINE | Admitting: FAMILY MEDICINE
Payer: OTHER MISCELLANEOUS

## 2023-01-01 VITALS
TEMPERATURE: 98 F | WEIGHT: 118.19 LBS | SYSTOLIC BLOOD PRESSURE: 127 MMHG | RESPIRATION RATE: 13 BRPM | DIASTOLIC BLOOD PRESSURE: 79 MMHG | OXYGEN SATURATION: 100 % | HEIGHT: 66 IN | BODY MASS INDEX: 18.99 KG/M2 | HEART RATE: 93 BPM

## 2023-01-01 VITALS
SYSTOLIC BLOOD PRESSURE: 77 MMHG | HEART RATE: 97 BPM | RESPIRATION RATE: 10 BRPM | TEMPERATURE: 99 F | OXYGEN SATURATION: 94 % | DIASTOLIC BLOOD PRESSURE: 39 MMHG | WEIGHT: 127.19 LBS | HEIGHT: 66 IN | BODY MASS INDEX: 20.44 KG/M2

## 2023-01-01 VITALS
HEIGHT: 66 IN | DIASTOLIC BLOOD PRESSURE: 42 MMHG | SYSTOLIC BLOOD PRESSURE: 67 MMHG | WEIGHT: 130.06 LBS | BODY MASS INDEX: 20.9 KG/M2 | OXYGEN SATURATION: 92 % | TEMPERATURE: 97 F

## 2023-01-01 DIAGNOSIS — D51.9 VITAMIN B12 DEFICIENCY ANEMIA, UNSPECIFIED: ICD-10-CM

## 2023-01-01 DIAGNOSIS — R68.89 OTHER GENERAL SYMPTOMS AND SIGNS: ICD-10-CM

## 2023-01-01 DIAGNOSIS — A41.9 SEPTIC SHOCK: Primary | ICD-10-CM

## 2023-01-01 DIAGNOSIS — A41.9 SEPSIS: ICD-10-CM

## 2023-01-01 DIAGNOSIS — R07.9 CHEST PAIN: ICD-10-CM

## 2023-01-01 DIAGNOSIS — R65.20 SEVERE SEPSIS: ICD-10-CM

## 2023-01-01 DIAGNOSIS — E55.9 VITAMIN D DEFICIENCY, UNSPECIFIED: ICD-10-CM

## 2023-01-01 DIAGNOSIS — R19.7 DIARRHEA, UNSPECIFIED TYPE: ICD-10-CM

## 2023-01-01 DIAGNOSIS — E03.9 HYPOTHYROIDISM, UNSPECIFIED: ICD-10-CM

## 2023-01-01 DIAGNOSIS — N30.00 ACUTE CYSTITIS WITHOUT HEMATURIA: ICD-10-CM

## 2023-01-01 DIAGNOSIS — R65.21 SEPTIC SHOCK: Primary | ICD-10-CM

## 2023-01-01 DIAGNOSIS — A41.9 SEVERE SEPSIS: ICD-10-CM

## 2023-01-01 DIAGNOSIS — A41.9 SEPSIS WITH ACUTE RENAL FAILURE WITHOUT SEPTIC SHOCK, DUE TO UNSPECIFIED ORGANISM, UNSPECIFIED ACUTE RENAL FAILURE TYPE: ICD-10-CM

## 2023-01-01 DIAGNOSIS — N17.9 SEPSIS WITH ACUTE RENAL FAILURE WITHOUT SEPTIC SHOCK, DUE TO UNSPECIFIED ORGANISM, UNSPECIFIED ACUTE RENAL FAILURE TYPE: ICD-10-CM

## 2023-01-01 DIAGNOSIS — R05.9 COUGH: ICD-10-CM

## 2023-01-01 DIAGNOSIS — E86.1 HYPOTENSION DUE TO HYPOVOLEMIA: ICD-10-CM

## 2023-01-01 DIAGNOSIS — N17.9 AKI (ACUTE KIDNEY INJURY): ICD-10-CM

## 2023-01-01 DIAGNOSIS — E78.5 HYPERLIPIDEMIA, UNSPECIFIED: ICD-10-CM

## 2023-01-01 DIAGNOSIS — E61.2 MAGNESIUM DEFICIENCY: ICD-10-CM

## 2023-01-01 DIAGNOSIS — E86.0 DEHYDRATION: Primary | ICD-10-CM

## 2023-01-01 DIAGNOSIS — R65.20 SEPSIS WITH ACUTE RENAL FAILURE WITHOUT SEPTIC SHOCK, DUE TO UNSPECIFIED ORGANISM, UNSPECIFIED ACUTE RENAL FAILURE TYPE: ICD-10-CM

## 2023-01-01 DIAGNOSIS — R79.89 OTHER SPECIFIED ABNORMAL FINDINGS OF BLOOD CHEMISTRY: ICD-10-CM

## 2023-01-01 DIAGNOSIS — N17.9 ACUTE KIDNEY INJURY: ICD-10-CM

## 2023-01-01 DIAGNOSIS — E86.0 DEHYDRATION: ICD-10-CM

## 2023-01-01 DIAGNOSIS — R00.1 BRADYCARDIA: ICD-10-CM

## 2023-01-01 DIAGNOSIS — K57.92 DIVERTICULITIS: ICD-10-CM

## 2023-01-01 DIAGNOSIS — D52.9 FOLATE DEFICIENCY ANEMIA, UNSPECIFIED: ICD-10-CM

## 2023-01-01 DIAGNOSIS — L89.619 PRESSURE INJURY OF SKIN OF RIGHT HEEL, UNSPECIFIED INJURY STAGE: ICD-10-CM

## 2023-01-01 DIAGNOSIS — K52.9 GASTROENTERITIS: ICD-10-CM

## 2023-01-01 DIAGNOSIS — M79.606 LEG PAIN: ICD-10-CM

## 2023-01-01 LAB
ABO + RH BLD: NORMAL
ABO AND RH: NORMAL
ABORH RETYPE: NORMAL
ABS NEUT CALC (OHS): 13.01 X10(3)/MCL (ref 2.1–9.2)
ACANTHOCYTES (OLG): ABNORMAL
ADV 40+41 DNA STL QL NAA+NON-PROBE: NOT DETECTED
ALBUMIN SERPL-MCNC: 2.2 G/DL (ref 3.4–5)
ALBUMIN SERPL-MCNC: 2.3 G/DL (ref 3.4–5)
ALBUMIN SERPL-MCNC: 2.3 G/DL (ref 3.4–5)
ALBUMIN SERPL-MCNC: 2.4 G/DL (ref 3.4–5)
ALBUMIN SERPL-MCNC: 2.4 G/DL (ref 3.4–5)
ALBUMIN SERPL-MCNC: 2.5 G/DL (ref 3.4–5)
ALBUMIN SERPL-MCNC: 2.5 G/DL (ref 3.4–5)
ALBUMIN SERPL-MCNC: 2.6 G/DL (ref 3.4–5)
ALBUMIN SERPL-MCNC: 3.5 G/DL (ref 3.4–5)
ALBUMIN/GLOB SERPL: 0.9 RATIO
ALBUMIN/GLOB SERPL: 1 RATIO
ALP SERPL-CCNC: 121 UNIT/L (ref 50–144)
ALP SERPL-CCNC: 136 UNIT/L (ref 50–144)
ALP SERPL-CCNC: 143 UNIT/L (ref 50–144)
ALP SERPL-CCNC: 146 UNIT/L (ref 50–144)
ALP SERPL-CCNC: 154 UNIT/L (ref 50–144)
ALP SERPL-CCNC: 159 UNIT/L (ref 50–144)
ALP SERPL-CCNC: 170 UNIT/L (ref 50–144)
ALP SERPL-CCNC: 221 UNIT/L (ref 50–144)
ALP SERPL-CCNC: 267 UNIT/L (ref 50–144)
ALT SERPL-CCNC: 35 UNIT/L (ref 1–45)
ALT SERPL-CCNC: 37 UNIT/L (ref 1–45)
ALT SERPL-CCNC: 37 UNIT/L (ref 1–45)
ALT SERPL-CCNC: 40 UNIT/L (ref 1–45)
ALT SERPL-CCNC: 41 UNIT/L (ref 1–45)
ALT SERPL-CCNC: 41 UNIT/L (ref 1–45)
ALT SERPL-CCNC: 45 UNIT/L (ref 1–45)
ALT SERPL-CCNC: 66 UNIT/L (ref 1–45)
ALT SERPL-CCNC: 69 UNIT/L (ref 1–45)
ANION GAP SERPL CALC-SCNC: 11 MEQ/L (ref 2–13)
ANION GAP SERPL CALC-SCNC: 12 MEQ/L (ref 2–13)
ANION GAP SERPL CALC-SCNC: 13 MEQ/L (ref 2–13)
ANION GAP SERPL CALC-SCNC: 13 MEQ/L (ref 2–13)
ANION GAP SERPL CALC-SCNC: 17 MEQ/L (ref 2–13)
ANION GAP SERPL CALC-SCNC: 18 MEQ/L (ref 2–13)
ANION GAP SERPL CALC-SCNC: 5 MEQ/L (ref 2–13)
ANION GAP SERPL CALC-SCNC: 5 MEQ/L (ref 2–13)
ANION GAP SERPL CALC-SCNC: 6 MEQ/L (ref 2–13)
ANION GAP SERPL CALC-SCNC: 6 MEQ/L (ref 2–13)
ANION GAP SERPL CALC-SCNC: 8 MEQ/L (ref 2–13)
ANION GAP SERPL CALC-SCNC: 9 MEQ/L (ref 2–13)
ANISOCYTOSIS BLD QL SMEAR: ABNORMAL
ANTIBODY SCREEN: NORMAL
APPEARANCE UR: ABNORMAL
APPEARANCE UR: CLEAR
AST SERPL-CCNC: 107 UNIT/L (ref 14–36)
AST SERPL-CCNC: 32 UNIT/L (ref 14–36)
AST SERPL-CCNC: 34 UNIT/L (ref 14–36)
AST SERPL-CCNC: 42 UNIT/L (ref 14–36)
AST SERPL-CCNC: 44 UNIT/L (ref 14–36)
AST SERPL-CCNC: 45 UNIT/L (ref 14–36)
AST SERPL-CCNC: 46 UNIT/L (ref 14–36)
AST SERPL-CCNC: 50 UNIT/L (ref 14–36)
AST SERPL-CCNC: 63 UNIT/L (ref 14–36)
ASTRO TYP 1-8 RNA STL QL NAA+NON-PROBE: NOT DETECTED
BACTERIA #/AREA URNS AUTO: ABNORMAL /HPF
BACTERIA #/AREA URNS AUTO: ABNORMAL /HPF
BACTERIA BLD CULT: NORMAL
BACTERIA BLD CULT: NORMAL
BACTERIA UR CULT: ABNORMAL
BACTERIA UR CULT: ABNORMAL
BASOPHILS # BLD AUTO: 0.01 X10(3)/MCL (ref 0.01–0.08)
BASOPHILS # BLD AUTO: 0.02 X10(3)/MCL (ref 0.01–0.08)
BASOPHILS # BLD AUTO: 0.03 X10(3)/MCL (ref 0.01–0.08)
BASOPHILS # BLD AUTO: 0.04 X10(3)/MCL (ref 0.01–0.08)
BASOPHILS # BLD AUTO: 0.04 X10(3)/MCL (ref 0.01–0.08)
BASOPHILS NFR BLD AUTO: 0.1 % (ref 0.1–1.2)
BASOPHILS NFR BLD AUTO: 0.2 % (ref 0.1–1.2)
BASOPHILS NFR BLD AUTO: 0.4 % (ref 0.1–1.2)
BASOPHILS NFR BLD AUTO: 0.4 % (ref 0.1–1.2)
BILIRUB SERPL-MCNC: 0.5 MG/DL (ref 0–1)
BILIRUB SERPL-MCNC: 0.5 MG/DL (ref 0–1)
BILIRUB SERPL-MCNC: 0.6 MG/DL (ref 0–1)
BILIRUB SERPL-MCNC: 0.7 MG/DL (ref 0–1)
BILIRUB SERPL-MCNC: 0.8 MG/DL (ref 0–1)
BILIRUB UR QL STRIP.AUTO: NEGATIVE
BILIRUB UR QL STRIP.AUTO: NEGATIVE
BLD PROD TYP BPU: NORMAL
BLOOD UNIT EXPIRATION DATE: NORMAL
BLOOD UNIT TYPE CODE: 5100
BNP BLD-MCNC: 1760 PG/ML (ref 0–124.9)
BUN SERPL-MCNC: 18 MG/DL (ref 7–20)
BUN SERPL-MCNC: 22 MG/DL (ref 7–20)
BUN SERPL-MCNC: 25 MG/DL (ref 7–20)
BUN SERPL-MCNC: 32 MG/DL (ref 7–20)
BUN SERPL-MCNC: 47 MG/DL (ref 7–20)
BUN SERPL-MCNC: 48 MG/DL (ref 7–20)
BUN SERPL-MCNC: 57 MG/DL (ref 7–20)
BUN SERPL-MCNC: 65 MG/DL (ref 7–20)
BUN SERPL-MCNC: 74 MG/DL (ref 7–20)
BUN SERPL-MCNC: 83 MG/DL (ref 7–20)
BUN SERPL-MCNC: 89 MG/DL (ref 7–20)
BUN SERPL-MCNC: 98 MG/DL (ref 7–20)
C CAYETANENSIS DNA STL QL NAA+NON-PROBE: NOT DETECTED
C COLI+JEJ+UPSA DNA STL QL NAA+NON-PROBE: NOT DETECTED
C DIFF TOX GENS STL QL NAA+PROBE: NOT DETECTED
CALCIUM SERPL-MCNC: 8.1 MG/DL (ref 8.4–10.2)
CALCIUM SERPL-MCNC: 8.6 MG/DL (ref 8.4–10.2)
CALCIUM SERPL-MCNC: 8.6 MG/DL (ref 8.4–10.2)
CALCIUM SERPL-MCNC: 8.7 MG/DL (ref 8.4–10.2)
CALCIUM SERPL-MCNC: 8.7 MG/DL (ref 8.4–10.2)
CALCIUM SERPL-MCNC: 8.8 MG/DL (ref 8.4–10.2)
CALCIUM SERPL-MCNC: 8.9 MG/DL (ref 8.4–10.2)
CALCIUM SERPL-MCNC: 9 MG/DL (ref 8.4–10.2)
CALCIUM SERPL-MCNC: 9.3 MG/DL (ref 8.4–10.2)
CALCIUM SERPL-MCNC: 9.8 MG/DL (ref 8.4–10.2)
CHLORIDE SERPL-SCNC: 103 MMOL/L (ref 98–110)
CHLORIDE SERPL-SCNC: 106 MMOL/L (ref 98–110)
CHLORIDE SERPL-SCNC: 106 MMOL/L (ref 98–110)
CHLORIDE SERPL-SCNC: 107 MMOL/L (ref 98–110)
CHLORIDE SERPL-SCNC: 107 MMOL/L (ref 98–110)
CHLORIDE SERPL-SCNC: 110 MMOL/L (ref 98–110)
CHLORIDE SERPL-SCNC: 113 MMOL/L (ref 98–110)
CHLORIDE SERPL-SCNC: 122 MMOL/L (ref 98–110)
CHLORIDE SERPL-SCNC: 123 MMOL/L (ref 98–110)
CHLORIDE SERPL-SCNC: 126 MMOL/L (ref 98–110)
CHLORIDE SERPL-SCNC: 128 MMOL/L (ref 98–110)
CHLORIDE SERPL-SCNC: 129 MMOL/L (ref 98–110)
CHOLEST SERPL-MCNC: 74 MG/DL (ref 0–200)
CK SERPL-CCNC: 122 U/L (ref 30–135)
CO2 SERPL-SCNC: 14 MMOL/L (ref 21–32)
CO2 SERPL-SCNC: 14 MMOL/L (ref 21–32)
CO2 SERPL-SCNC: 15 MMOL/L (ref 21–32)
CO2 SERPL-SCNC: 15 MMOL/L (ref 21–32)
CO2 SERPL-SCNC: 16 MMOL/L (ref 21–32)
CO2 SERPL-SCNC: 16 MMOL/L (ref 21–32)
CO2 SERPL-SCNC: 17 MMOL/L (ref 21–32)
CO2 SERPL-SCNC: 17 MMOL/L (ref 21–32)
CO2 SERPL-SCNC: 18 MMOL/L (ref 21–32)
CO2 SERPL-SCNC: 19 MMOL/L (ref 21–32)
CO2 SERPL-SCNC: 24 MMOL/L (ref 21–32)
CO2 SERPL-SCNC: 26 MMOL/L (ref 21–32)
COLOR STL: NORMAL
COLOR UR AUTO: YELLOW
COLOR UR AUTO: YELLOW
CONSISTENCY STL: NORMAL
CONSISTENCY STL: NORMAL
CREAT SERPL-MCNC: 0.8 MG/DL (ref 0.66–1.25)
CREAT SERPL-MCNC: 0.96 MG/DL (ref 0.66–1.25)
CREAT SERPL-MCNC: 1.01 MG/DL (ref 0.66–1.25)
CREAT SERPL-MCNC: 1.17 MG/DL (ref 0.66–1.25)
CREAT SERPL-MCNC: 1.26 MG/DL (ref 0.66–1.25)
CREAT SERPL-MCNC: 1.36 MG/DL (ref 0.66–1.25)
CREAT SERPL-MCNC: 1.62 MG/DL (ref 0.66–1.25)
CREAT SERPL-MCNC: 2.12 MG/DL (ref 0.66–1.25)
CREAT SERPL-MCNC: 2.16 MG/DL (ref 0.66–1.25)
CREAT SERPL-MCNC: 2.2 MG/DL (ref 0.66–1.25)
CREAT SERPL-MCNC: 2.23 MG/DL (ref 0.66–1.25)
CREAT SERPL-MCNC: 2.24 MG/DL (ref 0.66–1.25)
CREAT/UREA NIT SERPL: 21 (ref 12–20)
CREAT/UREA NIT SERPL: 23 (ref 12–20)
CREAT/UREA NIT SERPL: 23 (ref 12–20)
CREAT/UREA NIT SERPL: 25 (ref 12–20)
CREAT/UREA NIT SERPL: 29 (ref 12–20)
CREAT/UREA NIT SERPL: 31 (ref 12–20)
CREAT/UREA NIT SERPL: 34 (ref 12–20)
CREAT/UREA NIT SERPL: 38 (ref 12–20)
CREAT/UREA NIT SERPL: 40 (ref 12–20)
CREAT/UREA NIT SERPL: 42 (ref 12–20)
CREAT/UREA NIT SERPL: 44 (ref 12–20)
CREAT/UREA NIT SERPL: 48 (ref 12–20)
CROSSMATCH INTERPRETATION: NORMAL
CRYPTOSP DNA STL QL NAA+NON-PROBE: NOT DETECTED
DEPRECATED CALCIDIOL+CALCIFEROL SERPL-MC: 59.3 NG/ML (ref 30–100)
DISPENSE STATUS: NORMAL
E COLI O157 DNA STL QL NAA+NON-PROBE: NORMAL
E HISTOLYT DNA STL QL NAA+NON-PROBE: NOT DETECTED
EAEC PAA PLAS AGGR+AATA ST NAA+NON-PRB: NOT DETECTED
EC STX1+STX2 GENES STL QL NAA+NON-PROBE: NOT DETECTED
EOSINOPHIL # BLD AUTO: 0 X10(3)/MCL (ref 0.04–0.36)
EOSINOPHIL # BLD AUTO: 0.01 X10(3)/MCL (ref 0.04–0.36)
EOSINOPHIL # BLD AUTO: 0.02 X10(3)/MCL (ref 0.04–0.36)
EOSINOPHIL # BLD AUTO: 0.06 X10(3)/MCL (ref 0.04–0.36)
EOSINOPHIL # BLD AUTO: 0.13 X10(3)/MCL (ref 0.04–0.36)
EOSINOPHIL # BLD AUTO: 0.14 X10(3)/MCL (ref 0.04–0.36)
EOSINOPHIL # BLD AUTO: 0.2 X10(3)/MCL (ref 0.04–0.36)
EOSINOPHIL # BLD AUTO: 0.25 X10(3)/MCL (ref 0.04–0.36)
EOSINOPHIL # BLD AUTO: 0.27 X10(3)/MCL (ref 0.04–0.36)
EOSINOPHIL # BLD AUTO: 0.33 X10(3)/MCL (ref 0.04–0.36)
EOSINOPHIL NFR BLD AUTO: 0 % (ref 0.7–7)
EOSINOPHIL NFR BLD AUTO: 0.1 % (ref 0.7–7)
EOSINOPHIL NFR BLD AUTO: 0.2 % (ref 0.7–7)
EOSINOPHIL NFR BLD AUTO: 0.5 % (ref 0.7–7)
EOSINOPHIL NFR BLD AUTO: 0.9 % (ref 0.7–7)
EOSINOPHIL NFR BLD AUTO: 1 % (ref 0.7–7)
EOSINOPHIL NFR BLD AUTO: 1.2 % (ref 0.7–7)
EOSINOPHIL NFR BLD AUTO: 1.8 % (ref 0.7–7)
EOSINOPHIL NFR BLD AUTO: 2.4 % (ref 0.7–7)
EOSINOPHIL NFR BLD AUTO: 3 % (ref 0.7–7)
EOSINOPHIL NFR BLD MANUAL: 0.17 X10(3)/MCL (ref 0–0.9)
EOSINOPHIL NFR BLD MANUAL: 1 % (ref 0–3)
EPEC EAE GENE STL QL NAA+NON-PROBE: NOT DETECTED
ERYTHROCYTE [DISTWIDTH] IN BLOOD BY AUTOMATED COUNT: 15 % (ref 11–14.5)
ERYTHROCYTE [DISTWIDTH] IN BLOOD BY AUTOMATED COUNT: 16 % (ref 11–14.5)
ERYTHROCYTE [DISTWIDTH] IN BLOOD BY AUTOMATED COUNT: 16.5 % (ref 11–14.5)
ERYTHROCYTE [DISTWIDTH] IN BLOOD BY AUTOMATED COUNT: 16.6 % (ref 11–14.5)
ERYTHROCYTE [DISTWIDTH] IN BLOOD BY AUTOMATED COUNT: 16.6 % (ref 11–14.5)
ERYTHROCYTE [DISTWIDTH] IN BLOOD BY AUTOMATED COUNT: 16.9 % (ref 11–14.5)
ERYTHROCYTE [DISTWIDTH] IN BLOOD BY AUTOMATED COUNT: 17.2 % (ref 11–14.5)
ERYTHROCYTE [DISTWIDTH] IN BLOOD BY AUTOMATED COUNT: 17.2 % (ref 11–14.5)
ERYTHROCYTE [DISTWIDTH] IN BLOOD BY AUTOMATED COUNT: 17.3 % (ref 11–14.5)
ERYTHROCYTE [DISTWIDTH] IN BLOOD BY AUTOMATED COUNT: 17.3 % (ref 11–14.5)
ERYTHROCYTE [DISTWIDTH] IN BLOOD BY AUTOMATED COUNT: 17.6 % (ref 11–14.5)
ETEC LTA+ST1A+ST1B TOX ST NAA+NON-PROBE: NOT DETECTED
FERRITIN SERPL-MCNC: 564 NG/ML (ref 6.24–264)
FOLATE SERPL-MCNC: 14.1 NG/ML (ref 2.8–20)
FOLATE SERPL-MCNC: 8.8 NG/ML (ref 2.8–20)
G LAMBLIA DNA STL QL NAA+NON-PROBE: NOT DETECTED
GFR SERPLBLD CREATININE-BSD FMLA CKD-EPI: 20 MLS/MIN/1.73/M2
GFR SERPLBLD CREATININE-BSD FMLA CKD-EPI: 20 MLS/MIN/1.73/M2
GFR SERPLBLD CREATININE-BSD FMLA CKD-EPI: 21 MLS/MIN/1.73/M2
GFR SERPLBLD CREATININE-BSD FMLA CKD-EPI: 21 MLS/MIN/1.73/M2
GFR SERPLBLD CREATININE-BSD FMLA CKD-EPI: 22 MLS/MIN/1.73/M2
GFR SERPLBLD CREATININE-BSD FMLA CKD-EPI: 30 MLS/MIN/1.73/M2
GFR SERPLBLD CREATININE-BSD FMLA CKD-EPI: 37 MLS/MIN/1.73/M2
GFR SERPLBLD CREATININE-BSD FMLA CKD-EPI: 40 MLS/MIN/1.73/M2
GFR SERPLBLD CREATININE-BSD FMLA CKD-EPI: 44 MLS/MIN/1.73/M2
GFR SERPLBLD CREATININE-BSD FMLA CKD-EPI: 53 MLS/MIN/1.73/M2
GFR SERPLBLD CREATININE-BSD FMLA CKD-EPI: 56 MLS/MIN/1.73/M2
GFR SERPLBLD CREATININE-BSD FMLA CKD-EPI: 70 MLS/MIN/1.73/M2
GLOBULIN SER-MCNC: 2.4 GM/DL (ref 2–3.9)
GLOBULIN SER-MCNC: 2.5 GM/DL (ref 2–3.9)
GLOBULIN SER-MCNC: 2.6 GM/DL (ref 2–3.9)
GLOBULIN SER-MCNC: 2.7 GM/DL (ref 2–3.9)
GLOBULIN SER-MCNC: 2.8 GM/DL (ref 2–3.9)
GLOBULIN SER-MCNC: 2.8 GM/DL (ref 2–3.9)
GLOBULIN SER-MCNC: 3.8 GM/DL (ref 2–3.9)
GLUCOSE SERPL-MCNC: 121 MG/DL (ref 70–115)
GLUCOSE SERPL-MCNC: 137 MG/DL (ref 70–115)
GLUCOSE SERPL-MCNC: 150 MG/DL (ref 70–115)
GLUCOSE SERPL-MCNC: 59 MG/DL (ref 70–115)
GLUCOSE SERPL-MCNC: 75 MG/DL (ref 70–115)
GLUCOSE SERPL-MCNC: 77 MG/DL (ref 70–115)
GLUCOSE SERPL-MCNC: 79 MG/DL (ref 70–115)
GLUCOSE SERPL-MCNC: 82 MG/DL (ref 70–115)
GLUCOSE SERPL-MCNC: 84 MG/DL (ref 70–115)
GLUCOSE SERPL-MCNC: 92 MG/DL (ref 70–115)
GLUCOSE SERPL-MCNC: 99 MG/DL (ref 70–115)
GLUCOSE SERPL-MCNC: 99 MG/DL (ref 70–115)
GLUCOSE UR QL STRIP.AUTO: NEGATIVE
GLUCOSE UR QL STRIP.AUTO: NEGATIVE
HCT VFR BLD AUTO: 21.7 % (ref 36–48)
HCT VFR BLD AUTO: 22.6 % (ref 36–48)
HCT VFR BLD AUTO: 23.3 % (ref 36–48)
HCT VFR BLD AUTO: 24.8 % (ref 36–48)
HCT VFR BLD AUTO: 25.2 % (ref 36–48)
HCT VFR BLD AUTO: 25.7 % (ref 36–48)
HCT VFR BLD AUTO: 25.8 % (ref 36–48)
HCT VFR BLD AUTO: 26.8 % (ref 36–48)
HCT VFR BLD AUTO: 27.5 % (ref 36–48)
HCT VFR BLD AUTO: 30.4 % (ref 36–48)
HCT VFR BLD AUTO: 35.8 % (ref 36–48)
HDLC SERPL-MCNC: 30 MG/DL (ref 40–60)
HEMOCCULT SP1 STL QL: NEGATIVE
HGB BLD-MCNC: 10.4 G/DL (ref 11.8–16)
HGB BLD-MCNC: 12.2 G/DL (ref 11.8–16)
HGB BLD-MCNC: 7.2 G/DL (ref 11.8–16)
HGB BLD-MCNC: 7.6 G/DL (ref 11.8–16)
HGB BLD-MCNC: 7.9 G/DL (ref 11.8–16)
HGB BLD-MCNC: 8.2 G/DL (ref 11.8–16)
HGB BLD-MCNC: 8.3 G/DL (ref 11.8–16)
HGB BLD-MCNC: 8.6 G/DL (ref 11.8–16)
HGB BLD-MCNC: 8.8 G/DL (ref 11.8–16)
HGB BLD-MCNC: 8.9 G/DL (ref 11.8–16)
HGB BLD-MCNC: 9.3 G/DL (ref 11.8–16)
HYPOCHROMIA BLD QL SMEAR: ABNORMAL
IMM GRANULOCYTES # BLD AUTO: 0.03 X10(3)/MCL (ref 0–0.03)
IMM GRANULOCYTES # BLD AUTO: 0.04 X10(3)/MCL (ref 0–0.03)
IMM GRANULOCYTES # BLD AUTO: 0.18 X10(3)/MCL (ref 0–0.03)
IMM GRANULOCYTES # BLD AUTO: 0.2 X10(3)/MCL (ref 0–0.03)
IMM GRANULOCYTES # BLD AUTO: 0.23 X10(3)/MCL (ref 0–0.03)
IMM GRANULOCYTES # BLD AUTO: 0.24 X10(3)/MCL (ref 0–0.03)
IMM GRANULOCYTES # BLD AUTO: 0.27 X10(3)/MCL (ref 0–0.03)
IMM GRANULOCYTES # BLD AUTO: 0.36 X10(3)/MCL (ref 0–0.03)
IMM GRANULOCYTES # BLD AUTO: 0.39 X10(3)/MCL (ref 0–0.03)
IMM GRANULOCYTES # BLD AUTO: 0.68 X10(3)/MCL (ref 0–0.03)
IMM GRANULOCYTES NFR BLD AUTO: 0.3 % (ref 0–0.5)
IMM GRANULOCYTES NFR BLD AUTO: 0.5 % (ref 0–0.5)
IMM GRANULOCYTES NFR BLD AUTO: 1.1 % (ref 0–0.5)
IMM GRANULOCYTES NFR BLD AUTO: 1.3 % (ref 0–0.5)
IMM GRANULOCYTES NFR BLD AUTO: 1.4 % (ref 0–0.5)
IMM GRANULOCYTES NFR BLD AUTO: 1.5 % (ref 0–0.5)
IMM GRANULOCYTES NFR BLD AUTO: 1.7 % (ref 0–0.5)
IMM GRANULOCYTES NFR BLD AUTO: 2.9 % (ref 0–0.5)
IMM GRANULOCYTES NFR BLD AUTO: 3.2 % (ref 0–0.5)
IMM GRANULOCYTES NFR BLD AUTO: 4.2 % (ref 0–0.5)
INR PPP: 1
IRON SATN MFR SERPL: ABNORMAL %
IRON SERPL-MCNC: 125 UG/DL (ref 50–170)
KETONES UR QL STRIP.AUTO: NEGATIVE
KETONES UR QL STRIP.AUTO: NEGATIVE
LACTATE SERPL-SCNC: 2.3 MMOL/L (ref 0.4–2)
LACTATE SERPL-SCNC: 2.4 MMOL/L (ref 0.4–2)
LACTATE SERPL-SCNC: 2.5 MMOL/L (ref 0.4–2)
LACTATE SERPL-SCNC: 3.3 MMOL/L (ref 0.4–2)
LDLC SERPL DIRECT ASSAY-SCNC: 37.2 MG/DL (ref 30–100)
LEUKOCYTE ESTERASE UR QL STRIP.AUTO: ABNORMAL
LEUKOCYTE ESTERASE UR QL STRIP.AUTO: ABNORMAL
LIPASE SERPL-CCNC: 659 U/L (ref 23–300)
LYMPHOCYTES # BLD AUTO: 1.36 X10(3)/MCL (ref 1.16–3.74)
LYMPHOCYTES # BLD AUTO: 1.36 X10(3)/MCL (ref 1.16–3.74)
LYMPHOCYTES # BLD AUTO: 1.46 X10(3)/MCL (ref 1.16–3.74)
LYMPHOCYTES # BLD AUTO: 1.53 X10(3)/MCL (ref 1.16–3.74)
LYMPHOCYTES # BLD AUTO: 1.6 X10(3)/MCL (ref 1.16–3.74)
LYMPHOCYTES # BLD AUTO: 1.69 X10(3)/MCL (ref 1.16–3.74)
LYMPHOCYTES # BLD AUTO: 1.71 X10(3)/MCL (ref 1.16–3.74)
LYMPHOCYTES # BLD AUTO: 1.74 X10(3)/MCL (ref 1.16–3.74)
LYMPHOCYTES # BLD AUTO: 1.75 X10(3)/MCL (ref 1.16–3.74)
LYMPHOCYTES # BLD AUTO: 1.89 X10(3)/MCL (ref 1.16–3.74)
LYMPHOCYTES NFR BLD AUTO: 12.5 % (ref 20–55)
LYMPHOCYTES NFR BLD AUTO: 13.1 % (ref 20–55)
LYMPHOCYTES NFR BLD AUTO: 13.9 % (ref 20–55)
LYMPHOCYTES NFR BLD AUTO: 14 % (ref 20–55)
LYMPHOCYTES NFR BLD AUTO: 15.2 % (ref 20–55)
LYMPHOCYTES NFR BLD AUTO: 20.6 % (ref 20–55)
LYMPHOCYTES NFR BLD AUTO: 8.1 % (ref 20–55)
LYMPHOCYTES NFR BLD AUTO: 8.4 % (ref 20–55)
LYMPHOCYTES NFR BLD AUTO: 8.6 % (ref 20–55)
LYMPHOCYTES NFR BLD AUTO: 9.1 % (ref 20–55)
LYMPHOCYTES NFR BLD MANUAL: 18 % (ref 20–55)
LYMPHOCYTES NFR BLD MANUAL: 3.04 X10(3)/MCL
MAGNESIUM SERPL-MCNC: 2.1 MG/DL (ref 1.8–2.4)
MAGNESIUM SERPL-MCNC: 2.2 MG/DL (ref 1.8–2.4)
MAGNESIUM SERPL-MCNC: 2.2 MG/DL (ref 1.8–2.4)
MAGNESIUM SERPL-MCNC: 2.3 MG/DL (ref 1.8–2.4)
MAGNESIUM SERPL-MCNC: 2.3 MG/DL (ref 1.8–2.4)
MAGNESIUM SERPL-MCNC: 2.4 MG/DL (ref 1.8–2.4)
MAGNESIUM SERPL-MCNC: 2.4 MG/DL (ref 1.8–2.4)
MAGNESIUM SERPL-MCNC: 2.9 MG/DL (ref 1.8–2.4)
MCH RBC QN AUTO: 30.3 PG (ref 27–34)
MCH RBC QN AUTO: 30.5 PG (ref 27–34)
MCH RBC QN AUTO: 30.7 PG (ref 27–34)
MCH RBC QN AUTO: 30.8 PG (ref 27–34)
MCH RBC QN AUTO: 30.9 PG (ref 27–34)
MCH RBC QN AUTO: 30.9 PG (ref 27–34)
MCH RBC QN AUTO: 31 PG (ref 27–34)
MCH RBC QN AUTO: 31.1 PG (ref 27–34)
MCH RBC QN AUTO: 31.4 PG (ref 27–34)
MCH RBC QN AUTO: 31.5 PG (ref 27–34)
MCH RBC QN AUTO: 31.6 PG (ref 27–34)
MCHC RBC AUTO-ENTMCNC: 32.5 G/DL (ref 31–37)
MCHC RBC AUTO-ENTMCNC: 32.6 G/DL (ref 31–37)
MCHC RBC AUTO-ENTMCNC: 32.8 G/DL (ref 31–37)
MCHC RBC AUTO-ENTMCNC: 33.2 G/DL (ref 31–37)
MCHC RBC AUTO-ENTMCNC: 33.5 G/DL (ref 31–37)
MCHC RBC AUTO-ENTMCNC: 33.5 G/DL (ref 31–37)
MCHC RBC AUTO-ENTMCNC: 33.8 G/DL (ref 31–37)
MCHC RBC AUTO-ENTMCNC: 34.1 G/DL (ref 31–37)
MCHC RBC AUTO-ENTMCNC: 34.2 G/DL (ref 31–37)
MCHC RBC AUTO-ENTMCNC: 34.5 G/DL (ref 31–37)
MCHC RBC AUTO-ENTMCNC: 35 G/DL (ref 31–37)
MCV RBC AUTO: 90 FL (ref 79–99)
MCV RBC AUTO: 90.5 FL (ref 79–99)
MCV RBC AUTO: 91 FL (ref 79–99)
MCV RBC AUTO: 91.1 FL (ref 79–99)
MCV RBC AUTO: 91.4 FL (ref 79–99)
MCV RBC AUTO: 91.5 FL (ref 79–99)
MCV RBC AUTO: 92.4 FL (ref 79–99)
MCV RBC AUTO: 93.6 FL (ref 79–99)
MCV RBC AUTO: 93.7 FL (ref 79–99)
MCV RBC AUTO: 94.4 FL (ref 79–99)
MCV RBC AUTO: 94.8 FL (ref 79–99)
MICROCYTES BLD QL SMEAR: SLIGHT
MONOCYTES # BLD AUTO: 0.41 X10(3)/MCL (ref 0.24–0.36)
MONOCYTES # BLD AUTO: 0.64 X10(3)/MCL (ref 0.24–0.36)
MONOCYTES # BLD AUTO: 0.74 X10(3)/MCL (ref 0.24–0.36)
MONOCYTES # BLD AUTO: 0.77 X10(3)/MCL (ref 0.24–0.36)
MONOCYTES # BLD AUTO: 0.84 X10(3)/MCL (ref 0.24–0.36)
MONOCYTES # BLD AUTO: 0.85 X10(3)/MCL (ref 0.24–0.36)
MONOCYTES # BLD AUTO: 0.92 X10(3)/MCL (ref 0.24–0.36)
MONOCYTES # BLD AUTO: 0.94 X10(3)/MCL (ref 0.24–0.36)
MONOCYTES # BLD AUTO: 0.95 X10(3)/MCL (ref 0.24–0.36)
MONOCYTES # BLD AUTO: 1.05 X10(3)/MCL (ref 0.24–0.36)
MONOCYTES NFR BLD AUTO: 2.6 % (ref 4.7–12.5)
MONOCYTES NFR BLD AUTO: 4.4 % (ref 4.7–12.5)
MONOCYTES NFR BLD AUTO: 5 % (ref 4.7–12.5)
MONOCYTES NFR BLD AUTO: 5.2 % (ref 4.7–12.5)
MONOCYTES NFR BLD AUTO: 5.4 % (ref 4.7–12.5)
MONOCYTES NFR BLD AUTO: 6.6 % (ref 4.7–12.5)
MONOCYTES NFR BLD AUTO: 7.4 % (ref 4.7–12.5)
MONOCYTES NFR BLD AUTO: 7.7 % (ref 4.7–12.5)
MONOCYTES NFR BLD AUTO: 8.3 % (ref 4.7–12.5)
MONOCYTES NFR BLD AUTO: 9 % (ref 4.7–12.5)
MONOCYTES NFR BLD MANUAL: 0.68 X10(3)/MCL (ref 0.1–1.3)
MONOCYTES NFR BLD MANUAL: 4 % (ref 0–10)
NEUTROPHILS # BLD AUTO: 10.08 X10(3)/MCL (ref 1.56–6.13)
NEUTROPHILS # BLD AUTO: 11.7 X10(3)/MCL (ref 1.56–6.13)
NEUTROPHILS # BLD AUTO: 13.16 X10(3)/MCL (ref 1.56–6.13)
NEUTROPHILS # BLD AUTO: 13.42 X10(3)/MCL (ref 1.56–6.13)
NEUTROPHILS # BLD AUTO: 15.79 X10(3)/MCL (ref 1.56–6.13)
NEUTROPHILS # BLD AUTO: 16.82 X10(3)/MCL (ref 1.56–6.13)
NEUTROPHILS # BLD AUTO: 5.72 X10(3)/MCL (ref 1.56–6.13)
NEUTROPHILS # BLD AUTO: 6.5 X10(3)/MCL (ref 1.56–6.13)
NEUTROPHILS # BLD AUTO: 9.19 X10(3)/MCL (ref 1.56–6.13)
NEUTROPHILS # BLD AUTO: 9.29 X10(3)/MCL (ref 1.56–6.13)
NEUTROPHILS NFR BLD AUTO: 67.1 % (ref 37–73)
NEUTROPHILS NFR BLD AUTO: 72.8 % (ref 37–73)
NEUTROPHILS NFR BLD AUTO: 74.5 % (ref 37–73)
NEUTROPHILS NFR BLD AUTO: 74.8 % (ref 37–73)
NEUTROPHILS NFR BLD AUTO: 78.8 % (ref 37–73)
NEUTROPHILS NFR BLD AUTO: 80.7 % (ref 37–73)
NEUTROPHILS NFR BLD AUTO: 83.4 % (ref 37–73)
NEUTROPHILS NFR BLD AUTO: 83.7 % (ref 37–73)
NEUTROPHILS NFR BLD AUTO: 83.8 % (ref 37–73)
NEUTROPHILS NFR BLD AUTO: 83.8 % (ref 37–73)
NEUTROPHILS NFR BLD MANUAL: 77 % (ref 37–73)
NITRITE UR QL STRIP.AUTO: NEGATIVE
NITRITE UR QL STRIP.AUTO: NEGATIVE
NOROVIRUS GI+II RNA STL QL NAA+NON-PROBE: NOT DETECTED
NRBC BLD AUTO-RTO: 0 %
NRBC BLD AUTO-RTO: 0.1 %
NRBC BLD AUTO-RTO: 0.2 %
NRBC BLD AUTO-RTO: 0.2 %
NRBC BLD AUTO-RTO: 0.3 %
P SHIGELLOIDES DNA STL QL NAA+NON-PROBE: NOT DETECTED
PH UR STRIP.AUTO: 5.5 [PH]
PH UR STRIP.AUTO: 5.5 [PH]
PHOSPHATE SERPL-MCNC: 2.2 MG/DL (ref 2.5–4.9)
PHOSPHATE SERPL-MCNC: 2.5 MG/DL (ref 2.5–4.9)
PHOSPHATE SERPL-MCNC: 2.8 MG/DL (ref 2.5–4.9)
PHOSPHATE SERPL-MCNC: 3.2 MG/DL (ref 2.5–4.9)
PHOSPHATE SERPL-MCNC: 3.6 MG/DL (ref 2.5–4.9)
PHOSPHATE SERPL-MCNC: 4.3 MG/DL (ref 2.5–4.9)
PHOSPHATE SERPL-MCNC: 6.1 MG/DL (ref 2.5–4.9)
PLATELET # BLD AUTO: 185 X10(3)/MCL (ref 140–371)
PLATELET # BLD AUTO: 194 X10(3)/MCL (ref 140–371)
PLATELET # BLD AUTO: 205 X10(3)/MCL (ref 140–371)
PLATELET # BLD AUTO: 223 X10(3)/MCL (ref 140–371)
PLATELET # BLD AUTO: 223 X10(3)/MCL (ref 140–371)
PLATELET # BLD AUTO: 248 X10(3)/MCL (ref 140–371)
PLATELET # BLD AUTO: 250 X10(3)/MCL (ref 140–371)
PLATELET # BLD AUTO: 276 X10(3)/MCL (ref 140–371)
PLATELET # BLD AUTO: 280 X10(3)/MCL (ref 140–371)
PLATELET # BLD AUTO: 283 X10(3)/MCL (ref 140–371)
PLATELET # BLD AUTO: 394 X10(3)/MCL (ref 140–371)
PLATELET # BLD EST: ABNORMAL 10*3/UL
PLATELET # BLD EST: ADEQUATE 10*3/UL
PMV BLD AUTO: 10.2 FL (ref 9.4–12.4)
PMV BLD AUTO: 10.4 FL (ref 9.4–12.4)
PMV BLD AUTO: 10.4 FL (ref 9.4–12.4)
PMV BLD AUTO: 10.5 FL (ref 9.4–12.4)
PMV BLD AUTO: 10.7 FL (ref 9.4–12.4)
PMV BLD AUTO: 11 FL (ref 9.4–12.4)
PMV BLD AUTO: 9.7 FL (ref 9.4–12.4)
POCT GLUCOSE: 123 MG/DL (ref 70–110)
POCT GLUCOSE: 140 MG/DL (ref 70–110)
POCT GLUCOSE: 144 MG/DL (ref 70–110)
POIKILOCYTOSIS BLD QL SMEAR: ABNORMAL
POTASSIUM SERPL-SCNC: 3.2 MMOL/L (ref 3.5–5.1)
POTASSIUM SERPL-SCNC: 3.3 MMOL/L (ref 3.5–5.1)
POTASSIUM SERPL-SCNC: 3.5 MMOL/L (ref 3.5–5.1)
POTASSIUM SERPL-SCNC: 3.7 MMOL/L (ref 3.5–5.1)
POTASSIUM SERPL-SCNC: 4.4 MMOL/L (ref 3.5–5.1)
POTASSIUM SERPL-SCNC: 4.5 MMOL/L (ref 3.5–5.1)
POTASSIUM SERPL-SCNC: 4.5 MMOL/L (ref 3.5–5.1)
POTASSIUM SERPL-SCNC: 4.6 MMOL/L (ref 3.5–5.1)
POTASSIUM SERPL-SCNC: 5.3 MMOL/L (ref 3.5–5.1)
POTASSIUM SERPL-SCNC: 5.9 MMOL/L (ref 3.5–5.1)
PROT SERPL-MCNC: 4.6 GM/DL (ref 6.3–8.2)
PROT SERPL-MCNC: 4.8 GM/DL (ref 6.3–8.2)
PROT SERPL-MCNC: 4.8 GM/DL (ref 6.3–8.2)
PROT SERPL-MCNC: 5 GM/DL (ref 6.3–8.2)
PROT SERPL-MCNC: 5 GM/DL (ref 6.3–8.2)
PROT SERPL-MCNC: 5.1 GM/DL (ref 6.3–8.2)
PROT SERPL-MCNC: 5.3 GM/DL (ref 6.3–8.2)
PROT SERPL-MCNC: 5.4 GM/DL (ref 6.3–8.2)
PROT SERPL-MCNC: 7.3 GM/DL (ref 6.3–8.2)
PROT UR QL STRIP.AUTO: 30
PROT UR QL STRIP.AUTO: NEGATIVE
PROTHROMBIN TIME: 13.4 SECONDS (ref 11.4–14)
RBC # BLD AUTO: 2.29 X10(6)/MCL (ref 4–5.1)
RBC # BLD AUTO: 2.49 X10(6)/MCL (ref 4–5.1)
RBC # BLD AUTO: 2.51 X10(6)/MCL (ref 4–5.1)
RBC # BLD AUTO: 2.67 X10(6)/MCL (ref 4–5.1)
RBC # BLD AUTO: 2.74 X10(6)/MCL (ref 4–5.1)
RBC # BLD AUTO: 2.78 X10(6)/MCL (ref 4–5.1)
RBC # BLD AUTO: 2.82 X10(6)/MCL (ref 4–5.1)
RBC # BLD AUTO: 2.86 X10(6)/MCL (ref 4–5.1)
RBC # BLD AUTO: 3.01 X10(6)/MCL (ref 4–5.1)
RBC # BLD AUTO: 3.34 X10(6)/MCL (ref 4–5.1)
RBC # BLD AUTO: 3.93 X10(6)/MCL (ref 4–5.1)
RBC #/AREA URNS AUTO: ABNORMAL /HPF
RBC #/AREA URNS AUTO: ABNORMAL /HPF
RBC MORPH BLD: NORMAL
RBC UR QL AUTO: ABNORMAL
RBC UR QL AUTO: ABNORMAL
RVA RNA STL QL NAA+NON-PROBE: NOT DETECTED
S ENT+BONG DNA STL QL NAA+NON-PROBE: NOT DETECTED
SAPO I+II+IV+V RNA STL QL NAA+NON-PROBE: NOT DETECTED
SHIGELLA SP+EIEC IPAH ST NAA+NON-PROBE: NOT DETECTED
SODIUM SERPL-SCNC: 135 MMOL/L (ref 135–145)
SODIUM SERPL-SCNC: 135 MMOL/L (ref 135–145)
SODIUM SERPL-SCNC: 137 MMOL/L (ref 135–145)
SODIUM SERPL-SCNC: 138 MMOL/L (ref 135–145)
SODIUM SERPL-SCNC: 138 MMOL/L (ref 135–145)
SODIUM SERPL-SCNC: 142 MMOL/L (ref 135–145)
SODIUM SERPL-SCNC: 143 MMOL/L (ref 135–145)
SODIUM SERPL-SCNC: 148 MMOL/L (ref 135–145)
SODIUM SERPL-SCNC: 149 MMOL/L (ref 135–145)
SODIUM SERPL-SCNC: 149 MMOL/L (ref 135–145)
SODIUM SERPL-SCNC: 150 MMOL/L (ref 135–145)
SODIUM SERPL-SCNC: 150 MMOL/L (ref 135–145)
SP GR UR STRIP.AUTO: 1.01 (ref 1–1.03)
SP GR UR STRIP.AUTO: 1.02 (ref 1–1.03)
SPECIMEN OUTDATE: NORMAL
SQUAMOUS #/AREA URNS AUTO: ABNORMAL /HPF
SQUAMOUS #/AREA URNS AUTO: ABNORMAL /HPF
TARGETS BLD QL SMEAR: SLIGHT
TIBC SERPL-MCNC: <150 UG/DL (ref 250–450)
TIBC SERPL-MCNC: <25 UG/DL (ref 70–310)
TRANSFERRIN SERPL-MCNC: 89 MG/DL
TRIGL SERPL-MCNC: 36 MG/DL (ref 30–200)
TROPONIN I SERPL-MCNC: 0.02 NG/ML (ref 0–0.03)
TSH SERPL-ACNC: 0.85 UIU/ML (ref 0.36–3.74)
TSH SERPL-ACNC: 1.03 UIU/ML (ref 0.36–3.74)
UNIT NUMBER: NORMAL
UROBILINOGEN UR STRIP-ACNC: 0.2
UROBILINOGEN UR STRIP-ACNC: 0.2
V CHOL+PARA+VUL DNA STL QL NAA+NON-PROBE: NOT DETECTED
V CHOLERAE DNA STL QL NAA+NON-PROBE: NOT DETECTED
VIT B12 SERPL-MCNC: 920 PG/ML (ref 211–946)
VIT B12 SERPL-MCNC: >1000 PG/ML (ref 211–946)
WBC # SPEC AUTO: 12.28 X10(3)/MCL (ref 4–11.5)
WBC # SPEC AUTO: 12.47 X10(3)/MCL (ref 4–11.5)
WBC # SPEC AUTO: 12.79 X10(3)/MCL (ref 4–11.5)
WBC # SPEC AUTO: 14.48 X10(3)/MCL (ref 4–11.5)
WBC # SPEC AUTO: 15.77 X10(3)/MCL (ref 4–11.5)
WBC # SPEC AUTO: 16.01 X10(3)/MCL (ref 4–11.5)
WBC # SPEC AUTO: 16.89 X10(3)/MCL (ref 4–11.5)
WBC # SPEC AUTO: 18.82 X10(3)/MCL (ref 4–11.5)
WBC # SPEC AUTO: 20.12 X10(3)/MCL (ref 4–11.5)
WBC # SPEC AUTO: 8.51 X10(3)/MCL (ref 4–11.5)
WBC # SPEC AUTO: 8.94 X10(3)/MCL (ref 4–11.5)
WBC #/AREA URNS AUTO: ABNORMAL /HPF
WBC #/AREA URNS AUTO: ABNORMAL /HPF
Y ENTEROCOL DNA STL QL NAA+NON-PROBE: NOT DETECTED
YEAST URNS QL MICRO: ABNORMAL /HPF

## 2023-01-01 PROCEDURE — 25000003 PHARM REV CODE 250: Performed by: FAMILY MEDICINE

## 2023-01-01 PROCEDURE — 63600175 PHARM REV CODE 636 W HCPCS: Performed by: INTERNAL MEDICINE

## 2023-01-01 PROCEDURE — S0179 MEGESTROL 20 MG: HCPCS

## 2023-01-01 PROCEDURE — 94761 N-INVAS EAR/PLS OXIMETRY MLT: CPT

## 2023-01-01 PROCEDURE — 84443 ASSAY THYROID STIM HORMONE: CPT | Performed by: INTERNAL MEDICINE

## 2023-01-01 PROCEDURE — 25000003 PHARM REV CODE 250

## 2023-01-01 PROCEDURE — 96367 TX/PROPH/DG ADDL SEQ IV INF: CPT

## 2023-01-01 PROCEDURE — 25000003 PHARM REV CODE 250: Performed by: INTERNAL MEDICINE

## 2023-01-01 PROCEDURE — 97530 THERAPEUTIC ACTIVITIES: CPT

## 2023-01-01 PROCEDURE — 83690 ASSAY OF LIPASE: CPT | Performed by: FAMILY MEDICINE

## 2023-01-01 PROCEDURE — 83735 ASSAY OF MAGNESIUM: CPT | Performed by: INTERNAL MEDICINE

## 2023-01-01 PROCEDURE — 85025 COMPLETE CBC W/AUTO DIFF WBC: CPT | Performed by: INTERNAL MEDICINE

## 2023-01-01 PROCEDURE — 83735 ASSAY OF MAGNESIUM: CPT

## 2023-01-01 PROCEDURE — 99291 CRITICAL CARE FIRST HOUR: CPT

## 2023-01-01 PROCEDURE — 36415 COLL VENOUS BLD VENIPUNCTURE: CPT

## 2023-01-01 PROCEDURE — 63600175 PHARM REV CODE 636 W HCPCS: Performed by: FAMILY MEDICINE

## 2023-01-01 PROCEDURE — 37000009 HC ANESTHESIA EA ADD 15 MINS: Performed by: SURGERY

## 2023-01-01 PROCEDURE — 87040 BLOOD CULTURE FOR BACTERIA: CPT

## 2023-01-01 PROCEDURE — 84100 ASSAY OF PHOSPHORUS: CPT

## 2023-01-01 PROCEDURE — 85027 COMPLETE CBC AUTOMATED: CPT | Performed by: INTERNAL MEDICINE

## 2023-01-01 PROCEDURE — 63600175 PHARM REV CODE 636 W HCPCS

## 2023-01-01 PROCEDURE — 36000706: Performed by: SURGERY

## 2023-01-01 PROCEDURE — 36415 COLL VENOUS BLD VENIPUNCTURE: CPT | Performed by: FAMILY MEDICINE

## 2023-01-01 PROCEDURE — 80048 BASIC METABOLIC PNL TOTAL CA: CPT | Performed by: INTERNAL MEDICINE

## 2023-01-01 PROCEDURE — 82607 VITAMIN B-12: CPT | Performed by: INTERNAL MEDICINE

## 2023-01-01 PROCEDURE — 63600175 PHARM REV CODE 636 W HCPCS: Performed by: SURGERY

## 2023-01-01 PROCEDURE — 97161 PT EVAL LOW COMPLEX 20 MIN: CPT

## 2023-01-01 PROCEDURE — 87507 IADNA-DNA/RNA PROBE TQ 12-25: CPT

## 2023-01-01 PROCEDURE — D9220A PRA ANESTHESIA: ICD-10-PCS | Mod: ,,, | Performed by: NURSE ANESTHETIST, CERTIFIED REGISTERED

## 2023-01-01 PROCEDURE — 87086 URINE CULTURE/COLONY COUNT: CPT | Performed by: FAMILY MEDICINE

## 2023-01-01 PROCEDURE — D9220A PRA ANESTHESIA: Mod: ,,, | Performed by: NURSE ANESTHETIST, CERTIFIED REGISTERED

## 2023-01-01 PROCEDURE — 96361 HYDRATE IV INFUSION ADD-ON: CPT

## 2023-01-01 PROCEDURE — 93005 ELECTROCARDIOGRAM TRACING: CPT

## 2023-01-01 PROCEDURE — 85025 COMPLETE CBC W/AUTO DIFF WBC: CPT | Performed by: FAMILY MEDICINE

## 2023-01-01 PROCEDURE — 20000000 HC ICU ROOM

## 2023-01-01 PROCEDURE — 51702 INSERT TEMP BLADDER CATH: CPT

## 2023-01-01 PROCEDURE — 11000001 HC ACUTE MED/SURG PRIVATE ROOM

## 2023-01-01 PROCEDURE — 25000003 PHARM REV CODE 250: Performed by: SURGERY

## 2023-01-01 PROCEDURE — 80053 COMPREHEN METABOLIC PANEL: CPT | Performed by: INTERNAL MEDICINE

## 2023-01-01 PROCEDURE — 80053 COMPREHEN METABOLIC PANEL: CPT

## 2023-01-01 PROCEDURE — 85025 COMPLETE CBC W/AUTO DIFF WBC: CPT

## 2023-01-01 PROCEDURE — 36415 COLL VENOUS BLD VENIPUNCTURE: CPT | Performed by: INTERNAL MEDICINE

## 2023-01-01 PROCEDURE — 27000221 HC OXYGEN, UP TO 24 HOURS

## 2023-01-01 PROCEDURE — 96375 TX/PRO/DX INJ NEW DRUG ADDON: CPT

## 2023-01-01 PROCEDURE — 80061 LIPID PANEL: CPT | Performed by: INTERNAL MEDICINE

## 2023-01-01 PROCEDURE — 27000190 HC CPAP FULL FACE MASK W/VALVE

## 2023-01-01 PROCEDURE — 82746 ASSAY OF FOLIC ACID SERUM: CPT | Performed by: INTERNAL MEDICINE

## 2023-01-01 PROCEDURE — 21400001 HC TELEMETRY ROOM

## 2023-01-01 PROCEDURE — 96365 THER/PROPH/DIAG IV INF INIT: CPT

## 2023-01-01 PROCEDURE — 86920 COMPATIBILITY TEST SPIN: CPT | Performed by: INTERNAL MEDICINE

## 2023-01-01 PROCEDURE — 36430 TRANSFUSION BLD/BLD COMPNT: CPT

## 2023-01-01 PROCEDURE — 83880 ASSAY OF NATRIURETIC PEPTIDE: CPT

## 2023-01-01 PROCEDURE — 36000707: Performed by: SURGERY

## 2023-01-01 PROCEDURE — 81001 URINALYSIS AUTO W/SCOPE: CPT | Performed by: FAMILY MEDICINE

## 2023-01-01 PROCEDURE — 82728 ASSAY OF FERRITIN: CPT | Performed by: INTERNAL MEDICINE

## 2023-01-01 PROCEDURE — P9016 RBC LEUKOCYTES REDUCED: HCPCS | Performed by: INTERNAL MEDICINE

## 2023-01-01 PROCEDURE — 93010 ELECTROCARDIOGRAM REPORT: CPT | Mod: ,,, | Performed by: INTERNAL MEDICINE

## 2023-01-01 PROCEDURE — 97110 THERAPEUTIC EXERCISES: CPT

## 2023-01-01 PROCEDURE — 83605 ASSAY OF LACTIC ACID: CPT | Performed by: FAMILY MEDICINE

## 2023-01-01 PROCEDURE — 94660 CPAP INITIATION&MGMT: CPT

## 2023-01-01 PROCEDURE — 93925 LOWER EXTREMITY STUDY: CPT | Mod: TC

## 2023-01-01 PROCEDURE — 83605 ASSAY OF LACTIC ACID: CPT

## 2023-01-01 PROCEDURE — 86901 BLOOD TYPING SEROLOGIC RH(D): CPT | Performed by: INTERNAL MEDICINE

## 2023-01-01 PROCEDURE — 87493 C DIFF AMPLIFIED PROBE: CPT

## 2023-01-01 PROCEDURE — 82270 OCCULT BLOOD FECES: CPT | Performed by: INTERNAL MEDICINE

## 2023-01-01 PROCEDURE — 83540 ASSAY OF IRON: CPT | Performed by: INTERNAL MEDICINE

## 2023-01-01 PROCEDURE — 83880 ASSAY OF NATRIURETIC PEPTIDE: CPT | Performed by: INTERNAL MEDICINE

## 2023-01-01 PROCEDURE — 25000003 PHARM REV CODE 250: Performed by: NURSE ANESTHETIST, CERTIFIED REGISTERED

## 2023-01-01 PROCEDURE — 82550 ASSAY OF CK (CPK): CPT | Performed by: FAMILY MEDICINE

## 2023-01-01 PROCEDURE — 94799 UNLISTED PULMONARY SVC/PX: CPT

## 2023-01-01 PROCEDURE — 81001 URINALYSIS AUTO W/SCOPE: CPT

## 2023-01-01 PROCEDURE — 63600175 PHARM REV CODE 636 W HCPCS: Performed by: NURSE ANESTHETIST, CERTIFIED REGISTERED

## 2023-01-01 PROCEDURE — 80053 COMPREHEN METABOLIC PANEL: CPT | Performed by: FAMILY MEDICINE

## 2023-01-01 PROCEDURE — 82306 VITAMIN D 25 HYDROXY: CPT | Performed by: INTERNAL MEDICINE

## 2023-01-01 PROCEDURE — 87077 CULTURE AEROBIC IDENTIFY: CPT

## 2023-01-01 PROCEDURE — 93010 EKG 12-LEAD: ICD-10-PCS | Mod: ,,, | Performed by: INTERNAL MEDICINE

## 2023-01-01 PROCEDURE — 84484 ASSAY OF TROPONIN QUANT: CPT | Performed by: FAMILY MEDICINE

## 2023-01-01 PROCEDURE — 37000008 HC ANESTHESIA 1ST 15 MINUTES: Performed by: SURGERY

## 2023-01-01 PROCEDURE — 87088 URINE BACTERIA CULTURE: CPT

## 2023-01-01 PROCEDURE — 99900035 HC TECH TIME PER 15 MIN (STAT)

## 2023-01-01 RX ORDER — GLUCAGON 1 MG
1 KIT INJECTION
Status: DISCONTINUED | OUTPATIENT
Start: 2023-01-01 | End: 2023-01-01 | Stop reason: HOSPADM

## 2023-01-01 RX ORDER — ATORVASTATIN CALCIUM 20 MG/1
20 TABLET, FILM COATED ORAL DAILY
Status: DISCONTINUED | OUTPATIENT
Start: 2023-01-01 | End: 2023-01-01

## 2023-01-01 RX ORDER — IBUPROFEN 200 MG
24 TABLET ORAL
Status: DISCONTINUED | OUTPATIENT
Start: 2023-01-01 | End: 2023-01-01 | Stop reason: HOSPADM

## 2023-01-01 RX ORDER — PANTOPRAZOLE SODIUM 40 MG/1
40 TABLET, DELAYED RELEASE ORAL DAILY
Status: DISCONTINUED | OUTPATIENT
Start: 2023-01-01 | End: 2023-01-01 | Stop reason: HOSPADM

## 2023-01-01 RX ORDER — SODIUM CHLORIDE 0.9 % (FLUSH) 0.9 %
10 SYRINGE (ML) INJECTION EVERY 6 HOURS
Status: DISCONTINUED | OUTPATIENT
Start: 2023-01-01 | End: 2023-01-01 | Stop reason: HOSPADM

## 2023-01-01 RX ORDER — MULTIVITAMIN
1 TABLET ORAL DAILY
COMMUNITY

## 2023-01-01 RX ORDER — PROCHLORPERAZINE EDISYLATE 5 MG/ML
5 INJECTION INTRAMUSCULAR; INTRAVENOUS EVERY 6 HOURS PRN
Status: DISCONTINUED | OUTPATIENT
Start: 2023-01-01 | End: 2023-01-01 | Stop reason: HOSPADM

## 2023-01-01 RX ORDER — ENOXAPARIN SODIUM 100 MG/ML
30 INJECTION SUBCUTANEOUS EVERY 24 HOURS
Status: DISCONTINUED | OUTPATIENT
Start: 2023-01-01 | End: 2023-01-01 | Stop reason: HOSPADM

## 2023-01-01 RX ORDER — ONDANSETRON 2 MG/ML
4 INJECTION INTRAMUSCULAR; INTRAVENOUS EVERY 8 HOURS PRN
Status: DISCONTINUED | OUTPATIENT
Start: 2023-01-01 | End: 2023-01-01 | Stop reason: HOSPADM

## 2023-01-01 RX ORDER — ACETAMINOPHEN 500 MG
500 TABLET ORAL EVERY 4 HOURS PRN
COMMUNITY

## 2023-01-01 RX ORDER — FAMOTIDINE 20 MG/1
20 TABLET, FILM COATED ORAL
Status: COMPLETED | OUTPATIENT
Start: 2023-01-01 | End: 2023-01-01

## 2023-01-01 RX ORDER — MEGESTROL ACETATE 40 MG/ML
400 SUSPENSION ORAL DAILY
Status: DISCONTINUED | OUTPATIENT
Start: 2023-01-01 | End: 2023-01-01 | Stop reason: HOSPADM

## 2023-01-01 RX ORDER — ONDANSETRON 4 MG/1
4 TABLET, ORALLY DISINTEGRATING ORAL EVERY 8 HOURS PRN
Status: DISCONTINUED | OUTPATIENT
Start: 2023-01-01 | End: 2023-01-01 | Stop reason: HOSPADM

## 2023-01-01 RX ORDER — LORAZEPAM 2 MG/ML
0.5 INJECTION INTRAMUSCULAR
Status: DISCONTINUED | OUTPATIENT
Start: 2023-01-01 | End: 2023-01-01 | Stop reason: HOSPADM

## 2023-01-01 RX ORDER — LIDOCAINE HYDROCHLORIDE 10 MG/ML
INJECTION INFILTRATION; PERINEURAL
Status: DISCONTINUED | OUTPATIENT
Start: 2023-01-01 | End: 2023-01-01 | Stop reason: HOSPADM

## 2023-01-01 RX ORDER — MORPHINE SULFATE 2 MG/ML
2 INJECTION, SOLUTION INTRAMUSCULAR; INTRAVENOUS EVERY 4 HOURS PRN
Status: DISCONTINUED | OUTPATIENT
Start: 2023-01-01 | End: 2023-01-01 | Stop reason: HOSPADM

## 2023-01-01 RX ORDER — NOREPINEPHRINE BITARTRATE/D5W 8 MG/250ML
0-3 PLASTIC BAG, INJECTION (ML) INTRAVENOUS CONTINUOUS
Status: DISCONTINUED | OUTPATIENT
Start: 2023-01-01 | End: 2023-01-01 | Stop reason: HOSPADM

## 2023-01-01 RX ORDER — ZOLPIDEM TARTRATE 5 MG/1
5 TABLET ORAL NIGHTLY PRN
Status: DISCONTINUED | OUTPATIENT
Start: 2023-01-01 | End: 2023-01-01 | Stop reason: HOSPADM

## 2023-01-01 RX ORDER — LORAZEPAM 2 MG/ML
0.25 INJECTION INTRAMUSCULAR EVERY 6 HOURS PRN
Status: DISCONTINUED | OUTPATIENT
Start: 2023-01-01 | End: 2023-01-01 | Stop reason: HOSPADM

## 2023-01-01 RX ORDER — PHENYLEPHRINE HYDROCHLORIDE 10 MG/ML
INJECTION INTRAVENOUS
Status: DISCONTINUED | OUTPATIENT
Start: 2023-01-01 | End: 2023-01-01

## 2023-01-01 RX ORDER — BUPIVACAINE HYDROCHLORIDE 5 MG/ML
INJECTION, SOLUTION EPIDURAL; INTRACAUDAL
Status: DISCONTINUED | OUTPATIENT
Start: 2023-01-01 | End: 2023-01-01 | Stop reason: HOSPADM

## 2023-01-01 RX ORDER — HYDROCODONE BITARTRATE AND ACETAMINOPHEN 5; 325 MG/1; MG/1
1 TABLET ORAL EVERY 6 HOURS PRN
Status: DISCONTINUED | OUTPATIENT
Start: 2023-01-01 | End: 2023-01-01 | Stop reason: HOSPADM

## 2023-01-01 RX ORDER — MEGESTROL ACETATE 40 MG/ML
10 SUSPENSION ORAL 2 TIMES DAILY
COMMUNITY

## 2023-01-01 RX ORDER — DEMECLOCYCLINE HYDROCHLORIDE 300 MG/1
300 TABLET, FILM COATED ORAL 3 TIMES DAILY
COMMUNITY

## 2023-01-01 RX ORDER — POTASSIUM CHLORIDE 20 MEQ/1
40 TABLET, EXTENDED RELEASE ORAL DAILY
Status: DISCONTINUED | OUTPATIENT
Start: 2023-01-01 | End: 2023-01-01 | Stop reason: HOSPADM

## 2023-01-01 RX ORDER — SODIUM CHLORIDE 9 MG/ML
1000 INJECTION, SOLUTION INTRAVENOUS
Status: COMPLETED | OUTPATIENT
Start: 2023-01-01 | End: 2023-01-01

## 2023-01-01 RX ORDER — DORZOLAMIDE HYDROCHLORIDE AND TIMOLOL MALEATE 20; 5 MG/ML; MG/ML
1 SOLUTION/ DROPS OPHTHALMIC 2 TIMES DAILY
COMMUNITY

## 2023-01-01 RX ORDER — ACETAMINOPHEN 325 MG/1
650 TABLET ORAL EVERY 4 HOURS PRN
Status: DISCONTINUED | OUTPATIENT
Start: 2023-01-01 | End: 2023-01-01

## 2023-01-01 RX ORDER — CIPROFLOXACIN 2 MG/ML
400 INJECTION, SOLUTION INTRAVENOUS
Status: COMPLETED | OUTPATIENT
Start: 2023-01-01 | End: 2023-01-01

## 2023-01-01 RX ORDER — MORPHINE SULFATE 4 MG/ML
4 INJECTION, SOLUTION INTRAMUSCULAR; INTRAVENOUS EVERY 4 HOURS PRN
Status: DISCONTINUED | OUTPATIENT
Start: 2023-01-01 | End: 2023-01-01 | Stop reason: HOSPADM

## 2023-01-01 RX ORDER — SODIUM CHLORIDE 0.9 % (FLUSH) 0.9 %
10 SYRINGE (ML) INJECTION
Status: DISCONTINUED | OUTPATIENT
Start: 2023-01-01 | End: 2023-01-01 | Stop reason: HOSPADM

## 2023-01-01 RX ORDER — MIDAZOLAM HYDROCHLORIDE 1 MG/ML
1 INJECTION INTRAMUSCULAR; INTRAVENOUS
Status: DISCONTINUED | OUTPATIENT
Start: 2023-01-01 | End: 2023-01-01

## 2023-01-01 RX ORDER — TORSEMIDE 20 MG/1
20 TABLET ORAL DAILY
Status: DISCONTINUED | OUTPATIENT
Start: 2023-01-01 | End: 2023-01-01

## 2023-01-01 RX ORDER — LATANOPROST 50 UG/ML
1 SOLUTION/ DROPS OPHTHALMIC NIGHTLY
Status: DISCONTINUED | OUTPATIENT
Start: 2023-01-01 | End: 2023-01-01 | Stop reason: HOSPADM

## 2023-01-01 RX ORDER — ACETAMINOPHEN 650 MG/1
650 SUPPOSITORY RECTAL EVERY 6 HOURS PRN
Status: DISCONTINUED | OUTPATIENT
Start: 2023-01-01 | End: 2023-01-01 | Stop reason: HOSPADM

## 2023-01-01 RX ORDER — TORSEMIDE 20 MG/1
20 TABLET ORAL DAILY
COMMUNITY

## 2023-01-01 RX ORDER — VITAMIN E 268 MG
400 CAPSULE ORAL DAILY
COMMUNITY

## 2023-01-01 RX ORDER — MUPIROCIN 20 MG/G
OINTMENT TOPICAL 2 TIMES DAILY
Status: COMPLETED | OUTPATIENT
Start: 2023-01-01 | End: 2023-01-01

## 2023-01-01 RX ORDER — DILTIAZEM HYDROCHLORIDE 5 MG/ML
INJECTION INTRAVENOUS
Status: DISCONTINUED | OUTPATIENT
Start: 2023-01-01 | End: 2023-01-01

## 2023-01-01 RX ORDER — INSULIN ASPART 100 [IU]/ML
0-5 INJECTION, SOLUTION INTRAVENOUS; SUBCUTANEOUS
Status: DISCONTINUED | OUTPATIENT
Start: 2023-01-01 | End: 2023-01-01 | Stop reason: HOSPADM

## 2023-01-01 RX ORDER — TEMAZEPAM 7.5 MG/1
7.5 CAPSULE ORAL NIGHTLY
COMMUNITY

## 2023-01-01 RX ORDER — HYDROXYZINE PAMOATE 25 MG/1
25 CAPSULE ORAL EVERY 6 HOURS PRN
COMMUNITY

## 2023-01-01 RX ORDER — DORZOLAMIDE HYDROCHLORIDE AND TIMOLOL MALEATE 20; 5 MG/ML; MG/ML
1 SOLUTION/ DROPS OPHTHALMIC 2 TIMES DAILY
Status: DISCONTINUED | OUTPATIENT
Start: 2023-01-01 | End: 2023-01-01 | Stop reason: HOSPADM

## 2023-01-01 RX ORDER — MORPHINE SULFATE 2 MG/ML
0.5 INJECTION, SOLUTION INTRAMUSCULAR; INTRAVENOUS
Status: DISCONTINUED | OUTPATIENT
Start: 2023-01-01 | End: 2023-01-01 | Stop reason: HOSPADM

## 2023-01-01 RX ORDER — LATANOPROST 50 UG/ML
1 SOLUTION/ DROPS OPHTHALMIC NIGHTLY
Status: DISCONTINUED | OUTPATIENT
Start: 2023-01-01 | End: 2023-01-01

## 2023-01-01 RX ORDER — ACETAMINOPHEN 325 MG/1
650 TABLET ORAL EVERY 8 HOURS PRN
Status: DISCONTINUED | OUTPATIENT
Start: 2023-01-01 | End: 2023-01-01 | Stop reason: HOSPADM

## 2023-01-01 RX ORDER — ONDANSETRON HYDROCHLORIDE 8 MG/1
8 TABLET, FILM COATED ORAL EVERY 6 HOURS PRN
COMMUNITY

## 2023-01-01 RX ORDER — ASCORBIC ACID 500 MG
500 TABLET ORAL DAILY
COMMUNITY

## 2023-01-01 RX ORDER — MINOXIDIL 2.5 MG/1
2.5 TABLET ORAL DAILY
Start: 2023-01-01

## 2023-01-01 RX ORDER — ASPIRIN 81 MG/1
81 TABLET ORAL DAILY
Status: DISCONTINUED | OUTPATIENT
Start: 2023-01-01 | End: 2023-01-01 | Stop reason: HOSPADM

## 2023-01-01 RX ORDER — PROCHLORPERAZINE EDISYLATE 5 MG/ML
5 INJECTION INTRAMUSCULAR; INTRAVENOUS
Status: COMPLETED | OUTPATIENT
Start: 2023-01-01 | End: 2023-01-01

## 2023-01-01 RX ORDER — TALC
9 POWDER (GRAM) TOPICAL NIGHTLY
Status: DISCONTINUED | OUTPATIENT
Start: 2023-01-01 | End: 2023-01-01 | Stop reason: HOSPADM

## 2023-01-01 RX ORDER — LOSARTAN POTASSIUM 25 MG/1
25 TABLET ORAL DAILY
Status: DISCONTINUED | OUTPATIENT
Start: 2023-01-01 | End: 2023-01-01 | Stop reason: HOSPADM

## 2023-01-01 RX ORDER — POTASSIUM CHLORIDE 750 MG/1
20 CAPSULE, EXTENDED RELEASE ORAL ONCE
COMMUNITY

## 2023-01-01 RX ORDER — DIPHENOXYLATE HYDROCHLORIDE AND ATROPINE SULFATE 2.5; .025 MG/1; MG/1
1 TABLET ORAL 4 TIMES DAILY PRN
Status: DISCONTINUED | OUTPATIENT
Start: 2023-01-01 | End: 2023-01-01 | Stop reason: HOSPADM

## 2023-01-01 RX ORDER — LANOLIN ALCOHOL/MO/W.PET/CERES
400 CREAM (GRAM) TOPICAL DAILY
Status: DISCONTINUED | OUTPATIENT
Start: 2023-01-01 | End: 2023-01-01 | Stop reason: HOSPADM

## 2023-01-01 RX ORDER — PROPOFOL 10 MG/ML
VIAL (ML) INTRAVENOUS
Status: DISCONTINUED | OUTPATIENT
Start: 2023-01-01 | End: 2023-01-01

## 2023-01-01 RX ORDER — HYDROMORPHONE HYDROCHLORIDE 1 MG/ML
0.5 INJECTION, SOLUTION INTRAMUSCULAR; INTRAVENOUS; SUBCUTANEOUS EVERY 6 HOURS PRN
Status: DISCONTINUED | OUTPATIENT
Start: 2023-01-01 | End: 2023-01-01 | Stop reason: HOSPADM

## 2023-01-01 RX ORDER — FENTANYL CITRATE 50 UG/ML
INJECTION, SOLUTION INTRAMUSCULAR; INTRAVENOUS
Status: DISCONTINUED | OUTPATIENT
Start: 2023-01-01 | End: 2023-01-01

## 2023-01-01 RX ORDER — BISACODYL 10 MG
10 SUPPOSITORY, RECTAL RECTAL DAILY PRN
COMMUNITY

## 2023-01-01 RX ORDER — ACETAMINOPHEN 325 MG/1
650 TABLET ORAL EVERY 6 HOURS PRN
Status: DISCONTINUED | OUTPATIENT
Start: 2023-01-01 | End: 2023-01-01

## 2023-01-01 RX ORDER — IBUPROFEN 200 MG
16 TABLET ORAL
Status: DISCONTINUED | OUTPATIENT
Start: 2023-01-01 | End: 2023-01-01 | Stop reason: HOSPADM

## 2023-01-01 RX ORDER — LEVOFLOXACIN 500 MG/1
500 TABLET, FILM COATED ORAL DAILY
Qty: 5 TABLET | Refills: 0 | Status: SHIPPED | OUTPATIENT
Start: 2023-01-01 | End: 2023-01-01

## 2023-01-01 RX ORDER — HYDROCODONE BITARTRATE AND ACETAMINOPHEN 500; 5 MG/1; MG/1
TABLET ORAL
Status: DISCONTINUED | OUTPATIENT
Start: 2023-01-01 | End: 2023-01-01 | Stop reason: HOSPADM

## 2023-01-01 RX ORDER — SODIUM CHLORIDE 9 MG/ML
INJECTION, SOLUTION INTRAVENOUS CONTINUOUS
Status: DISCONTINUED | OUTPATIENT
Start: 2023-01-01 | End: 2023-01-01

## 2023-01-01 RX ORDER — GLYCOPYRROLATE 0.2 MG/ML
0.1 INJECTION INTRAMUSCULAR; INTRAVENOUS
Status: DISCONTINUED | OUTPATIENT
Start: 2023-01-01 | End: 2023-01-01 | Stop reason: SDUPTHER

## 2023-01-01 RX ORDER — LATANOPROST 50 UG/ML
1 SOLUTION/ DROPS OPHTHALMIC NIGHTLY
COMMUNITY

## 2023-01-01 RX ORDER — LANOLIN ALCOHOL/MO/W.PET/CERES
400 CREAM (GRAM) TOPICAL DAILY
Status: ON HOLD | COMMUNITY
End: 2023-01-01

## 2023-01-01 RX ORDER — POTASSIUM CHLORIDE 750 MG/1
10 TABLET, EXTENDED RELEASE ORAL DAILY
Status: CANCELLED | OUTPATIENT
Start: 2023-01-01

## 2023-01-01 RX ORDER — POLYETHYLENE GLYCOL 3350 17 G/17G
17 POWDER, FOR SOLUTION ORAL DAILY
COMMUNITY

## 2023-01-01 RX ORDER — POTASSIUM CHLORIDE 20 MEQ/1
40 TABLET, EXTENDED RELEASE ORAL ONCE
Status: COMPLETED | OUTPATIENT
Start: 2023-01-01 | End: 2023-01-01

## 2023-01-01 RX ORDER — BISACODYL 5 MG
10 TABLET, DELAYED RELEASE (ENTERIC COATED) ORAL 2 TIMES DAILY
Status: DISCONTINUED | OUTPATIENT
Start: 2023-01-01 | End: 2023-01-01 | Stop reason: HOSPADM

## 2023-01-01 RX ORDER — ACETAMINOPHEN, DIPHENHYDRAMINE HCL, PHENYLEPHRINE HCL 325; 25; 5 MG/1; MG/1; MG/1
5 TABLET ORAL NIGHTLY
COMMUNITY

## 2023-01-01 RX ORDER — ACETAMINOPHEN 500 MG
10 TABLET ORAL NIGHTLY
Status: DISCONTINUED | OUTPATIENT
Start: 2023-01-01 | End: 2023-01-01

## 2023-01-01 RX ORDER — ATORVASTATIN CALCIUM 20 MG/1
20 TABLET, FILM COATED ORAL DAILY
Status: DISCONTINUED | OUTPATIENT
Start: 2023-01-01 | End: 2023-01-01 | Stop reason: HOSPADM

## 2023-01-01 RX ORDER — FAMOTIDINE 20 MG/1
20 TABLET, FILM COATED ORAL
Status: DISCONTINUED | OUTPATIENT
Start: 2023-01-01 | End: 2023-01-01

## 2023-01-01 RX ORDER — POLYETHYLENE GLYCOL 3350 17 G/17G
17 POWDER, FOR SOLUTION ORAL DAILY
Status: DISCONTINUED | OUTPATIENT
Start: 2023-01-01 | End: 2023-01-01 | Stop reason: HOSPADM

## 2023-01-01 RX ORDER — MINOXIDIL 2.5 MG/1
2.5 TABLET ORAL DAILY
Status: DISCONTINUED | OUTPATIENT
Start: 2023-01-01 | End: 2023-01-01 | Stop reason: HOSPADM

## 2023-01-01 RX ORDER — METRONIDAZOLE 500 MG/100ML
500 INJECTION, SOLUTION INTRAVENOUS
Status: COMPLETED | OUTPATIENT
Start: 2023-01-01 | End: 2023-01-01

## 2023-01-01 RX ORDER — SACUBITRIL AND VALSARTAN 97; 103 MG/1; MG/1
1 TABLET, FILM COATED ORAL 2 TIMES DAILY
COMMUNITY

## 2023-01-01 RX ORDER — MIDAZOLAM HYDROCHLORIDE 1 MG/ML
1 INJECTION INTRAMUSCULAR; INTRAVENOUS
Status: COMPLETED | OUTPATIENT
Start: 2023-01-01 | End: 2023-01-01

## 2023-01-01 RX ORDER — ACETAMINOPHEN 500 MG
10 TABLET ORAL NIGHTLY
Status: DISCONTINUED | OUTPATIENT
Start: 2023-01-01 | End: 2023-01-01 | Stop reason: DRUGHIGH

## 2023-01-01 RX ORDER — POTASSIUM CHLORIDE 20 MEQ/1
20 TABLET, EXTENDED RELEASE ORAL 2 TIMES DAILY
Status: DISCONTINUED | OUTPATIENT
Start: 2023-01-01 | End: 2023-01-01

## 2023-01-01 RX ORDER — ESOMEPRAZOLE MAGNESIUM 40 MG/1
20 CAPSULE, DELAYED RELEASE ORAL
COMMUNITY

## 2023-01-01 RX ORDER — LANOLIN ALCOHOL/MO/W.PET/CERES
400 CREAM (GRAM) TOPICAL DAILY
COMMUNITY

## 2023-01-01 RX ORDER — MEGESTROL ACETATE 40 MG/ML
400 SUSPENSION ORAL 2 TIMES DAILY
Status: DISCONTINUED | OUTPATIENT
Start: 2023-01-01 | End: 2023-01-01 | Stop reason: HOSPADM

## 2023-01-01 RX ORDER — MUPIROCIN 20 MG/G
OINTMENT TOPICAL 2 TIMES DAILY
Status: DISCONTINUED | OUTPATIENT
Start: 2023-01-01 | End: 2023-01-01 | Stop reason: HOSPADM

## 2023-01-01 RX ADMIN — CEFEPIME 1 G: 1 INJECTION, POWDER, FOR SOLUTION INTRAMUSCULAR; INTRAVENOUS at 09:10

## 2023-01-01 RX ADMIN — ATORVASTATIN CALCIUM 20 MG: 20 TABLET, FILM COATED ORAL at 08:10

## 2023-01-01 RX ADMIN — SODIUM CHLORIDE 1000 ML: 9 INJECTION, SOLUTION INTRAVENOUS at 05:11

## 2023-01-01 RX ADMIN — ASPIRIN 81 MG: 81 TABLET, COATED ORAL at 08:10

## 2023-01-01 RX ADMIN — FENTANYL CITRATE 25 MCG: 50 INJECTION, SOLUTION INTRAMUSCULAR; INTRAVENOUS at 03:10

## 2023-01-01 RX ADMIN — PIPERACILLIN AND TAZOBACTAM 4.5 G: 4; .5 INJECTION, POWDER, FOR SOLUTION INTRAVENOUS; PARENTERAL at 11:10

## 2023-01-01 RX ADMIN — ONDANSETRON 4 MG: 2 INJECTION INTRAMUSCULAR; INTRAVENOUS at 07:11

## 2023-01-01 RX ADMIN — HYDROCODONE BITARTRATE AND ACETAMINOPHEN 1 TABLET: 5; 325 TABLET ORAL at 06:10

## 2023-01-01 RX ADMIN — MUPIROCIN: 20 OINTMENT TOPICAL at 08:10

## 2023-01-01 RX ADMIN — HYDROCODONE BITARTRATE AND ACETAMINOPHEN 1 TABLET: 5; 325 TABLET ORAL at 05:10

## 2023-01-01 RX ADMIN — SODIUM CHLORIDE 1000 ML: 9 INJECTION, SOLUTION INTRAVENOUS at 12:10

## 2023-01-01 RX ADMIN — HYDROCODONE BITARTRATE AND ACETAMINOPHEN 1 TABLET: 5; 325 TABLET ORAL at 11:10

## 2023-01-01 RX ADMIN — MINOXIDIL 2.5 MG: 2.5 TABLET ORAL at 08:10

## 2023-01-01 RX ADMIN — BISACODYL 10 MG: 5 TABLET, COATED ORAL at 10:10

## 2023-01-01 RX ADMIN — PIPERACILLIN AND TAZOBACTAM 4.5 G: 4; .5 INJECTION, POWDER, FOR SOLUTION INTRAVENOUS; PARENTERAL at 07:10

## 2023-01-01 RX ADMIN — IRON SUCROSE 100 MG: 20 INJECTION, SOLUTION INTRAVENOUS at 12:10

## 2023-01-01 RX ADMIN — SODIUM CHLORIDE: 9 INJECTION, SOLUTION INTRAVENOUS at 10:10

## 2023-01-01 RX ADMIN — HYDROCODONE BITARTRATE AND ACETAMINOPHEN 1 TABLET: 5; 325 TABLET ORAL at 03:10

## 2023-01-01 RX ADMIN — LATANOPROST 1 DROP: 50 SOLUTION OPHTHALMIC at 08:10

## 2023-01-01 RX ADMIN — ENOXAPARIN SODIUM 30 MG: 30 INJECTION SUBCUTANEOUS at 04:10

## 2023-01-01 RX ADMIN — MINOXIDIL 2.5 MG: 2.5 TABLET ORAL at 09:10

## 2023-01-01 RX ADMIN — PANTOPRAZOLE SODIUM 40 MG: 40 TABLET, DELAYED RELEASE ORAL at 08:10

## 2023-01-01 RX ADMIN — VANCOMYCIN HYDROCHLORIDE 1250 MG: 1.25 INJECTION, POWDER, LYOPHILIZED, FOR SOLUTION INTRAVENOUS at 10:10

## 2023-01-01 RX ADMIN — POTASSIUM CHLORIDE 20 MEQ: 1500 TABLET, EXTENDED RELEASE ORAL at 08:10

## 2023-01-01 RX ADMIN — PANTOPRAZOLE SODIUM 40 MG: 40 TABLET, DELAYED RELEASE ORAL at 09:10

## 2023-01-01 RX ADMIN — SODIUM CHLORIDE: 9 INJECTION, SOLUTION INTRAVENOUS at 12:10

## 2023-01-01 RX ADMIN — IRON SUCROSE 100 MG: 20 INJECTION, SOLUTION INTRAVENOUS at 09:10

## 2023-01-01 RX ADMIN — DORZOLAMIDE HYDROCHLORIDE AND TIMOLOL MALEATE 1 DROP: 22.3; 6.8 SOLUTION/ DROPS OPHTHALMIC at 09:10

## 2023-01-01 RX ADMIN — ONDANSETRON 4 MG: 2 INJECTION INTRAMUSCULAR; INTRAVENOUS at 08:10

## 2023-01-01 RX ADMIN — HYDROCODONE BITARTRATE AND ACETAMINOPHEN 1 TABLET: 5; 325 TABLET ORAL at 12:10

## 2023-01-01 RX ADMIN — HYDROCODONE BITARTRATE AND ACETAMINOPHEN 1 TABLET: 5; 325 TABLET ORAL at 07:10

## 2023-01-01 RX ADMIN — FAMOTIDINE 20 MG: 20 TABLET ORAL at 02:10

## 2023-01-01 RX ADMIN — ASPIRIN 81 MG: 81 TABLET, COATED ORAL at 09:10

## 2023-01-01 RX ADMIN — MINOXIDIL 2.5 MG: 2.5 TABLET ORAL at 10:10

## 2023-01-01 RX ADMIN — ENOXAPARIN SODIUM 30 MG: 30 INJECTION SUBCUTANEOUS at 07:11

## 2023-01-01 RX ADMIN — LOSARTAN POTASSIUM 25 MG: 25 TABLET, FILM COATED ORAL at 09:10

## 2023-01-01 RX ADMIN — MORPHINE SULFATE 0.5 MG: 2 INJECTION, SOLUTION INTRAMUSCULAR; INTRAVENOUS at 05:11

## 2023-01-01 RX ADMIN — LATANOPROST 1 DROP: 50 SOLUTION OPHTHALMIC at 09:10

## 2023-01-01 RX ADMIN — ONDANSETRON 4 MG: 2 INJECTION INTRAMUSCULAR; INTRAVENOUS at 10:10

## 2023-01-01 RX ADMIN — HYDROMORPHONE HYDROCHLORIDE 0.5 MG: 1 INJECTION, SOLUTION INTRAMUSCULAR; INTRAVENOUS; SUBCUTANEOUS at 02:11

## 2023-01-01 RX ADMIN — ACETAMINOPHEN 650 MG: 325 TABLET, FILM COATED ORAL at 10:10

## 2023-01-01 RX ADMIN — PROCHLORPERAZINE EDISYLATE 5 MG: 5 INJECTION INTRAMUSCULAR; INTRAVENOUS at 12:10

## 2023-01-01 RX ADMIN — LORAZEPAM 0.25 MG: 2 INJECTION, SOLUTION INTRAMUSCULAR; INTRAVENOUS at 07:11

## 2023-01-01 RX ADMIN — PHENYLEPHRINE HYDROCHLORIDE 100 MCG: 10 INJECTION, SOLUTION INTRAVENOUS at 03:10

## 2023-01-01 RX ADMIN — HYDROCODONE BITARTRATE AND ACETAMINOPHEN 1 TABLET: 5; 325 TABLET ORAL at 01:10

## 2023-01-01 RX ADMIN — MORPHINE SULFATE 2 MG: 2 INJECTION, SOLUTION INTRAMUSCULAR; INTRAVENOUS at 06:11

## 2023-01-01 RX ADMIN — ENOXAPARIN SODIUM 30 MG: 30 INJECTION SUBCUTANEOUS at 05:10

## 2023-01-01 RX ADMIN — Medication 9 MG: at 08:10

## 2023-01-01 RX ADMIN — CEFEPIME 1 G: 1 INJECTION, POWDER, FOR SOLUTION INTRAMUSCULAR; INTRAVENOUS at 10:10

## 2023-01-01 RX ADMIN — MEGESTROL ACETATE 400 MG: 40 SUSPENSION ORAL at 08:10

## 2023-01-01 RX ADMIN — PIPERACILLIN AND TAZOBACTAM 4.5 G: 4; .5 INJECTION, POWDER, FOR SOLUTION INTRAVENOUS; PARENTERAL at 04:10

## 2023-01-01 RX ADMIN — MORPHINE SULFATE 4 MG: 4 INJECTION, SOLUTION INTRAMUSCULAR; INTRAVENOUS at 11:11

## 2023-01-01 RX ADMIN — DILTIAZEM HYDROCHLORIDE 5 MCG: 5 INJECTION INTRAVENOUS at 03:10

## 2023-01-01 RX ADMIN — LOSARTAN POTASSIUM 25 MG: 25 TABLET, FILM COATED ORAL at 08:10

## 2023-01-01 RX ADMIN — IRON SUCROSE 100 MG: 20 INJECTION, SOLUTION INTRAVENOUS at 08:10

## 2023-01-01 RX ADMIN — BISACODYL 10 MG: 5 TABLET, COATED ORAL at 08:10

## 2023-01-01 RX ADMIN — SODIUM CHLORIDE: 9 INJECTION, SOLUTION INTRAVENOUS at 07:10

## 2023-01-01 RX ADMIN — DORZOLAMIDE HYDROCHLORIDE AND TIMOLOL MALEATE 1 DROP: 22.3; 6.8 SOLUTION/ DROPS OPHTHALMIC at 10:10

## 2023-01-01 RX ADMIN — VANCOMYCIN HYDROCHLORIDE 750 MG: 750 INJECTION, POWDER, LYOPHILIZED, FOR SOLUTION INTRAVENOUS at 01:10

## 2023-01-01 RX ADMIN — ACETAMINOPHEN 650 MG: 325 TABLET, FILM COATED ORAL at 07:10

## 2023-01-01 RX ADMIN — CEFEPIME 1 G: 1 INJECTION, POWDER, FOR SOLUTION INTRAMUSCULAR; INTRAVENOUS at 08:10

## 2023-01-01 RX ADMIN — GLYCOPYRROLATE 0.1 MG: 0.2 INJECTION, SOLUTION INTRAMUSCULAR; INTRAVITREAL at 02:10

## 2023-01-01 RX ADMIN — MEGESTROL ACETATE 400 MG: 40 SUSPENSION ORAL at 09:10

## 2023-01-01 RX ADMIN — SODIUM CHLORIDE 809 ML: 9 INJECTION, SOLUTION INTRAVENOUS at 04:10

## 2023-01-01 RX ADMIN — PHENYLEPHRINE HYDROCHLORIDE 50 MCG: 10 INJECTION, SOLUTION INTRAVENOUS at 03:10

## 2023-01-01 RX ADMIN — POTASSIUM CHLORIDE 40 MEQ: 1500 TABLET, EXTENDED RELEASE ORAL at 10:10

## 2023-01-01 RX ADMIN — MORPHINE SULFATE 0.5 MG: 2 INJECTION, SOLUTION INTRAMUSCULAR; INTRAVENOUS at 10:11

## 2023-01-01 RX ADMIN — MORPHINE SULFATE 0.5 MG: 2 INJECTION, SOLUTION INTRAMUSCULAR; INTRAVENOUS at 12:11

## 2023-01-01 RX ADMIN — SODIUM CHLORIDE: 9 INJECTION, SOLUTION INTRAVENOUS at 01:10

## 2023-01-01 RX ADMIN — PANTOPRAZOLE SODIUM 40 MG: 40 TABLET, DELAYED RELEASE ORAL at 10:10

## 2023-01-01 RX ADMIN — MORPHINE SULFATE 0.5 MG: 2 INJECTION, SOLUTION INTRAMUSCULAR; INTRAVENOUS at 08:11

## 2023-01-01 RX ADMIN — CEFTRIAXONE SODIUM 1 G: 1 INJECTION, POWDER, FOR SOLUTION INTRAMUSCULAR; INTRAVENOUS at 02:10

## 2023-01-01 RX ADMIN — SODIUM CHLORIDE: 9 INJECTION, SOLUTION INTRAVENOUS at 11:10

## 2023-01-01 RX ADMIN — PIPERACILLIN AND TAZOBACTAM 4.5 G: 4; .5 INJECTION, POWDER, FOR SOLUTION INTRAVENOUS; PARENTERAL at 05:11

## 2023-01-01 RX ADMIN — ENOXAPARIN SODIUM 30 MG: 30 INJECTION SUBCUTANEOUS at 06:10

## 2023-01-01 RX ADMIN — MUPIROCIN: 20 OINTMENT TOPICAL at 09:10

## 2023-01-01 RX ADMIN — ATORVASTATIN CALCIUM 20 MG: 20 TABLET, FILM COATED ORAL at 09:10

## 2023-01-01 RX ADMIN — BISACODYL 10 MG: 5 TABLET, COATED ORAL at 09:10

## 2023-01-01 RX ADMIN — PIPERACILLIN AND TAZOBACTAM 4.5 G: 4; .5 INJECTION, POWDER, FOR SOLUTION INTRAVENOUS; PARENTERAL at 08:10

## 2023-01-01 RX ADMIN — POTASSIUM CHLORIDE 40 MEQ: 1500 TABLET, EXTENDED RELEASE ORAL at 01:10

## 2023-01-01 RX ADMIN — CEFTRIAXONE SODIUM 1 G: 1 INJECTION, POWDER, FOR SOLUTION INTRAMUSCULAR; INTRAVENOUS at 05:10

## 2023-01-01 RX ADMIN — DORZOLAMIDE HYDROCHLORIDE AND TIMOLOL MALEATE 1 DROP: 22.3; 6.8 SOLUTION/ DROPS OPHTHALMIC at 08:10

## 2023-01-01 RX ADMIN — ATORVASTATIN CALCIUM 20 MG: 20 TABLET, FILM COATED ORAL at 10:10

## 2023-01-01 RX ADMIN — MEGESTROL ACETATE 400 MG: 40 SUSPENSION ORAL at 10:10

## 2023-01-01 RX ADMIN — METRONIDAZOLE 500 MG: 500 INJECTION, SOLUTION INTRAVENOUS at 02:10

## 2023-01-01 RX ADMIN — PROPOFOL 30 MG: 10 INJECTION, EMULSION INTRAVENOUS at 03:10

## 2023-01-01 RX ADMIN — ACETAMINOPHEN 650 MG: 325 TABLET, FILM COATED ORAL at 04:10

## 2023-01-01 RX ADMIN — LOSARTAN POTASSIUM 25 MG: 25 TABLET, FILM COATED ORAL at 10:10

## 2023-01-01 RX ADMIN — CIPROFLOXACIN 400 MG: 2 INJECTION, SOLUTION INTRAVENOUS at 02:10

## 2023-01-01 RX ADMIN — ASPIRIN 81 MG: 81 TABLET, COATED ORAL at 10:10

## 2023-01-01 RX ADMIN — HYDROCODONE BITARTRATE AND ACETAMINOPHEN 1 TABLET: 5; 325 TABLET ORAL at 08:10

## 2023-01-01 RX ADMIN — PIPERACILLIN AND TAZOBACTAM 4.5 G: 4; .5 INJECTION, POWDER, FOR SOLUTION INTRAVENOUS; PARENTERAL at 09:10

## 2023-01-01 RX ADMIN — HYDROMORPHONE HYDROCHLORIDE 0.5 MG: 1 INJECTION, SOLUTION INTRAMUSCULAR; INTRAVENOUS; SUBCUTANEOUS at 09:11

## 2023-01-01 RX ADMIN — IRON SUCROSE 100 MG: 20 INJECTION, SOLUTION INTRAVENOUS at 10:10

## 2023-01-01 RX ADMIN — SODIUM CHLORIDE 1000 ML: 9 INJECTION, SOLUTION INTRAVENOUS at 06:11

## 2023-01-01 RX ADMIN — MIDAZOLAM HYDROCHLORIDE 1 MG: 1 INJECTION, SOLUTION INTRAMUSCULAR; INTRAVENOUS at 03:10

## 2023-01-01 RX ADMIN — PIPERACILLIN AND TAZOBACTAM 4.5 G: 4; .5 INJECTION, POWDER, FOR SOLUTION INTRAVENOUS; PARENTERAL at 12:10

## 2023-10-06 PROBLEM — E86.0 DEHYDRATION: Status: ACTIVE | Noted: 2023-01-01

## 2023-10-06 PROBLEM — K52.9 GASTROENTERITIS: Status: ACTIVE | Noted: 2023-01-01

## 2023-10-06 PROBLEM — R65.20 SEPSIS WITH ACUTE RENAL FAILURE WITHOUT SEPTIC SHOCK: Status: ACTIVE | Noted: 2023-01-01

## 2023-10-06 PROBLEM — L89.609 DECUBITUS ULCER OF HEEL: Status: ACTIVE | Noted: 2023-01-01

## 2023-10-06 PROBLEM — N30.00 ACUTE CYSTITIS WITHOUT HEMATURIA: Status: ACTIVE | Noted: 2023-01-01

## 2023-10-06 PROBLEM — R19.7 DIARRHEA: Status: ACTIVE | Noted: 2023-01-01

## 2023-10-06 PROBLEM — N17.9 SEPSIS WITH ACUTE RENAL FAILURE WITHOUT SEPTIC SHOCK: Status: ACTIVE | Noted: 2023-01-01

## 2023-10-06 PROBLEM — E86.1 HYPOTENSION DUE TO HYPOVOLEMIA: Status: ACTIVE | Noted: 2023-01-01

## 2023-10-06 PROBLEM — N17.9 AKI (ACUTE KIDNEY INJURY): Status: ACTIVE | Noted: 2023-01-01

## 2023-10-06 PROBLEM — A41.9 SEPSIS WITH ACUTE RENAL FAILURE WITHOUT SEPTIC SHOCK: Status: ACTIVE | Noted: 2023-01-01

## 2023-10-06 NOTE — HPI
The patient is a 89 year-old female sent from the nursing home for concern of dehydration. She has had diarrhea and vomiting all day at the nursing home. She was found to be hypotensive and was sent out of concern for sepsis. She was started on Rocephin and metronidazole. She voices that she has pain in her legs but otherwise no complaints. There's no chest pain or shortness of breath. No fever or chills noted. No dysuria but she does have evidence of a urinary tract infection. Lactate is 2.5. Of note she does take MgO2 at the NH daily and this could be contributing to her diarrhea.           Past Medical History:   Diagnosis Date    Anemia, unspecified      Anorexia      Blindness      Constipation      Coronary artery disease      GERD (gastroesophageal reflux disease)      Gout, unspecified      Heart failure, unspecified       hypertensive heart disease related    Hypertension      Insomnia      Intervertebral disc disorder with myelopathy, lumbar region      Mixed hyperlipidemia      Mixed hyperlipidemia      NSTEMI (non-ST elevated myocardial infarction)      Pruritus      Small bowel obstruction      Syndrome of inappropriate ADH (SIADH) secretion      Unspecified glaucoma       bilat.    Unspecified glaucoma

## 2023-10-06 NOTE — ED PROVIDER NOTES
Encounter Date: 10/6/2023       History     Chief Complaint   Patient presents with    Hypotension     60/36 BP per ems, [facility], when nurse at [facility] called stated that bp was 84/38    Diarrhea    Vomiting     [facility] nurse reports pt had been N/V all day yesterday, Phenergan 25mg IM admin at 8pm last night, without relief.     81-year-old nursing home patient, arrives via EMS with reports of having diarrhea all day, and vomiting this evening.  Initial blood pressure by EMS was 60 systolic.  There has been no fever or chills.  She is a DNR.    The history is provided by the patient, the EMS personnel and the care home.     Review of patient's allergies indicates:   Allergen Reactions    Amlodipine      Past Medical History:   Diagnosis Date    Anemia, unspecified     Anorexia     Coronary artery disease     GERD (gastroesophageal reflux disease)     Gout, unspecified     Heart failure, unspecified     hypertensive heart disease related    Hypertension     Mixed hyperlipidemia     NSTEMI (non-ST elevated myocardial infarction)     Small bowel obstruction     Syndrome of inappropriate ADH (SIADH) secretion     Unspecified glaucoma     bilat.     Past Surgical History:   Procedure Laterality Date    APPENDECTOMY      CHOLECYSTECTOMY      CORONARY ARTERY BYPASS GRAFT      HYSTERECTOMY       History reviewed. No pertinent family history.     Review of Systems   Constitutional:  Negative for fever.   HENT:  Negative for sore throat.    Respiratory:  Negative for shortness of breath.    Cardiovascular:  Negative for chest pain.   Gastrointestinal:  Positive for diarrhea, nausea and vomiting.   Genitourinary:  Negative for dysuria.   Musculoskeletal:  Negative for back pain.   Skin:  Negative for rash.   Neurological:  Positive for weakness.   Hematological:  Does not bruise/bleed easily.   All other systems reviewed and are negative.      Physical Exam     Initial Vitals   BP Pulse Resp Temp SpO2   10/06/23 0010  10/06/23 0010 10/06/23 0010 10/06/23 0010 10/06/23 0115   (!) 96/38 65 18 98.4 °F (36.9 °C) (!) 94 %      MAP       --                Physical Exam    Nursing note and vitals reviewed.  Constitutional: Vital signs are normal. She appears well-developed and well-nourished. She is cooperative.   Elderly female in no apparent distress.  Answers questions and follows directions appropriately.  Initial ED blood pressure is 96 systolic.  Does not have an IV on arrival.   HENT:   Head: Normocephalic and atraumatic.   Nose: Nose normal.   Mucous membranes are tacky   Eyes: Conjunctivae, EOM and lids are normal. Pupils are equal, round, and reactive to light.   Neck: Trachea normal. Neck supple.   Normal range of motion.  Cardiovascular:  Normal rate, regular rhythm, normal heart sounds and intact distal pulses.           Pulmonary/Chest: Breath sounds normal.   Abdominal: Abdomen is soft. Bowel sounds are normal. She exhibits no distension. There is no abdominal tenderness. There is no rebound and no guarding.   Musculoskeletal:         General: No tenderness or edema. Normal range of motion.      Cervical back: Normal, normal range of motion and neck supple.      Lumbar back: Normal.     Neurological: She is alert and oriented to person, place, and time. She has normal strength. Coordination normal. GCS score is 15. GCS eye subscore is 4. GCS verbal subscore is 5. GCS motor subscore is 6.   Skin: Skin is warm, dry and intact. Capillary refill takes less than 2 seconds.   Psychiatric: She has a normal mood and affect. Her speech is normal and behavior is normal. Judgment and thought content normal. Cognition and memory are normal.         ED Course   Critical Care    Date/Time: 10/6/2023 12:14 AM    Performed by: Robby Herr MD  Authorized by: Saad Lilly MD  Direct patient critical care time: 15 minutes  Additional history critical care time: 15 minutes  Ordering / reviewing critical care time: 20  minutes  Documentation critical care time: 20 minutes  Consulting other physicians critical care time: 5 minutes  Total critical care time (exclusive of procedural time) : 75 minutes  Critical care time was exclusive of separately billable procedures and treating other patients and teaching time.  Critical care was necessary to treat or prevent imminent or life-threatening deterioration of the following conditions: sepsis.  Critical care was time spent personally by me on the following activities: evaluation of patient's response to treatment, examination of patient, obtaining history from patient or surrogate, ordering and performing treatments and interventions, ordering and review of laboratory studies, ordering and review of radiographic studies, pulse oximetry, re-evaluation of patient's condition and review of old charts.  Subsequent provider of critical care: I assumed direction of critical care for this patient from another provider of my specialty.        Labs Reviewed   COMPREHENSIVE METABOLIC PANEL - Abnormal; Notable for the following components:       Result Value    Carbon Dioxide 15 (*)     Glucose Level 150 (*)     Blood Urea Nitrogen 74.0 (*)     Creatinine 2.20 (*)     Alkaline Phosphatase 221 (*)     Alanine Aminotransferase 69 (*)     Aspartate Aminotransferase 63 (*)     Anion Gap 18.0 (*)     BUN/Creatinine Ratio 34 (*)     All other components within normal limits   CBC WITH DIFFERENTIAL - Abnormal; Notable for the following components:    WBC 14.48 (*)     RBC 3.93 (*)     Hct 35.8 (*)     RDW 16.5 (*)     Platelet 394 (*)     Neut % 80.7 (*)     Lymph % 13.1 (*)     Mono % 4.4 (*)     Eos % 0.0 (*)     Neut # 11.70 (*)     Mono # 0.64 (*)     Eos # 0.00 (*)     IG# 0.24 (*)     IG% 1.7 (*)     All other components within normal limits   LACTIC ACID, PLASMA - Abnormal; Notable for the following components:    Lactic Acid Level 2.5 (*)     All other components within normal limits    URINALYSIS, REFLEX TO URINE CULTURE - Abnormal; Notable for the following components:    Appearance, UA Cloudy (*)     Protein, UA 30 (*)     Blood, UA Moderate (*)     Leukocyte Esterase, UA Moderate (*)     All other components within normal limits    Narrative:      URINE STABILITY IS 2 HOURS AT ROOM TEMP OR    SIX HOURS REFRIGERATED. PERFORMING TESTING ON    SPECIMENS GREATER THAN THIS AGE MAY AFFECT THE    FOLLOWING TESTS:    PH          SPECIFIC GRAVITY           BLOOD    CLARITY     BILIRUBIN               UROBILINOGEN   MAGNESIUM - Abnormal; Notable for the following components:    Magnesium Level 2.90 (*)     All other components within normal limits   PHOSPHORUS - Abnormal; Notable for the following components:    Phosphorus Level 6.1 (*)     All other components within normal limits   URINALYSIS, MICROSCOPIC - Abnormal; Notable for the following components:    Bacteria, UA 4+ (*)     WBC, UA 50-99 (*)     All other components within normal limits   C DIFF TOXIN BY PCR - Normal    Narrative:     The Portrait Toxigenic C. difficile assay has not been   evaluated in patients that are <2 years of age.       BLOOD CULTURE [facility]   BLOOD CULTURE [facility]   CULTURE, URINE   GI PANEL    Narrative:     The BioFire GI Panel is a multiplexed nucleic acid test intended for use with the [employer]® FilmArray®, FilmArray® 2.0, or FilmArray® [employer] Systems for the simultaneous qualitative detection and identification of nucleic acids from multiple bacteria, viruses, and parasites directly from stool samples in Tiffanie Brock transport medium obtained from individuals with signs and/or symptoms of gastrointestinal infection.   CBC W/ AUTO DIFFERENTIAL    Narrative:     The following orders were created for panel order CBC auto differential.  Procedure                               Abnormality         Status                     ---------                               -----------         ------                     CBC with  Differential[[id number]]        Abnormal            Final result                 Please view results for these tests on the individual orders.   LACTIC ACID, PLASMA          Imaging Results    None          Medications   sodium chloride 0.9% bolus 809 mL 809 mL (has no administration in time range)   sodium chloride 0.9% bolus 1,000 mL 1,000 mL (0 mLs Intravenous Stopped 10/6/23 0215)   prochlorperazine injection Soln 5 mg (5 mg Intravenous Given 10/6/23 0048)   metronidazole IVPB 500 mg (0 mg Intravenous Stopped 10/6/23 0258)   ciprofloxacin (CIPRO)400mg/200ml D5W IVPB 400 mg (0 mg Intravenous Stopped 10/6/23 0350)     Medical Decision Making  Gastroenteritis, hypotension  Differential diagnosis:  Viral gastroenteritis, bacterial colitis, dehydration, electrolyte imbalance, anemia  IV fluids antiemetics  Lab work, including stool studies  Likely admission.    Amount and/or Complexity of Data Reviewed  Labs: ordered. Decision-making details documented in ED Course.    Risk  Prescription drug management.  Decision regarding hospitalization.               ED Course as of 10/06/23 0439   Fri Oct 06, 2023   0112 Comprehensive metabolic panel(!)  BUN and creatinine significantly elevated versus 2 days ago. [TM]   0113 CBC auto differential(!)  Would qualify for sepsis if a bacterial infection is found. [TM]   0155 Urinalysis, Microscopic(!)  Pretty significant UTI [TM]      ED Course User Index  [TM] Robby Herr MD                    Clinical Impression:   Final diagnoses:  [E86.0] Dehydration (Primary)  [R19.7] Diarrhea, unspecified type  [I95.89, E86.1] Hypotension due to hypovolemia  [N17.9] BETHANY (acute kidney injury)  [K52.9] Gastroenteritis  [A41.9, R65.20, N17.9] Sepsis with acute renal failure without septic shock, due to unspecified organism, unspecified acute renal failure type  [A41.9, R65.20] Severe sepsis  [N30.00] Acute cystitis without hematuria        ED Disposition Condition    Admit Stable                 Robby Herr MD  10/06/23 0432

## 2023-10-06 NOTE — PROGRESS NOTES
[facility]    Patient Name:  Zackery Purdy   MRN:  [id number]  Date: 10/6/2023  Diagnosis: Severe sepsis    History:     Past Medical History:   Diagnosis Date    Anemia, unspecified     Anorexia     Blindness     Constipation     Coronary artery disease     GERD (gastroesophageal reflux disease)     Gout, unspecified     Heart failure, unspecified     hypertensive heart disease related    Hypertension     Insomnia     Intervertebral disc disorder with myelopathy, lumbar region     Mixed hyperlipidemia     Mixed hyperlipidemia     NSTEMI (non-ST elevated myocardial infarction)     Pruritus     Small bowel obstruction     Syndrome of inappropriate ADH (SIADH) secretion     Unspecified glaucoma     bilat.    Unspecified glaucoma        Social History     Socioeconomic History    Marital status:    Tobacco Use    Smoking status: Never    Smokeless tobacco: Never   Substance and Sexual Activity    Alcohol use: Never    Drug use: Never    Sexual activity: Not Currently       Precautions:     Allergies as of 10/06/2023 - Reviewed 10/06/2023   Allergen Reaction Noted    Amlodipine  10/06/2023       WOC Assessment Details/Treatment        10/06/23 1116 10/06/23 1117   WOCN Assessment   WOCN Total Time (mins) 30  --    Visit Date 10/06/23  --    Visit Time 1045  --    Consult Type New  --    WOCN Speciality Wound  --    Intervention assessed;applied  --    Teaching on-going  --    Skin Interventions   Pressure Reduction Devices pressure-redistributing mattress utilized;heel offloading device utilized  --    Pressure Reduction Techniques weight shift assistance provided;frequent weight shift encouraged;heels elevated off bed  --    Positioning   Body Position right;side-lying  --    Positioning/Transfer Devices pillows;wedge;in use  --    Pressure Injury Prevention    Check Moisture Management Pad Done  --    Heel protection technique Heel boot  --    Heel preventative measures Peel  back dressing/boot, assess skin and reapply  --         Altered Skin Integrity 10/06/23 0500 Right Heel Full thickness tissue loss. Base is covered by slough and/or eschar in the wound bed   Date First Assessed/Time First Assessed: 10/06/23 0500   Altered Skin Integrity Present on Admission - Did Patient arrive to the hospital with altered skin?: yes  Side: Right  Location: Heel  Description of Altered Skin Integrity: Full thickness tissu...   Wound Image    --    Description of Altered Skin Integrity Full thickness tissue loss. Base is covered by slough and/or eschar in the wound bed  --    Dressing Appearance Dry;Intact;Clean  --    Drainage Amount None  --    Drainage Characteristics/Odor Malodorous  --    Appearance Eschar  (soft, non-stable)  --    Black (%), Wound Tissue Color 100 %  --    Periwound Area Moist;Macerated  --    Wound Edges Callused;Open  --    Wound Length (cm) 3.2 cm  --    Wound Width (cm) 4.3 cm  --    Wound Depth (cm) 0.3 cm  --    Wound Volume (cm^3) 4.128 cm^3  --    Wound Surface Area (cm^2) 13.76 cm^2  --    Dressing Foam  --    Dressing Change Due 10/10/23  --         Altered Skin Integrity 10/06/23 0500 Right Buttocks Partial thickness tissue loss. Shallow open ulcer with a red or pink wound bed, without slough. Intact or Open/Ruptured Serum-filled blister.   Date First Assessed/Time First Assessed: 10/06/23 0500   Altered Skin Integrity Present on Admission - Did Patient arrive to the hospital with altered skin?: yes  Side: Right  Location: Buttocks  Description of Altered Skin Integrity: Partial thicknes...   Wound Image    --    Description of Altered Skin Integrity Partial thickness tissue loss. Shallow open ulcer with a red or pink wound bed, without slough. Intact or Open/Ruptured Serum-filled blister. Partial thickness tissue loss. Shallow open ulcer with a red or pink wound bed, without slough. Intact or Open/Ruptured Serum-filled blister.   Dressing Appearance  Intact;Dry;Clean Dry;Intact;Clean   Drainage Amount None None   Appearance Moist  (pale white) Pink;Moist   Periwound Area Intact Intact   Wound Edges Defined Defined   Wound Length (cm) 0.9 cm 0.3 cm   Wound Width (cm) 0.9 cm 0.3 cm   Wound Depth (cm) 0.1 cm 0.1 cm   Wound Volume (cm^3) 0.081 cm^3 0.009 cm^3   Wound Surface Area (cm^2) 0.81 cm^2 0.09 cm^2   Dressing Foam Foam   Dressing Change Due 10/10/23 10/10/23        Altered Skin Integrity 10/06/23 0500 Left Buttocks Partial thickness tissue loss. Shallow open ulcer with a red or pink wound bed, without slough. Intact or Open/Ruptured Serum-filled blister.   Date First Assessed/Time First Assessed: 10/06/23 0500   Altered Skin Integrity Present on Admission - Did Patient arrive to the hospital with altered skin?: yes  Side: Left  Location: Buttocks  Description of Altered Skin Integrity: Partial thickness...   Wound Image   --    Description of Altered Skin Integrity Partial thickness tissue loss. Shallow open ulcer with a red or pink wound bed, without slough. Intact or Open/Ruptured Serum-filled blister.  --    Dressing Appearance Dry;Intact;Clean  --    Drainage Amount None  --    Appearance Pink;Moist  --    Wound Edges Defined  --    Wound Length (cm) 0.3 cm  --    Wound Width (cm) 0.3 cm  --    Wound Depth (cm) 0.1 cm  --    Wound Volume (cm^3) 0.009 cm^3  --    Wound Surface Area (cm^2) 0.09 cm^2  --    Dressing Foam  --    Dressing Change Due 10/10/23  --         Altered Skin Integrity 10/06/23 0500 Coccyx Partial thickness tissue loss. Shallow open ulcer with a red or pink wound bed, without slough. Intact or Open/Ruptured Serum-filled blister.   Date First Assessed/Time First Assessed: 10/06/23 0500   Altered Skin Integrity Present on Admission - Did Patient arrive to the hospital with altered skin?: yes  Location: Coccyx  Description of Altered Skin Integrity: Partial thickness tissue loss. ...   Wound Image   --    Description of Altered Skin  Integrity Partial thickness tissue loss. Shallow open ulcer with a red or pink wound bed, without slough. Intact or Open/Ruptured Serum-filled blister.  --    Dressing Appearance Intact;Dry;Clean  --    Drainage Amount None  --    Appearance Pink;Moist  --    Periwound Area Intact  --    Wound Edges Defined  --    Wound Length (cm) 0.2 cm  --    Wound Width (cm) 0.2 cm  --    Wound Depth (cm) 0.1 cm  --    Wound Volume (cm^3) 0.004 cm^3  --    Wound Surface Area (cm^2) 0.04 cm^2  --    Dressing Foam  --    Dressing Change Due 10/10/23  --         Altered Skin Integrity 10/06/23 0500 Perirectal Incontinence associated dermatitis   Date First Assessed/Time First Assessed: 10/06/23 0500   Altered Skin Integrity Present on Admission - Did Patient arrive to the hospital with altered skin?: yes  Location: Perirectal  Primary Wound Type: Incontinence associated dermatitis   Dressing Appearance Open to air  --    Appearance Intact;[place];Moist  --    Care   (barrier cream in place)  --         Altered Skin Integrity 10/06/23 0500 Right anterior Toe, second Purple or maroon localized area of discolored intact skin or non-intact skin or a blood-filled blister.   Date First Assessed/Time First Assessed: 10/06/23 0500   Altered Skin Integrity Present on Admission - Did Patient arrive to the hospital with altered skin?: yes  Side: Right  Orientation: anterior  Location: Toe, second  Description of Altered Skin I...   Wound Image    --    Description of Altered Skin Integrity Purple or maroon localized area of discolored intact skin or non-intact skin or a blood-filled blister.  --    Dressing Appearance Open to air  --    Drainage Amount None  --    Appearance Intact;Maroon  --    Periwound Area Redness  --    Wound Length (cm) 1.2 cm  --    Wound Width (cm) 1 cm  --    Wound Depth (cm) 0 cm  --    Wound Volume (cm^3) 0 cm^3  --    Wound Surface Area (cm^2) 1.2 cm^2  --    Care Applied:;Skin Barrier  --    Dressing   (left open to  air)  --    Dressing Change Due 10/09/23  --     Orders placed.     10/06/2023

## 2023-10-06 NOTE — ASSESSMENT & PLAN NOTE
Patient with acute kidney injury/acute renal failure likely due to pre-renal azotemia due to IVVD BETHANY is currently stable. Baseline creatinine unknown - Labs reviewed- Renal function/electrolytes with Estimated Creatinine Clearance: 14.4 mL/min (A) (based on SCr of 2.2 mg/dL (H)). according to latest data. Monitor urine output and serial BMP and adjust therapy as needed. Avoid nephrotoxins and renally dose meds for GFR listed above.

## 2023-10-06 NOTE — PLAN OF CARE
10/06/23 1132   Discharge Assessment   Assessment Type Discharge Planning Assessment   Confirmed/corrected address, phone number and insurance Yes   Confirmed Demographics Correct on Facesheet   Source of Information health record   When was your last doctors appointment?   (NH resident)   Does patient/caregiver understand observation status No   Communicated SANTINO with patient/caregiver Date not available/Unable to determine   Reason For Admission sepsis   People in Home facility resident   Facility Arrived From: [facility]   Do you expect to return to your current living situation? Yes   Do you have help at home or someone to help you manage your care at home? Yes   Who are your caregiver(s) and their phone number(s)? NH staff   Prior to hospitilization cognitive status: Alert/Oriented   Current cognitive status: Alert/Oriented   Walking or Climbing Stairs ambulation difficulty, requires equipment   Mobility Management wheelchair   Dressing/Bathing bathing difficulty, dependent   Dressing/Bathing Management NH staff   Home Accessibility wheelchair accessible   Home Layout Able to live on 1st floor   Equipment Currently Used at Home wheelchair;hospital bed   Readmission within 30 days? No   Patient currently being followed by outpatient case management? No   Do you currently have service(s) that help you manage your care at home? Yes   Name and Contact number of agency [facility]    Is the pt/caregiver preference to resume services with current agency Yes   Do you take prescription medications? Yes   Do you have prescription coverage? Yes   Coverage MCR   Do you have any problems affording any of your prescribed medications? No   Is the patient taking medications as prescribed? yes   Who is going to help you get home at discharge? NH transportation   How do you get to doctors appointments? other (see comments)  (NH transportation)   Are you on dialysis? No   Do you take coumadin? No   DME  Needed Upon Discharge  none   Transition of Care Barriers None   Discharge Plan A Return to nursing home   Physical Activity   On average, how many days per week do you engage in moderate to strenuous exercise (like a brisk walk)? 0 days   On average, how many minutes do you engage in exercise at this level? 0 min   Financial Resource Strain   How hard is it for you to pay for the very basics like food, housing, medical care, and heating? Not hard   Housing Stability   In the last 12 months, was there a time when you were not able to pay the mortgage or rent on time? N   In the last 12 months, how many places have you lived? 1   In the last 12 months, was there a time when you did not have a steady place to sleep or slept in a shelter (including now)? N   Transportation Needs   In the past 12 months, has lack of transportation kept you from medical appointments or from getting medications? no   In the past 12 months, has lack of transportation kept you from meetings, work, or from getting things needed for daily living? No   Food Insecurity   Within the past 12 months, you worried that your food would run out before you got the money to buy more. Never true   Within the past 12 months, the food you bought just didn't last and you didn't have money to get more. Never true   Stress   Do you feel stress - tense, restless, nervous, or anxious, or unable to sleep at night because your mind is troubled all the time - these days? Not at all   Social Connections   In a typical week, how many times do you talk on the phone with family, friends, or neighbors? Once a week   How often do you get together with friends or relatives? Once   How often do you attend Shinto or Muslim services? 1 to 4   Do you belong to any clubs or organizations such as Shinto groups, unions, fraternal or athletic groups, or school groups? No   How often do you attend meetings of the clubs or organizations you belong to? Never   Are you ,  , , , never , or living with a partner?    Alcohol Use   Q1: How often do you have a drink containing alcohol? Never   Q2: How many drinks containing alcohol do you have on a typical day when you are drinking? None

## 2023-10-06 NOTE — PLAN OF CARE
Problem: Adult Inpatient Plan of Care  Goal: Plan of Care Review  Outcome: Ongoing, Progressing  Goal: Patient-Specific Goal (Individualized)  Outcome: Ongoing, Progressing  Goal: Absence of Hospital-Acquired Illness or Injury  Outcome: Ongoing, Progressing  Goal: Optimal Comfort and Wellbeing  Outcome: Ongoing, Progressing  Goal: Readiness for Transition of Care  Outcome: Ongoing, Progressing     Problem: Adjustment to Illness (Sepsis/Septic Shock)  Goal: Optimal Coping  Outcome: Ongoing, Progressing     Problem: Bleeding (Sepsis/Septic Shock)  Goal: Absence of Bleeding  Outcome: Ongoing, Progressing     Problem: Glycemic Control Impaired (Sepsis/Septic Shock)  Goal: Blood Glucose Level Within Desired Range  Outcome: Ongoing, Progressing     Problem: Diarrhea  Goal: Fluid and Electrolyte Balance  Outcome: Ongoing, Progressing     Problem: Fall Injury Risk  Goal: Absence of Fall and Fall-Related Injury  Outcome: Ongoing, Progressing

## 2023-10-06 NOTE — PT/OT/SLP EVAL
Physical Therapy Evaluation    Patient Name:  Zackery Purdy   MRN:  [id number]    Recommendations:     Discharge Recommendations: nursing facility, skilled   Discharge Equipment Recommendations: wheelchair, hospital bed   Barriers to discharge:  current medical status    Assessment:     Zackery Purdy is a 89 y.o. female admitted with a medical diagnosis of Severe sepsis.  She presents with the following impairments/functional limitations: weakness, impaired endurance, impaired functional mobility, gait instability, impaired balance, decreased upper extremity function, decreased lower extremity function, decreased safety awareness     Patient refused to sit up on side of bed this afternoon due to fatigue, but willing to participate in supine exercises.    Rehab Prognosis: Fair; patient would benefit from acute skilled PT services to address these deficits and reach maximum level of function.    Recent Surgery: * No surgery found *      Plan:     During this hospitalization, patient to be seen 5 x/week (5-6x weekly/1-2x daily) to address the identified rehab impairments via therapeutic activities, therapeutic exercises and progress toward the following goals:    Plan of Care Expires:  11/06/23    Subjective     Chief Complaint: weakness  Patient/Family Comments/goals: to get stronger  Pain/Comfort:       Patients cultural, spiritual, Hinduism conflicts given the current situation:      Living Environment:  Currently at [facility]  Prior to admission, patients level of function was unknown.  Equipment used at home:  .  DME owned (not currently used): none.  Upon discharge, patient will have assistance from NH staff.    Objective:     Communicated with nursing prior to session.  Patient found HOB elevated with peripheral IV, ratliff catheter  upon PT entry to room.    General Precautions: Standard, fall  Orthopedic Precautions:N/A   Braces: N/A  Respiratory Status: Room air    Exams:  RUE ROM: Limited in shoulder  flexion due to bursitis  RUE Strength: grossly 3-/5  LUE ROM: WFL  LUE Strength: grossly 3+/5  RLE ROM: WFL  RLE Strength: grossly 3/5  LLE ROM: WFL  LLE Strength: grossly 3/5    Functional Mobility:        AM-PAC 6 CLICK MOBILITY  Total Score:        Treatment & Education:  See above.     Patient left HOB elevated with all lines intact, call button in reach, and bed alarm on.    GOALS:   Multidisciplinary Problems       Physical Therapy Goals          Problem: Physical Therapy    Goal Priority Disciplines Outcome Goal Variances Interventions   Physical Therapy Goal     PT, PT/OT Ongoing, Progressing     Description: Goals to be met by: discharge     Patient will increase functional independence with mobility by performin. Supine to sit with MInimal Assistance  2. Sit to stand transfer with Minimal Assistance  3. Bed to chair transfer with Minimal Assistance using No Assistive Device                         History:     Past Medical History:   Diagnosis Date    Anemia, unspecified     Anorexia     Blindness     Constipation     Coronary artery disease     Decubitus ulcer of heel 10/6/2023    GERD (gastroesophageal reflux disease)     Gout, unspecified     Heart failure, unspecified     hypertensive heart disease related    Hypertension     Insomnia     Intervertebral disc disorder with myelopathy, lumbar region     Mixed hyperlipidemia     Mixed hyperlipidemia     NSTEMI (non-ST elevated myocardial infarction)     Pruritus     Small bowel obstruction     Syndrome of inappropriate ADH (SIADH) secretion     Unspecified glaucoma     bilat.    Unspecified glaucoma        Past Surgical History:   Procedure Laterality Date    APPENDECTOMY      CHOLECYSTECTOMY      CORONARY ARTERY BYPASS GRAFT      HYSTERECTOMY         Time Tracking:     PT Received On: 10/06/23  PT Start Time: 1430     PT Stop Time: 1445  PT Total Time (min): 15 min     Billable Minutes: Evaluation 15      10/06/2023

## 2023-10-06 NOTE — PROGRESS NOTES
[facility]/Surg  [facility] Medicine  Progress Note    Patient Name: Zackery Purdy  MRN: [id number]  Patient Class: IP- Inpatient   Admission Date: 10/6/2023  Length of Stay: 0 days  Attending Physician: Saad Lilly MD  Primary Care Provider: Carol, Primary Doctor        Subjective:     Principal Problem:Severe sepsis        HPI:  The patient is a 89 year-old female sent from the nursing home for concern of dehydration. She has had diarrhea and vomiting all day at the nursing home. She was found to be hypotensive and was sent out of concern for sepsis. She was started on Rocephin and metronidazole. She voices that she has pain in her legs but otherwise no complaints. There's no chest pain or shortness of breath. No fever or chills noted. No dysuria but she does have evidence of a urinary tract infection. Lactate is 2.5. Of note she does take MgO2 at the NH daily and this could be contributing to her diarrhea.           Past Medical History:   Diagnosis Date    Anemia, unspecified      Anorexia      Blindness      Constipation      Coronary artery disease      GERD (gastroesophageal reflux disease)      Gout, unspecified      Heart failure, unspecified       hypertensive heart disease related    Hypertension      Insomnia      Intervertebral disc disorder with myelopathy, lumbar region      Mixed hyperlipidemia      Mixed hyperlipidemia      NSTEMI (non-ST elevated myocardial infarction)      Pruritus      Small bowel obstruction      Syndrome of inappropriate ADH (SIADH) secretion      Unspecified glaucoma       bilat.    Unspecified glaucoma        Overview/Hospital Course:  10/06/2023 patient resting comfortably tolerating antibiotics.  Cultures are pending.  She is very weak but thinks she can perform some physical therapy.  Right heel shows unstageable decubitus Dr. Lalit Jackson has been consulted.      Interval History:     Review of Systems   Constitutional:  Positive for  appetite change, fatigue and fever. Negative for activity change, chills and diaphoresis.   HENT:  Negative for congestion, ear pain, rhinorrhea and sore throat.    Eyes:  Negative for pain, discharge and redness.   Respiratory:  Negative for apnea, cough, choking, chest tightness, shortness of breath and wheezing.    Cardiovascular:  Negative for chest pain, palpitations and leg swelling.   Gastrointestinal:  Negative for abdominal distention, abdominal pain, blood in stool, constipation, diarrhea, nausea and vomiting.   Endocrine: Negative for cold intolerance, heat intolerance and polyuria.   Genitourinary:  Negative for difficulty urinating, dysuria, frequency, hematuria and pelvic pain.   Musculoskeletal:  Negative for arthralgias, back pain, gait problem, joint swelling and myalgias.   Skin:  Positive for wound. Negative for color change, pallor and rash.   Neurological:  Positive for dizziness and weakness. Negative for seizures, syncope, speech difficulty, numbness and headaches.   Psychiatric/Behavioral:  Negative for agitation, confusion, hallucinations and sleep disturbance. The patient is not nervous/anxious.      Objective:     Vital Signs (Most Recent):  Temp: 97.3 °F (36.3 °C) (10/06/23 1104)  Pulse: 97 (10/06/23 1104)  Resp: 18 (10/06/23 1104)  BP: (!) 122/36 (10/06/23 1104)  SpO2: 97 % (10/06/23 1104) Vital Signs (24h Range):  Temp:  [97.2 °F (36.2 °C)-98.4 °F (36.9 °C)] 97.3 °F (36.3 °C)  Pulse:  [] 97  Resp:  [13-23] 18  SpO2:  [92 %-97 %] 97 %  BP: ()/(36-89) 122/36     Weight: 54.7 kg (120 lb 8 oz)  Body mass index is 19.45 kg/m².    Intake/Output Summary (Last 24 hours) at 10/6/2023 1329  Last data filed at 10/6/2023 0624  Gross per 24 hour   Intake 2209 ml   Output 150 ml   Net 2059 ml         Physical Exam  Constitutional:       General: She is not in acute distress.     Appearance: Normal appearance. She is ill-appearing. She is not toxic-appearing or diaphoretic.   HENT:       Head: Normocephalic and atraumatic.      Right Ear: External ear normal.      Left Ear: External ear normal.      Nose: Nose normal. No congestion or rhinorrhea.      Mouth/Throat:      Mouth: Mucous membranes are dry.      Pharynx: Oropharynx is clear.   Eyes:      Extraocular Movements: Extraocular movements intact.      Conjunctiva/sclera: Conjunctivae normal.      Pupils: Pupils are equal, round, and reactive to light.   Neck:      Vascular: No carotid bruit.   Cardiovascular:      Rate and Rhythm: Normal rate and regular rhythm.      Pulses: Normal pulses.      Heart sounds: Normal heart sounds. No murmur heard.  Pulmonary:      Effort: Pulmonary effort is normal. No respiratory distress.      Breath sounds: Normal breath sounds. No wheezing, rhonchi or rales.   Abdominal:      General: Abdomen is flat. Bowel sounds are normal. There is no distension.      Palpations: Abdomen is soft.      Tenderness: There is no abdominal tenderness. There is no guarding.   Musculoskeletal:         General: No swelling or tenderness. Normal range of motion.      Cervical back: Normal range of motion and neck supple. No tenderness.      Right lower leg: No edema.      Left lower leg: No edema.   Lymphadenopathy:      Cervical: No cervical adenopathy.   Skin:     General: Skin is warm and dry.      Coloration: Skin is not jaundiced or pale.      Findings: Lesion present.   Neurological:      General: No focal deficit present.      Mental Status: She is alert and oriented to person, place, and time. Mental status is at baseline.      Cranial Nerves: No cranial nerve deficit.      Motor: Weakness present.      Coordination: Coordination abnormal.      Gait: Gait abnormal.   Psychiatric:         Mood and Affect: Mood normal.         Behavior: Behavior normal.         Thought Content: Thought content normal.         Judgment: Judgment normal.             Significant Labs: All pertinent labs within the past 24 hours have been  "reviewed.    Significant Imaging: I have reviewed all pertinent imaging results/findings within the past 24 hours.      Assessment/Plan:      * Severe sepsis  This patient does have evidence of infective focus  My overall impression is sepsis.  Source: Urinary Tract  Antibiotics given-   Antibiotics (72h ago, onward)    Start     Stop Route Frequency Ordered    10/06/23 0900  mupirocin 2 % ointment         10/11/23 0859 Nasl 2 times daily 10/06/23 0619    10/06/23 0800  piperacillin-tazobactam (ZOSYN) 4.5 g in dextrose 5 % in water (D5W) 100 mL IVPB (MB+)         -- IV Every 8 hours (non-standard times) 10/06/23 0624        Latest lactate reviewed-  No results for input(s): "LACTATE" in the last 72 hours.  Organ dysfunction indicated by Acute kidney injury    Fluid challenge Ideal Body Weight- The patient's ideal body weight is Ideal body weight: 59.3 kg (130 lb 11.7 oz) which will be used to calculate fluid bolus of 30 ml/kg for treatment of septic shock.      Post- resuscitation assessment Yes Perfusion exam was performed within 6 hours of septic shock presentation after bolus shows Adequate tissue perfusion assessed by non-invasive monitoring       Will Not start Pressors- Levophed for MAP of 65      Decubitus ulcer of heel  Continue antibiotics.  Consult Dr. Paulino.      Dehydration  IV fluids      BETHANY (acute kidney injury)  Patient with acute kidney injury/acute renal failure likely due to pre-renal azotemia due to IVVD BETHANY is currently stable. Baseline creatinine unknown - Labs reviewed- Renal function/electrolytes with Estimated Creatinine Clearance: 14.4 mL/min (A) (based on SCr of 2.2 mg/dL (H)). according to latest data. Monitor urine output and serial BMP and adjust therapy as needed. Avoid nephrotoxins and renally dose meds for GFR listed above.    Coronary artery disease involving coronary bypass graft of native heart    Continue current medications      VTE Risk Mitigation (From admission, onward)         " Ordered     enoxaparin injection 30 mg  Daily         10/06/23 0514     IP VTE HIGH RISK PATIENT  Once         10/06/23 0514                Discharge Planning   SANTINO:      Code Status: DNR   Is the patient medically ready for discharge?:     Reason for patient still in hospital (select all that apply): Patient unstable  Discharge Plan A: Return to nursing home                  Tanner Moncada MD  Department of Hospital Medicine   [facility]/Surg

## 2023-10-06 NOTE — PLAN OF CARE
Problem: Physical Therapy  Goal: Physical Therapy Goal  Description: Goals to be met by: discharge     Patient will increase functional independence with mobility by performin. Supine to sit with MInimal Assistance  2. Sit to stand transfer with Minimal Assistance  3. Bed to chair transfer with Minimal Assistance using No Assistive Device    Outcome: Ongoing, Progressing

## 2023-10-06 NOTE — H&P
[facility]-Med/Surg    History & Physical      Patient Name: Zackery Purdy  MRN: [id number]  Admission Date: 10/6/2023  Attending Physician: Saad Lilly MD   Primary Care Provider: Carol Primary Doctor         Patient information was obtained from patient, ER records, and primary team.     Subjective:     Principal Problem:Severe sepsis    Chief Complaint: Diarrhea, N/V  Chief Complaint   Patient presents with    Hypotension     60/36 BP per ems, [employer], when nurse at [facility] called stated that bp was 84/38    Diarrhea    Vomiting     [facility] nurse reports pt had been N/V all day yesterday, Phenergan 25mg IM admin at 8pm last night, without relief.        HPI: The patient is a 89 year-old female sent from the nursing home for concern of dehydration. She has had diarrhea and vomiting all day at the nursing home. She was found to be hypotensive and was sent out of concern for sepsis. She was started on Rocephin and metronidazole. She voices that she has pain in her legs but otherwise no complaints. There's no chest pain or shortness of breath. No fever or chills noted. No dysuria but she does have evidence of a urinary tract infection. Lactate is 2.5. Of note she does take MgO2 at the NH daily and this could be contributing to her diarrhea.     Past Medical History:   Diagnosis Date    Anemia, unspecified     Anorexia     Blindness     Constipation     Coronary artery disease     GERD (gastroesophageal reflux disease)     Gout, unspecified     Heart failure, unspecified     hypertensive heart disease related    Hypertension     Insomnia     Intervertebral disc disorder with myelopathy, lumbar region     Mixed hyperlipidemia     Mixed hyperlipidemia     NSTEMI (non-ST elevated myocardial infarction)     Pruritus     Small bowel obstruction     Syndrome of inappropriate ADH (SIADH) secretion     Unspecified glaucoma     bilat.    Unspecified glaucoma        Past Surgical History:   Procedure  Laterality Date    APPENDECTOMY      CHOLECYSTECTOMY      CORONARY ARTERY BYPASS GRAFT      HYSTERECTOMY         Review of patient's allergies indicates:   Allergen Reactions    Amlodipine        No current facility-administered medications on file prior to encounter.     Current Outpatient Medications on File Prior to Encounter   Medication Sig    ascorbic acid, vitamin C, (VITAMIN C) 500 MG tablet Take 500 mg by mouth once daily.    aspirin (ECOTRIN) 81 MG EC tablet Take 1 tablet (81 mg total) by mouth once daily.    bisacodyL (DULCOLAX) 10 mg Supp Place 10 mg rectally daily as needed.    demeclocycline (DECLOMYCIN) 300 MG Tab Take 300 mg by mouth 3 (three) times daily.    dorzolamide-timolol 2-0.5% (COSOPT) 22.3-6.8 mg/mL ophthalmic solution Place 1 drop into both eyes 2 (two) times daily.    esomeprazole (NEXIUM) 40 MG capsule Take 40 mg by mouth before breakfast.    hydrOXYzine pamoate (VISTARIL) 25 MG Cap Take 25 mg by mouth every 6 (six) hours as needed (ITCHING RELATED TO PRURITUS).    latanoprost 0.005 % ophthalmic solution Place 1 drop into both eyes every evening.    linaCLOtide (LINZESS) 145 mcg Cap capsule Take 145 mcg by mouth once daily.    losartan (COZAAR) 25 MG tablet Take 25 mg by mouth once daily.    megestroL (MEGACE) 400 mg/10 mL (40 mg/mL) Susp Take 10 mLs by mouth once daily.    melatonin 10 mg Tab Take 5 mg by mouth every evening.    multivitamin (ONE DAILY MULTIVITAMIN) per tablet Take 1 tablet by mouth once daily.    ondansetron (ZOFRAN) 8 MG tablet Take 8 mg by mouth every 6 (six) hours as needed for Nausea.    polyethylene glycol (GLYCOLAX) 17 gram PwPk Take 17 g by mouth once daily.    potassium chloride (MICRO-K) 10 MEQ CpSR Take 20 mEq by mouth once.    sacubitriL-valsartan (ENTRESTO)  mg per tablet Take 1 tablet by mouth 2 (two) times daily.    simvastatin (ZOCOR) 40 MG tablet Take 40 mg by mouth every evening.    temazepam (RESTORIL) 7.5 MG Cap Take 7.5 mg by mouth every  "evening.    torsemide (DEMADEX) 20 MG Tab Take 20 mg by mouth once daily.    vitamin E 400 UNIT capsule Take 400 Units by mouth once daily.    [DISCONTINUED] magnesium oxide (MAG-OX) 400 mg (241.3 mg magnesium) tablet Take 400 mg by mouth once daily.    acetaminophen (TYLENOL EXTRA STRENGTH) 500 MG tablet Take 500 mg by mouth every 4 (four) hours as needed for Pain or Temperature greater than.    [DISCONTINUED] fexofenadine-pseudoephedrine (ALLEGRA-D 24) 180-240 mg per 24 hr tablet Take 1 tablet by mouth once daily.    [DISCONTINUED] furosemide (LASIX) 40 MG tablet Take 40 mg by mouth once daily.    [DISCONTINUED] minoxidiL (LONITEN) 10 MG Tab Take 2.5 mg by mouth once daily.     Family History    None       Tobacco Use    Smoking status: Never    Smokeless tobacco: Never   Substance and Sexual Activity    Alcohol use: Never    Drug use: Never    Sexual activity: Not Currently     Review of Systems 10 pt ROS performed and is otherwise negative unless stated as above.  Objective:     Vital Signs (Most Recent):  Temp: 97.5 °F (36.4 °C) (10/06/23 0500)  Pulse: 76 (10/06/23 0500)  Resp: 18 (10/06/23 0500)  BP: 136/63 (10/06/23 0500)  SpO2: 97 % (10/06/23 0400) Vital Signs (24h Range):  Temp:  [97.5 °F (36.4 °C)-98.4 °F (36.9 °C)] 97.5 °F (36.4 °C)  Pulse:  [] 76  Resp:  [13-23] 18  SpO2:  [92 %-97 %] 97 %  BP: ()/(38-89) 136/63     Weight: 54.7 kg (120 lb 8 oz)  Body mass index is 19.45 kg/m².    Physical Exam   /63 (BP Location: Right arm, Patient Position: Lying)   Pulse 76   Temp 97.5 °F (36.4 °C) (Temporal)   Resp 18   Ht 5' 6" (1.676 m)   Wt 54.7 kg (120 lb 8 oz)   SpO2 97%   Breastfeeding No   BMI 19.45 kg/m²     General Appearance:  Alert, cooperative, no distress, appears stated age   Head:  Normocephalic, without obvious abnormality, atraumatic   Eyes:  PERRL, conjunctiva/corneas clear, EOM's intact, fundi benign, both eyes   Ears:  Normal TM's and external ear canals, both ears "   Nose: Nares normal, septum midline,mucosa normal, no drainage or sinus tenderness   Throat: Lips, mucosa, and tongue normal; teeth and gums normal   Neck: Supple, symmetrical, trachea midline, no adenopathy;  thyroid: not enlarged, symmetric, no tenderness/mass/nodules; no carotid bruit or JVD   Back:   Symmetric, no curvature, ROM normal, no CVA tenderness   Lungs:   Clear to auscultation bilaterally, respirations unlabored   Breasts:  No masses or tenderness   Heart:  Regular rate and rhythm, S1 and S2 normal, no murmur, rub, or gallop   Abdomen:   Soft, non-tender, bowel sounds active all four quadrants,  no masses, no organomegaly   Pelvic: Deferred   Extremities: Extremities normal, atraumatic, no cyanosis or edema. Mild excoriations to heels bilaterally and buttocks.   Pulses: 2+ and symmetric   Skin: Skin color, texture, turgor normal, no rashes or lesions   Lymph nodes: Cervical, supraclavicular, and axillary nodes normal   Neurologic: Normal         Significant Labs: All pertinent labs within the past 24 hours have been reviewed.  Recent Lab Results         10/06/23  0515   10/06/23  0310   10/06/23  0126   10/06/23  0052        Albumin/Globulin Ratio       0.9       Adenovirus F 40/41     Not Detected         Albumin       3.5       ALP       221       ALT       69       Anion Gap       18.0       Appearance, UA     Cloudy         AST       63       ASTROVIRUS     Not Detected         Bacteria, UA     4+         Baso #       0.01       Basophil %       0.1       BILIRUBIN TOTAL       0.8       Bilirubin, UA     Negative         BUN       74.0       BUN/CREAT RATIO       34       Calcium       9.8       CAMPYLOBACTER     Not Detected         Chloride       110       Clostridium difficile toxin PCR     Not Detected         CO2       15       Color, UA     Yellow         Creatinine       2.20       CRYPTOSPORIDIUM     Not Detected         Cycolospora cayetanensis     Not Detected         E. COLI O157      --  Comment: N/A         eGFR       21  Comment:                      EGFR INTERPRETATION    Beginning 8/15/22 we are reporting the eGFRcr calculation as recommended by the [employer]. The eGFRcr equation has similar overall performance characteristics to the older equation, but the values may differ by more than 10% particularly at higher values of eGFRcr and younger adult ages.    NKF stages of chronic kidney disease (CKD)  Stage 1: Kidney damage with normal or increased eGFR (>90 mL/min/1.73 m^2)  Stage 2: Mild reduction in GFR (60-89 mL/min/1.73 m^2)  Stage 3a: Moderate reduction in GFR (45-59 mL/min/1.73 m^2)  Stage 3b: Moderate reduction in GFR (30-44 mL/min/1.73 m^2)  Stage 4: Severe reduction in GFR (15-29 mL/min/1.73 m^2)  Stage 5: Kidney failure (GFR <15 mL/min/1.73 m^2)           Entamoeba histolytica     Not Detected         ENTEROAGGREGATIVE E. COLI (EAEC)     Not Detected         ENTEROPATHOGENIC E. COLI (EPEC)     Not Detected         Enterotoxigenic E. coli (ETEC)     Not Detected         Eos #       0.00       Eosinophil %       0.0       Giardia lamblia     Not Detected         Globulin, Total       3.8       Glucose       150       Glucose, UA     Negative         Hematocrit       35.8       Hemoglobin       12.2       Immature Grans (Abs)       0.24       Immature Granulocytes       1.7       Ketones, UA     Negative         Lactate, Viktor 2.4   2.5           Leukocytes, UA     Moderate         Lymph #       1.89       LYMPH %       13.1       Magnesium        2.90       MCH       31.0       MCHC       34.1       MCV       91.1       Mono #       0.64       Mono %       4.4       MPV       10.4       Neut #       11.70       Neut %       80.7       NITRITE UA     Negative         Norovirus GI/GII     Not Detected         nRBC       0.0       Occult Blood UA     Moderate         pH, UA     5.5         Phosphorus Level       6.1       Platelet Count       394       PLESIOMONAS  SHIGELLOIDES     Not Detected         Potassium       4.4       PROTEIN TOTAL       7.3       Protein, UA     30         RBC       3.93       RBC, UA     0-5         RDW       16.5       Rotavirus A     Not Detected         SALMONELLA     Not Detected         SAPOVIRUS     Not Detected         SHIGA-LIKE TOXIN-PRODUCING E. COLI (STEC)     Not Detected         Shigella/Enteroinvasive E. coli (EIEC)     Not Detected         Sodium       143       Specific Gravity,UA     1.020         Squam Epithel, UA     Occasional         Stool Consistancy     liquid         Urobilinogen, UA     0.2         Vibrio     Not Detected         VIBRIO CHOLERAE     Not Detected         WBC, UA     50-99         WBC       14.48       YERSINIA ENTEROCOLITICA     Not Detected                 Significant Imaging: I have reviewed all pertinent imaging results/findings within the past 24 hours.  I have reviewed and interpreted all pertinent imaging results/findings within the past 24 hours.    Assessment/Plan:     Active Diagnoses:    Diagnosis Date Noted POA    PRINCIPAL PROBLEM:  Severe sepsis 2/2 UTI and diarrhea [A41.9, R65.20] 10/06/2023 Unknown    BETHANY (acute kidney injury) [N17.9] 10/06/2023 Unknown    Hypotension due to hypovolemia [I95.89, E86.1] 10/06/2023 Unknown    Gastroenteritis [K52.9] 10/06/2023 Unknown    Diarrhea [R19.7] 10/06/2023 Unknown    Dehydration [E86.0] 10/06/2023 Unknown    Acute cystitis without hematuria [N30.00] 10/06/2023 Unknown     - Change abx to Zosyn to cover NH organisms and diarrhea  - Gentle volume repletion  - Pain and nausea control  - Advance diet as tolerated  - Follow blood and urine cultures  - Reconcile home meds as warranted  - Hold magnesium  - Wound care to heel and buttock excoriations  - Pt is DNR    This encounter was completed via telemedicine (audio/video) w/ nursing at bedside to assist w/ clinical exam.  SOC Audio/Visual Equipment is using HIPPA Compliant Web Platform. The patient is  located in the hospital and the provider is located off site. This patient is placed in inpatient status under my care.       Participants on Call: Bedside RN, Patient, Physician on Call.     Problems Resolved During this Admission:     VTE Risk Mitigation (From admission, onward)           Ordered     enoxaparin injection 30 mg  Daily         10/06/23 0514     IP VTE HIGH RISK PATIENT  Once         10/06/23 0514                      Rashawn Roque MD  Department of Hospital Medicine   [facility]-Med/Surg

## 2023-10-06 NOTE — ASSESSMENT & PLAN NOTE
"This patient does have evidence of infective focus  My overall impression is sepsis.  Source: Urinary Tract  Antibiotics given-   Antibiotics (72h ago, onward)    Start     Stop Route Frequency Ordered    10/06/23 0900  mupirocin 2 % ointment         10/11/23 0859 Nasl 2 times daily 10/06/23 0619    10/06/23 0800  piperacillin-tazobactam (ZOSYN) 4.5 g in dextrose 5 % in water (D5W) 100 mL IVPB (MB+)         -- IV Every 8 hours (non-standard times) 10/06/23 0624        Latest lactate reviewed-  No results for input(s): "LACTATE" in the last 72 hours.  Organ dysfunction indicated by Acute kidney injury    Fluid challenge Ideal Body Weight- The patient's ideal body weight is Ideal body weight: 59.3 kg (130 lb 11.7 oz) which will be used to calculate fluid bolus of 30 ml/kg for treatment of septic shock.      Post- resuscitation assessment Yes Perfusion exam was performed within 6 hours of septic shock presentation after bolus shows Adequate tissue perfusion assessed by non-invasive monitoring       Will Not start Pressors- Levophed for MAP of 65    "

## 2023-10-06 NOTE — SUBJECTIVE & OBJECTIVE
Interval History:     Review of Systems   Constitutional:  Positive for appetite change, fatigue and fever. Negative for activity change, chills and diaphoresis.   HENT:  Negative for congestion, ear pain, rhinorrhea and sore throat.    Eyes:  Negative for pain, discharge and redness.   Respiratory:  Negative for apnea, cough, choking, chest tightness, shortness of breath and wheezing.    Cardiovascular:  Negative for chest pain, palpitations and leg swelling.   Gastrointestinal:  Negative for abdominal distention, abdominal pain, blood in stool, constipation, diarrhea, nausea and vomiting.   Endocrine: Negative for cold intolerance, heat intolerance and polyuria.   Genitourinary:  Negative for difficulty urinating, dysuria, frequency, hematuria and pelvic pain.   Musculoskeletal:  Negative for arthralgias, back pain, gait problem, joint swelling and myalgias.   Skin:  Positive for wound. Negative for color change, pallor and rash.   Neurological:  Positive for dizziness and weakness. Negative for seizures, syncope, speech difficulty, numbness and headaches.   Psychiatric/Behavioral:  Negative for agitation, confusion, hallucinations and sleep disturbance. The patient is not nervous/anxious.      Objective:     Vital Signs (Most Recent):  Temp: 97.3 °F (36.3 °C) (10/06/23 1104)  Pulse: 97 (10/06/23 1104)  Resp: 18 (10/06/23 1104)  BP: (!) 122/36 (10/06/23 1104)  SpO2: 97 % (10/06/23 1104) Vital Signs (24h Range):  Temp:  [97.2 °F (36.2 °C)-98.4 °F (36.9 °C)] 97.3 °F (36.3 °C)  Pulse:  [] 97  Resp:  [13-23] 18  SpO2:  [92 %-97 %] 97 %  BP: ()/(36-89) 122/36     Weight: 54.7 kg (120 lb 8 oz)  Body mass index is 19.45 kg/m².    Intake/Output Summary (Last 24 hours) at 10/6/2023 1329  Last data filed at 10/6/2023 0624  Gross per 24 hour   Intake 2209 ml   Output 150 ml   Net 2059 ml         Physical Exam  Constitutional:       General: She is not in acute distress.     Appearance: Normal appearance. She is  ill-appearing. She is not toxic-appearing or diaphoretic.   HENT:      Head: Normocephalic and atraumatic.      Right Ear: External ear normal.      Left Ear: External ear normal.      Nose: Nose normal. No congestion or rhinorrhea.      Mouth/Throat:      Mouth: Mucous membranes are dry.      Pharynx: Oropharynx is clear.   Eyes:      Extraocular Movements: Extraocular movements intact.      Conjunctiva/sclera: Conjunctivae normal.      Pupils: Pupils are equal, round, and reactive to light.   Neck:      Vascular: No carotid bruit.   Cardiovascular:      Rate and Rhythm: Normal rate and regular rhythm.      Pulses: Normal pulses.      Heart sounds: Normal heart sounds. No murmur heard.  Pulmonary:      Effort: Pulmonary effort is normal. No respiratory distress.      Breath sounds: Normal breath sounds. No wheezing, rhonchi or rales.   Abdominal:      General: Abdomen is flat. Bowel sounds are normal. There is no distension.      Palpations: Abdomen is soft.      Tenderness: There is no abdominal tenderness. There is no guarding.   Musculoskeletal:         General: No swelling or tenderness. Normal range of motion.      Cervical back: Normal range of motion and neck supple. No tenderness.      Right lower leg: No edema.      Left lower leg: No edema.   Lymphadenopathy:      Cervical: No cervical adenopathy.   Skin:     General: Skin is warm and dry.      Coloration: Skin is not jaundiced or pale.      Findings: Lesion present.   Neurological:      General: No focal deficit present.      Mental Status: She is alert and oriented to person, place, and time. Mental status is at baseline.      Cranial Nerves: No cranial nerve deficit.      Motor: Weakness present.      Coordination: Coordination abnormal.      Gait: Gait abnormal.   Psychiatric:         Mood and Affect: Mood normal.         Behavior: Behavior normal.         Thought Content: Thought content normal.         Judgment: Judgment normal.              Significant Labs: All pertinent labs within the past 24 hours have been reviewed.    Significant Imaging: I have reviewed all pertinent imaging results/findings within the past 24 hours.

## 2023-10-06 NOTE — HOSPITAL COURSE
10/06/2023 patient resting comfortably tolerating antibiotics.  Cultures are pending.  She is very weak but thinks she can perform some physical therapy.  Right heel shows unstageable decubitus Dr. Lalit Jackson has been consulted.  10/07/2023 elderly female from the nursing home in 4 dehydration as well as sepsis.  She is been found to have unstageable heel decubitus with surgery consult.  Today she is noted to be significantly anemic probably due to dilution as well as being chronically anemic.  Order anemia workup and continue her antibiotics and await the surgery consult.  10/08/2023 patient awaiting surgical debridement of her heel decubitus.  She is responding well to treatment for sepsis.  Anemia is slightly improved.  10/09/2023 patient postop day 1 for surgical debridement of heel decubitus.  Still on antibiotics for sepsis.  Awaiting stool for occult blood to workup her anemia.  10/10/2023 postop day 2. For surgical debridement of heel decubitus.  She is on antibiotics for the decubitus as well as sepsis.  White blood cell count is trending up.  Cultures are growing out Klebsiella.  Pharmacy has recommended changing Zosyn to cefepime which we will do.  Repeat some labs tomorrow.  10/11/2023 patient resting comfortably.  She is day 3 from surgical debridement of heel decubitus.  She is receiving Maxipime for the urine culture and to help with the wound.  White blood cell count is still elevated.  10/12/2023 pt needs iv abx, will need Rocephin  will need a PICC line to be able to get iv abx at the nursing home    Pt has had 8 days of iv abx for UTI, will d/c back to nursing home.  Pt had I&D of heel decubitus and doing well post op.  She has wound on the buttocks that is healing and was evaluated by surgery but did need debridement.  Stage 2 POA

## 2023-10-07 NOTE — ASSESSMENT & PLAN NOTE
"This patient does have evidence of infective focus  My overall impression is sepsis.  Source: Urinary Tract  Antibiotics given-   Antibiotics (72h ago, onward)    Start     Stop Route Frequency Ordered    10/07/23 1950  piperacillin-tazobactam (ZOSYN) 4.5 g in dextrose 5 % in water (D5W) 100 mL IVPB (MB+)         -- IV Every 12 hours (non-standard times) 10/07/23 0825    10/06/23 0900  mupirocin 2 % ointment         10/11/23 0859 Nasl 2 times daily 10/06/23 0619        Latest lactate reviewed-  No results for input(s): "LACTATE" in the last 72 hours.  Organ dysfunction indicated by Acute kidney injury    Fluid challenge Ideal Body Weight- The patient's ideal body weight is Ideal body weight: 59.3 kg (130 lb 11.7 oz) which will be used to calculate fluid bolus of 30 ml/kg for treatment of septic shock.      Post- resuscitation assessment Yes Perfusion exam was performed within 6 hours of septic shock presentation after bolus shows Adequate tissue perfusion assessed by non-invasive monitoring       Will Not start Pressors- Levophed for MAP of 65    "

## 2023-10-07 NOTE — SUBJECTIVE & OBJECTIVE
Interval History:     Review of Systems   Constitutional:  Positive for appetite change, fatigue and fever. Negative for activity change, chills and diaphoresis.   HENT:  Negative for congestion, ear pain, rhinorrhea and sore throat.    Eyes:  Negative for pain, discharge and redness.   Respiratory:  Negative for apnea, cough, choking, chest tightness, shortness of breath and wheezing.    Cardiovascular:  Negative for chest pain, palpitations and leg swelling.   Gastrointestinal:  Negative for abdominal distention, abdominal pain, blood in stool, constipation, diarrhea, nausea and vomiting.   Endocrine: Negative for cold intolerance, heat intolerance and polyuria.   Genitourinary:  Negative for difficulty urinating, dysuria, frequency, hematuria and pelvic pain.   Musculoskeletal:  Negative for arthralgias, back pain, gait problem, joint swelling and myalgias.   Skin:  Positive for wound. Negative for color change, pallor and rash.   Neurological:  Positive for dizziness and weakness. Negative for seizures, syncope, speech difficulty, numbness and headaches.   Psychiatric/Behavioral:  Negative for agitation, confusion, hallucinations and sleep disturbance. The patient is not nervous/anxious.      Objective:     Vital Signs (Most Recent):  Temp: 98.6 °F (37 °C) (10/07/23 1104)  Pulse: 97 (10/07/23 1104)  Resp: 20 (10/07/23 1104)  BP: (!) 150/51 (10/07/23 1104)  SpO2: 98 % (10/07/23 1104) Vital Signs (24h Range):  Temp:  [97.1 °F (36.2 °C)-99 °F (37.2 °C)] 98.6 °F (37 °C)  Pulse:  [] 97  Resp:  [18-20] 20  SpO2:  [95 %-100 %] 98 %  BP: ()/(37-57) 150/51     Weight: 54.7 kg (120 lb 8 oz)  Body mass index is 19.45 kg/m².    Intake/Output Summary (Last 24 hours) at 10/7/2023 1122  Last data filed at 10/7/2023 0450  Gross per 24 hour   Intake 400 ml   Output 1500 ml   Net -1100 ml           Physical Exam  Constitutional:       General: She is not in acute distress.     Appearance: Normal appearance. She is  ill-appearing. She is not toxic-appearing or diaphoretic.   HENT:      Head: Normocephalic and atraumatic.      Right Ear: External ear normal.      Left Ear: External ear normal.      Nose: Nose normal. No congestion or rhinorrhea.      Mouth/Throat:      Mouth: Mucous membranes are dry.      Pharynx: Oropharynx is clear.   Eyes:      Extraocular Movements: Extraocular movements intact.      Conjunctiva/sclera: Conjunctivae normal.      Pupils: Pupils are equal, round, and reactive to light.   Neck:      Vascular: No carotid bruit.   Cardiovascular:      Rate and Rhythm: Normal rate and regular rhythm.      Pulses: Normal pulses.      Heart sounds: Normal heart sounds. No murmur heard.  Pulmonary:      Effort: Pulmonary effort is normal. No respiratory distress.      Breath sounds: Normal breath sounds. No wheezing, rhonchi or rales.   Abdominal:      General: Abdomen is flat. Bowel sounds are normal. There is no distension.      Palpations: Abdomen is soft.      Tenderness: There is no abdominal tenderness. There is no guarding.   Musculoskeletal:         General: No swelling or tenderness. Normal range of motion.      Cervical back: Normal range of motion and neck supple. No tenderness.      Right lower leg: No edema.      Left lower leg: No edema.   Lymphadenopathy:      Cervical: No cervical adenopathy.   Skin:     General: Skin is warm and dry.      Coloration: Skin is not jaundiced or pale.      Findings: Lesion present.   Neurological:      General: No focal deficit present.      Mental Status: She is alert and oriented to person, place, and time. Mental status is at baseline.      Cranial Nerves: No cranial nerve deficit.      Motor: Weakness present.      Coordination: Coordination abnormal.      Gait: Gait abnormal.   Psychiatric:         Mood and Affect: Mood normal.         Behavior: Behavior normal.         Thought Content: Thought content normal.         Judgment: Judgment normal.              Significant Labs: All pertinent labs within the past 24 hours have been reviewed.    Significant Imaging: I have reviewed all pertinent imaging results/findings within the past 24 hours.

## 2023-10-07 NOTE — PROGRESS NOTES
[facility]/Surg  [facility]  Progress Note    Patient Name: Zackery Purdy  MRN: [id number]  Patient Class: IP- Inpatient   Admission Date: 10/6/2023  Length of Stay: 1 days  Attending Physician: Saad Lilly MD  Primary Care Provider: Carol, Primary Doctor        Subjective:     Principal Problem:Severe sepsis        HPI:  The patient is a 89 year-old female sent from the nursing home for concern of dehydration. She has had diarrhea and vomiting all day at the nursing home. She was found to be hypotensive and was sent out of concern for sepsis. She was started on Rocephin and metronidazole. She voices that she has pain in her legs but otherwise no complaints. There's no chest pain or shortness of breath. No fever or chills noted. No dysuria but she does have evidence of a urinary tract infection. Lactate is 2.5. Of note she does take MgO2 at the NH daily and this could be contributing to her diarrhea.           Past Medical History:   Diagnosis Date    Anemia, unspecified      Anorexia      Blindness      Constipation      Coronary artery disease      GERD (gastroesophageal reflux disease)      Gout, unspecified      Heart failure, unspecified       hypertensive heart disease related    Hypertension      Insomnia      Intervertebral disc disorder with myelopathy, lumbar region      Mixed hyperlipidemia      Mixed hyperlipidemia      NSTEMI (non-ST elevated myocardial infarction)      Pruritus      Small bowel obstruction      Syndrome of inappropriate ADH (SIADH) secretion      Unspecified glaucoma       bilat.    Unspecified glaucoma        Overview/Hospital Course:  10/06/2023 patient resting comfortably tolerating antibiotics.  Cultures are pending.  She is very weak but thinks she can perform some physical therapy.  Right heel shows unstageable decubitus Dr. Lalit Jackson has been consulted.  10/07/2023 elderly female from the nursing home in 4 dehydration as well as  sepsis.  She is been found to have unstageable heel decubitus with surgery consult.  Today she is noted to be significantly anemic probably due to dilution as well as being chronically anemic.  Order anemia workup and continue her antibiotics and await the surgery consult.      Interval History:     Review of Systems   Constitutional:  Positive for appetite change, fatigue and fever. Negative for activity change, chills and diaphoresis.   HENT:  Negative for congestion, ear pain, rhinorrhea and sore throat.    Eyes:  Negative for pain, discharge and redness.   Respiratory:  Negative for apnea, cough, choking, chest tightness, shortness of breath and wheezing.    Cardiovascular:  Negative for chest pain, palpitations and leg swelling.   Gastrointestinal:  Negative for abdominal distention, abdominal pain, blood in stool, constipation, diarrhea, nausea and vomiting.   Endocrine: Negative for cold intolerance, heat intolerance and polyuria.   Genitourinary:  Negative for difficulty urinating, dysuria, frequency, hematuria and pelvic pain.   Musculoskeletal:  Negative for arthralgias, back pain, gait problem, joint swelling and myalgias.   Skin:  Positive for wound. Negative for color change, pallor and rash.   Neurological:  Positive for dizziness and weakness. Negative for seizures, syncope, speech difficulty, numbness and headaches.   Psychiatric/Behavioral:  Negative for agitation, confusion, hallucinations and sleep disturbance. The patient is not nervous/anxious.      Objective:     Vital Signs (Most Recent):  Temp: 98.6 °F (37 °C) (10/07/23 1104)  Pulse: 97 (10/07/23 1104)  Resp: 20 (10/07/23 1104)  BP: (!) 150/51 (10/07/23 1104)  SpO2: 98 % (10/07/23 1104) Vital Signs (24h Range):  Temp:  [97.1 °F (36.2 °C)-99 °F (37.2 °C)] 98.6 °F (37 °C)  Pulse:  [] 97  Resp:  [18-20] 20  SpO2:  [95 %-100 %] 98 %  BP: ()/(37-57) 150/51     Weight: 54.7 kg (120 lb 8 oz)  Body mass index is 19.45  kg/m².    Intake/Output Summary (Last 24 hours) at 10/7/2023 1122  Last data filed at 10/7/2023 0450  Gross per 24 hour   Intake 400 ml   Output 1500 ml   Net -1100 ml           Physical Exam  Constitutional:       General: She is not in acute distress.     Appearance: Normal appearance. She is ill-appearing. She is not toxic-appearing or diaphoretic.   HENT:      Head: Normocephalic and atraumatic.      Right Ear: External ear normal.      Left Ear: External ear normal.      Nose: Nose normal. No congestion or rhinorrhea.      Mouth/Throat:      Mouth: Mucous membranes are dry.      Pharynx: Oropharynx is clear.   Eyes:      Extraocular Movements: Extraocular movements intact.      Conjunctiva/sclera: Conjunctivae normal.      Pupils: Pupils are equal, round, and reactive to light.   Neck:      Vascular: No carotid bruit.   Cardiovascular:      Rate and Rhythm: Normal rate and regular rhythm.      Pulses: Normal pulses.      Heart sounds: Normal heart sounds. No murmur heard.  Pulmonary:      Effort: Pulmonary effort is normal. No respiratory distress.      Breath sounds: Normal breath sounds. No wheezing, rhonchi or rales.   Abdominal:      General: Abdomen is flat. Bowel sounds are normal. There is no distension.      Palpations: Abdomen is soft.      Tenderness: There is no abdominal tenderness. There is no guarding.   Musculoskeletal:         General: No swelling or tenderness. Normal range of motion.      Cervical back: Normal range of motion and neck supple. No tenderness.      Right lower leg: No edema.      Left lower leg: No edema.   Lymphadenopathy:      Cervical: No cervical adenopathy.   Skin:     General: Skin is warm and dry.      Coloration: Skin is not jaundiced or pale.      Findings: Lesion present.   Neurological:      General: No focal deficit present.      Mental Status: She is alert and oriented to person, place, and time. Mental status is at baseline.      Cranial Nerves: No cranial nerve  "deficit.      Motor: Weakness present.      Coordination: Coordination abnormal.      Gait: Gait abnormal.   Psychiatric:         Mood and Affect: Mood normal.         Behavior: Behavior normal.         Thought Content: Thought content normal.         Judgment: Judgment normal.             Significant Labs: All pertinent labs within the past 24 hours have been reviewed.    Significant Imaging: I have reviewed all pertinent imaging results/findings within the past 24 hours.      Assessment/Plan:      * Severe sepsis  This patient does have evidence of infective focus  My overall impression is sepsis.  Source: Urinary Tract  Antibiotics given-   Antibiotics (72h ago, onward)    Start     Stop Route Frequency Ordered    10/07/23 1950  piperacillin-tazobactam (ZOSYN) 4.5 g in dextrose 5 % in water (D5W) 100 mL IVPB (MB+)         -- IV Every 12 hours (non-standard times) 10/07/23 0825    10/06/23 0900  mupirocin 2 % ointment         10/11/23 0859 Nasl 2 times daily 10/06/23 0619        Latest lactate reviewed-  No results for input(s): "LACTATE" in the last 72 hours.  Organ dysfunction indicated by Acute kidney injury    Fluid challenge Ideal Body Weight- The patient's ideal body weight is Ideal body weight: 59.3 kg (130 lb 11.7 oz) which will be used to calculate fluid bolus of 30 ml/kg for treatment of septic shock.      Post- resuscitation assessment Yes Perfusion exam was performed within 6 hours of septic shock presentation after bolus shows Adequate tissue perfusion assessed by non-invasive monitoring       Will Not start Pressors- Levophed for MAP of 65      Decubitus ulcer of heel  Continue antibiotics.  Consult Dr. Paulino.      Dehydration  IV fluids      BETHANY (acute kidney injury)  Patient with acute kidney injury/acute renal failure likely due to pre-renal azotemia due to IVVD BETHANY is currently stable. Baseline creatinine unknown - Labs reviewed- Renal function/electrolytes with Estimated Creatinine Clearance: 14.9 " mL/min (A) (based on SCr of 2.12 mg/dL (H)). according to latest data. Monitor urine output and serial BMP and adjust therapy as needed. Avoid nephrotoxins and renally dose meds for GFR listed above.    Coronary artery disease involving coronary bypass graft of native heart    Continue current medications    Normocytic anemia  Anemia workup.  IV iron.        VTE Risk Mitigation (From admission, onward)         Ordered     enoxaparin injection 30 mg  Daily         10/06/23 0514     IP VTE HIGH RISK PATIENT  Once         10/06/23 0514                Discharge Planning   SANTINO: 10/11/2023     Code Status: DNR   Is the patient medically ready for discharge?:     Reason for patient still in hospital (select all that apply): Patient unstable  Discharge Plan A: Return to nursing home                  Tanner Moncada MD  Department of Hospital Medicine   [facility]/Surg

## 2023-10-07 NOTE — ASSESSMENT & PLAN NOTE
Patient with acute kidney injury/acute renal failure likely due to pre-renal azotemia due to IVVD BETHANY is currently stable. Baseline creatinine unknown - Labs reviewed- Renal function/electrolytes with Estimated Creatinine Clearance: 14.9 mL/min (A) (based on SCr of 2.12 mg/dL (H)). according to latest data. Monitor urine output and serial BMP and adjust therapy as needed. Avoid nephrotoxins and renally dose meds for GFR listed above.

## 2023-10-07 NOTE — PROGRESS NOTES
Pharmacist Renal Dose Adjustment Note    Zackery Purdy is a 89 y.o. female being treated with the medication Zosyn    Patient Data:    Vital Signs (Most Recent):  Temp: 97.1 °F (36.2 °C) (10/07/23 0723)  Pulse: 93 (10/07/23 0723)  Resp: 20 (10/07/23 0723)  BP: (!) 135/57 (10/07/23 0803)  SpO2: 98 % (10/07/23 0723) Vital Signs (72h Range):  Temp:  [97.1 °F (36.2 °C)-99 °F (37.2 °C)]   Pulse:  []   Resp:  [13-23]   BP: ()/(36-89)   SpO2:  [92 %-100 %]      Recent Labs   Lab 10/03/23  0700 10/06/23  0052 10/07/23  0426   CREATININE 1.31* 2.20* 2.12*     Serum creatinine: 2.12 mg/dL (H) 10/07/23 0426  Estimated creatinine clearance: 14.9 mL/min (A)    Medication:zosyn dose: 4.5gm frequency q8h will be changed to medication:Zosyn dose:4.5gm frequency:q12h    Pharmacist's Name: Mauricio Rowland  Pharmacist's Extension: [phone]

## 2023-10-07 NOTE — PLAN OF CARE
Problem: Adult Inpatient Plan of Care  Goal: Plan of Care Review  Outcome: Ongoing, Progressing  Goal: Patient-Specific Goal (Individualized)  Outcome: Ongoing, Progressing  Goal: Absence of Hospital-Acquired Illness or Injury  Outcome: Ongoing, Progressing  Goal: Optimal Comfort and Wellbeing  Outcome: Ongoing, Progressing  Goal: Readiness for Transition of Care  Outcome: Ongoing, Progressing     Problem: Adjustment to Illness (Sepsis/Septic Shock)  Goal: Optimal Coping  Outcome: Ongoing, Progressing     Problem: Bleeding (Sepsis/Septic Shock)  Goal: Absence of Bleeding  Outcome: Ongoing, Progressing     Problem: Glycemic Control Impaired (Sepsis/Septic Shock)  Goal: Blood Glucose Level Within Desired Range  Outcome: Ongoing, Progressing     Problem: Infection Progression (Sepsis/Septic Shock)  Goal: Absence of Infection Signs and Symptoms  Outcome: Ongoing, Progressing     Problem: Nutrition Impaired (Sepsis/Septic Shock)  Goal: Optimal Nutrition Intake  Outcome: Ongoing, Progressing     Problem: Fluid and Electrolyte Imbalance (Acute Kidney Injury/Impairment)  Goal: Fluid and Electrolyte Balance  Outcome: Ongoing, Progressing     Problem: Oral Intake Inadequate (Acute Kidney Injury/Impairment)  Goal: Optimal Nutrition Intake  Outcome: Ongoing, Progressing     Problem: Renal Function Impairment (Acute Kidney Injury/Impairment)  Goal: Effective Renal Function  Outcome: Ongoing, Progressing     Problem: UTI (Urinary Tract Infection)  Goal: Improved Infection Symptoms  Outcome: Ongoing, Progressing     Problem: Diarrhea  Goal: Fluid and Electrolyte Balance  Outcome: Ongoing, Progressing     Problem: Fall Injury Risk  Goal: Absence of Fall and Fall-Related Injury  Outcome: Ongoing, Progressing     Problem: Skin Injury Risk Increased  Goal: Skin Health and Integrity  Outcome: Ongoing, Progressing     Problem: Infection  Goal: Absence of Infection Signs and Symptoms  Outcome: Ongoing, Progressing     Problem: Impaired  Wound Healing  Goal: Optimal Wound Healing  Outcome: Ongoing, Progressing

## 2023-10-07 NOTE — PT/OT/SLP PROGRESS
Physical Therapy Treatment    Patient Name:  Zackery Purdy   MRN:  [id number]    Recommendations:     Discharge Recommendations: nursing facility, skilled  Discharge Equipment Recommendations: wheelchair, hospital bed  Barriers to discharge: None    Assessment:     Zackery Purdy is a 89 y.o. female admitted with a medical diagnosis of Severe sepsis.  She presents with the following impairments/functional limitations: weakness, impaired endurance, impaired functional mobility.    Patient eager to sit up EOB to eat her breakfast this morning. Requiring mod/max A to get EOB and sat up for 15 minutes to eat with SBA, min A needed at times to correct posture as she fatigued.  By end of this she was tired and slouching and ready to return to supine.  Required max A for sit to supine and max A for scooting.    Rehab Prognosis: Good; patient would benefit from acute skilled PT services to address these deficits and reach maximum level of function.    Recent Surgery: * No surgery found *      Plan:     During this hospitalization, patient to be seen 5 x/week (5-6x weekly/1-2x daily) to address the identified rehab impairments via therapeutic activities, therapeutic exercises and progress toward the following goals:    Plan of Care Expires:  11/06/23    Subjective     Chief Complaint: weakness  Patient/Family Comments/goals: improve strength  Pain/Comfort:  Pain Rating 1: 4/10  Location 1: leg  Pain Addressed 1: Reposition      Objective:     Communicated with nurse prior to session.  Patient found HOB elevated with   upon PT entry to room.     General Precautions: Standard, fall  Orthopedic Precautions: N/A  Braces: N/A  Respiratory Status: Room air     Functional Mobility:  Bed Mobility:     Scooting: maximal assistance  Supine to Sit: moderate assistance  Sit to Supine: maximal assistance  Balance: sitting SBA-min A          Patient left left sidelying with call button in reach and CNA present..    GOALS:    Multidisciplinary Problems       Physical Therapy Goals          Problem: Physical Therapy    Goal Priority Disciplines Outcome Goal Variances Interventions   Physical Therapy Goal     PT, PT/OT Ongoing, Progressing     Description: Goals to be met by: discharge     Patient will increase functional independence with mobility by performin. Supine to sit with MInimal Assistance  2. Sit to stand transfer with Minimal Assistance  3. Bed to chair transfer with Minimal Assistance using No Assistive Device                         Time Tracking:     PT Received On: 10/07/23  PT Start Time: 820     PT Stop Time: 45  PT Total Time (min): 25 min     Billable Minutes: Therapeutic Activity 25    Treatment Type: Treatment  PT/PTA: PT           10/07/2023

## 2023-10-08 NOTE — SUBJECTIVE & OBJECTIVE
Interval History:     Review of Systems   Constitutional:  Positive for appetite change, fatigue and fever. Negative for activity change, chills and diaphoresis.   HENT:  Negative for congestion, ear pain, rhinorrhea and sore throat.    Eyes:  Negative for pain, discharge and redness.   Respiratory:  Negative for apnea, cough, choking, chest tightness, shortness of breath and wheezing.    Cardiovascular:  Negative for chest pain, palpitations and leg swelling.   Gastrointestinal:  Negative for abdominal distention, abdominal pain, blood in stool, constipation, diarrhea, nausea and vomiting.   Endocrine: Negative for cold intolerance, heat intolerance and polyuria.   Genitourinary:  Negative for difficulty urinating, dysuria, frequency, hematuria and pelvic pain.   Musculoskeletal:  Negative for arthralgias, back pain, gait problem, joint swelling and myalgias.   Skin:  Positive for wound. Negative for color change, pallor and rash.   Neurological:  Positive for dizziness and weakness. Negative for seizures, syncope, speech difficulty, numbness and headaches.   Psychiatric/Behavioral:  Negative for agitation, confusion, hallucinations and sleep disturbance. The patient is not nervous/anxious.      Objective:     Vital Signs (Most Recent):  Temp: 98.1 °F (36.7 °C) (10/08/23 0701)  Pulse: 72 (10/08/23 0728)  Resp: 18 (10/08/23 0728)  BP: (!) 113/41 (10/08/23 0701)  SpO2: 98 % (10/08/23 0728) Vital Signs (24h Range):  Temp:  [97.3 °F (36.3 °C)-98.6 °F (37 °C)] 98.1 °F (36.7 °C)  Pulse:  [71-99] 72  Resp:  [18-20] 18  SpO2:  [96 %-100 %] 98 %  BP: (104-150)/(41-78) 113/41     Weight: 54.7 kg (120 lb 8 oz)  Body mass index is 19.45 kg/m².    Intake/Output Summary (Last 24 hours) at 10/8/2023 1021  Last data filed at 10/8/2023 0611  Gross per 24 hour   Intake 1473.75 ml   Output 1100 ml   Net 373.75 ml           Physical Exam  Constitutional:       General: She is not in acute distress.     Appearance: Normal appearance.  She is ill-appearing. She is not toxic-appearing or diaphoretic.   HENT:      Head: Normocephalic and atraumatic.      Right Ear: External ear normal.      Left Ear: External ear normal.      Nose: Nose normal. No congestion or rhinorrhea.      Mouth/Throat:      Mouth: Mucous membranes are dry.      Pharynx: Oropharynx is clear.   Eyes:      Extraocular Movements: Extraocular movements intact.      Conjunctiva/sclera: Conjunctivae normal.      Pupils: Pupils are equal, round, and reactive to light.   Neck:      Vascular: No carotid bruit.   Cardiovascular:      Rate and Rhythm: Normal rate and regular rhythm.      Pulses: Normal pulses.      Heart sounds: Normal heart sounds. No murmur heard.  Pulmonary:      Effort: Pulmonary effort is normal. No respiratory distress.      Breath sounds: Normal breath sounds. No wheezing, rhonchi or rales.   Abdominal:      General: Abdomen is flat. Bowel sounds are normal. There is no distension.      Palpations: Abdomen is soft.      Tenderness: There is no abdominal tenderness. There is no guarding.   Musculoskeletal:         General: No swelling or tenderness. Normal range of motion.      Cervical back: Normal range of motion and neck supple. No tenderness.      Right lower leg: No edema.      Left lower leg: No edema.   Lymphadenopathy:      Cervical: No cervical adenopathy.   Skin:     General: Skin is warm and dry.      Coloration: Skin is not jaundiced or pale.      Findings: Lesion present.   Neurological:      General: No focal deficit present.      Mental Status: She is alert and oriented to person, place, and time. Mental status is at baseline.      Cranial Nerves: No cranial nerve deficit.      Motor: Weakness present.      Coordination: Coordination abnormal.      Gait: Gait abnormal.   Psychiatric:         Mood and Affect: Mood normal.         Behavior: Behavior normal.         Thought Content: Thought content normal.         Judgment: Judgment normal.              Significant Labs: All pertinent labs within the past 24 hours have been reviewed.    Significant Imaging: I have reviewed all pertinent imaging results/findings within the past 24 hours.

## 2023-10-08 NOTE — NURSING
PATIENT RETURN TO ROOM VIA BED IN STABLE COND . PT. WAS GIVEN CARDIAZEM IN OR HEARTRATE 80-90. FAMILY AT BEDSIDE

## 2023-10-08 NOTE — PLAN OF CARE
PATIENT STABLE AND RESTING COMFORTABLY. PATIENT STATES NO OP SITE PAIN. PATIENT MEETS CRITERIA FOR TRANSFER.

## 2023-10-08 NOTE — PROGRESS NOTES
[facility]/Surg  [place] [facility] Medicine  Progress Note    Patient Name: Zackery Purdy  MRN: [id number]  Patient Class: IP- Inpatient   Admission Date: 10/6/2023  Length of Stay: 2 days  Attending Physician: Saad Lilly MD  Primary Care Provider: Carol, Primary Doctor        Subjective:     Principal Problem:Severe sepsis        HPI:  The patient is a 89 year-old female sent from the nursing home for concern of dehydration. She has had diarrhea and vomiting all day at the nursing home. She was found to be hypotensive and was sent out of concern for sepsis. She was started on Rocephin and metronidazole. She voices that she has pain in her legs but otherwise no complaints. There's no chest pain or shortness of breath. No fever or chills noted. No dysuria but she does have evidence of a urinary tract infection. Lactate is 2.5. Of note she does take MgO2 at the NH daily and this could be contributing to her diarrhea.           Past Medical History:   Diagnosis Date    Anemia, unspecified      Anorexia      Blindness      Constipation      Coronary artery disease      GERD (gastroesophageal reflux disease)      Gout, unspecified      Heart failure, unspecified       hypertensive heart disease related    Hypertension      Insomnia      Intervertebral disc disorder with myelopathy, lumbar region      Mixed hyperlipidemia      Mixed hyperlipidemia      NSTEMI (non-ST elevated myocardial infarction)      Pruritus      Small bowel obstruction      Syndrome of inappropriate ADH (SIADH) secretion      Unspecified glaucoma       bilat.    Unspecified glaucoma        Overview/Hospital Course:  10/06/2023 patient resting comfortably tolerating antibiotics.  Cultures are pending.  She is very weak but thinks she can perform some physical therapy.  Right heel shows unstageable decubitus Dr. Lalit Jackson has been consulted.  10/07/2023 elderly female from the nursing home in 4 dehydration as well as  sepsis.  She is been found to have unstageable heel decubitus with surgery consult.  Today she is noted to be significantly anemic probably due to dilution as well as being chronically anemic.  Order anemia workup and continue her antibiotics and await the surgery consult.  10/08/2023 patient awaiting surgical debridement of her heel decubitus.  She is responding well to treatment for sepsis.  Anemia is slightly improved.      Interval History:     Review of Systems   Constitutional:  Positive for appetite change, fatigue and fever. Negative for activity change, chills and diaphoresis.   HENT:  Negative for congestion, ear pain, rhinorrhea and sore throat.    Eyes:  Negative for pain, discharge and redness.   Respiratory:  Negative for apnea, cough, choking, chest tightness, shortness of breath and wheezing.    Cardiovascular:  Negative for chest pain, palpitations and leg swelling.   Gastrointestinal:  Negative for abdominal distention, abdominal pain, blood in stool, constipation, diarrhea, nausea and vomiting.   Endocrine: Negative for cold intolerance, heat intolerance and polyuria.   Genitourinary:  Negative for difficulty urinating, dysuria, frequency, hematuria and pelvic pain.   Musculoskeletal:  Negative for arthralgias, back pain, gait problem, joint swelling and myalgias.   Skin:  Positive for wound. Negative for color change, pallor and rash.   Neurological:  Positive for dizziness and weakness. Negative for seizures, syncope, speech difficulty, numbness and headaches.   Psychiatric/Behavioral:  Negative for agitation, confusion, hallucinations and sleep disturbance. The patient is not nervous/anxious.      Objective:     Vital Signs (Most Recent):  Temp: 98.1 °F (36.7 °C) (10/08/23 0701)  Pulse: 72 (10/08/23 0728)  Resp: 18 (10/08/23 0728)  BP: (!) 113/41 (10/08/23 0701)  SpO2: 98 % (10/08/23 0728) Vital Signs (24h Range):  Temp:  [97.3 °F (36.3 °C)-98.6 °F (37 °C)] 98.1 °F (36.7 °C)  Pulse:  [71-99]  72  Resp:  [18-20] 18  SpO2:  [96 %-100 %] 98 %  BP: (104-150)/(41-78) 113/41     Weight: 54.7 kg (120 lb 8 oz)  Body mass index is 19.45 kg/m².    Intake/Output Summary (Last 24 hours) at 10/8/2023 1021  Last data filed at 10/8/2023 0611  Gross per 24 hour   Intake 1473.75 ml   Output 1100 ml   Net 373.75 ml           Physical Exam  Constitutional:       General: She is not in acute distress.     Appearance: Normal appearance. She is ill-appearing. She is not toxic-appearing or diaphoretic.   HENT:      Head: Normocephalic and atraumatic.      Right Ear: External ear normal.      Left Ear: External ear normal.      Nose: Nose normal. No congestion or rhinorrhea.      Mouth/Throat:      Mouth: Mucous membranes are dry.      Pharynx: Oropharynx is clear.   Eyes:      Extraocular Movements: Extraocular movements intact.      Conjunctiva/sclera: Conjunctivae normal.      Pupils: Pupils are equal, round, and reactive to light.   Neck:      Vascular: No carotid bruit.   Cardiovascular:      Rate and Rhythm: Normal rate and regular rhythm.      Pulses: Normal pulses.      Heart sounds: Normal heart sounds. No murmur heard.  Pulmonary:      Effort: Pulmonary effort is normal. No respiratory distress.      Breath sounds: Normal breath sounds. No wheezing, rhonchi or rales.   Abdominal:      General: Abdomen is flat. Bowel sounds are normal. There is no distension.      Palpations: Abdomen is soft.      Tenderness: There is no abdominal tenderness. There is no guarding.   Musculoskeletal:         General: No swelling or tenderness. Normal range of motion.      Cervical back: Normal range of motion and neck supple. No tenderness.      Right lower leg: No edema.      Left lower leg: No edema.   Lymphadenopathy:      Cervical: No cervical adenopathy.   Skin:     General: Skin is warm and dry.      Coloration: Skin is not jaundiced or pale.      Findings: Lesion present.   Neurological:      General: No focal deficit present.     "  Mental Status: She is alert and oriented to person, place, and time. Mental status is at baseline.      Cranial Nerves: No cranial nerve deficit.      Motor: Weakness present.      Coordination: Coordination abnormal.      Gait: Gait abnormal.   Psychiatric:         Mood and Affect: Mood normal.         Behavior: Behavior normal.         Thought Content: Thought content normal.         Judgment: Judgment normal.             Significant Labs: All pertinent labs within the past 24 hours have been reviewed.    Significant Imaging: I have reviewed all pertinent imaging results/findings within the past 24 hours.      Assessment/Plan:      * Severe sepsis  This patient does have evidence of infective focus  My overall impression is sepsis.  Source: Urinary Tract  Antibiotics given-   Antibiotics (72h ago, onward)    Start     Stop Route Frequency Ordered    10/07/23 1950  piperacillin-tazobactam (ZOSYN) 4.5 g in dextrose 5 % in water (D5W) 100 mL IVPB (MB+)         -- IV Every 12 hours (non-standard times) 10/07/23 0825    10/06/23 0900  mupirocin 2 % ointment         10/11/23 0859 Nasl 2 times daily 10/06/23 0619        Latest lactate reviewed-  No results for input(s): "LACTATE" in the last 72 hours.  Organ dysfunction indicated by Acute kidney injury    Fluid challenge Ideal Body Weight- The patient's ideal body weight is Ideal body weight: 59.3 kg (130 lb 11.7 oz) which will be used to calculate fluid bolus of 30 ml/kg for treatment of septic shock.      Post- resuscitation assessment Yes Perfusion exam was performed within 6 hours of septic shock presentation after bolus shows Adequate tissue perfusion assessed by non-invasive monitoring       Will Not start Pressors- Levophed for MAP of 65      Decubitus ulcer of heel  Continue antibiotics.  Consult Dr. Paulino.      Dehydration  IV fluids      BETHANY (acute kidney injury)  Patient with acute kidney injury/acute renal failure likely due to pre-renal azotemia due to IVVD " BETHANY is currently stable. Baseline creatinine unknown - Labs reviewed- Renal function/electrolytes with Estimated Creatinine Clearance: 19.5 mL/min (A) (based on SCr of 1.62 mg/dL (H)). according to latest data. Monitor urine output and serial BMP and adjust therapy as needed. Avoid nephrotoxins and renally dose meds for GFR listed above.    Coronary artery disease involving coronary bypass graft of native heart    Continue current medications    Normocytic anemia  Anemia workup.  IV iron.        VTE Risk Mitigation (From admission, onward)         Ordered     enoxaparin injection 30 mg  Daily         10/06/23 0514     IP VTE HIGH RISK PATIENT  Once         10/06/23 0514                Discharge Planning   SANTINO: 10/11/2023     Code Status: DNR   Is the patient medically ready for discharge?:     Reason for patient still in hospital (select all that apply): Patient unstable  Discharge Plan A: Return to nursing home                  Tanner Moncada MD  Department of Hospital Medicine   [facility]

## 2023-10-08 NOTE — ANESTHESIA POSTPROCEDURE EVALUATION
Anesthesia Post Evaluation    Patient: Zackery Purdy    Procedure(s) Performed: Procedure(s) (LRB):  DEBRIDEMENT, WOUND (Right)    Final Anesthesia Type: MAC      Patient location during evaluation: floor  Patient participation: Yes- Able to Participate  Level of consciousness: awake and alert, awake and oriented  Post-procedure vital signs: reviewed and stable  Pain management: adequate  Airway patency: patent    PONV status at discharge: No PONV  Anesthetic complications: no      Cardiovascular status: blood pressure returned to baseline  Respiratory status: unassisted, room air and spontaneous ventilation  Hydration status: euvolemic  Follow-up not needed.          Vitals Value Taken Time   /45 10/08/23 1116   Temp 37 °C (98.6 °F) 10/08/23 1116   Pulse 75 10/08/23 1116   Resp 20 10/08/23 1229   SpO2 99 % 10/08/23 1116         No case tracking events are documented in the log.      Pain/Chris Score: Pain Rating Prior to Med Admin: 9 (10/8/2023 12:29 PM)  Pain Rating Post Med Admin: 3 (10/8/2023  1:29 PM)

## 2023-10-08 NOTE — INTERVAL H&P NOTE
The patient has been examined and the H&P has been reviewed:    I concur with the findings and no changes have occurred since H&P was written.    Surgery risks, benefits and alternative options discussed and understood by patient/family.          Active Hospital Problems    Diagnosis  POA    *Severe sepsis [A41.9, R65.20]  Unknown    BETHANY (acute kidney injury) [N17.9]  Unknown    Hypotension due to hypovolemia [I95.89, E86.1]  Unknown    Gastroenteritis [K52.9]  Unknown    Diarrhea [R19.7]  Unknown    Dehydration [E86.0]  Unknown    Acute cystitis without hematuria [N30.00]  Unknown    Decubitus ulcer of heel [L89.609]  Unknown    Coronary artery disease involving coronary bypass graft of native heart [I25.810]  Yes    Normocytic anemia [D64.9]  Yes      Resolved Hospital Problems   No resolved problems to display.

## 2023-10-08 NOTE — PLAN OF CARE
Problem: Adult Inpatient Plan of Care  Goal: Plan of Care Review  Outcome: Ongoing, Progressing  Goal: Patient-Specific Goal (Individualized)  Outcome: Ongoing, Progressing  Goal: Absence of Hospital-Acquired Illness or Injury  Outcome: Ongoing, Progressing  Goal: Optimal Comfort and Wellbeing  Outcome: Ongoing, Progressing  Goal: Readiness for Transition of Care  Outcome: Ongoing, Progressing     Problem: Adjustment to Illness (Sepsis/Septic Shock)  Goal: Optimal Coping  Outcome: Ongoing, Progressing     Problem: Bleeding (Sepsis/Septic Shock)  Goal: Absence of Bleeding  Outcome: Ongoing, Progressing     Problem: Glycemic Control Impaired (Sepsis/Septic Shock)  Goal: Blood Glucose Level Within Desired Range  Outcome: Ongoing, Progressing     Problem: Fluid and Electrolyte Imbalance (Acute Kidney Injury/Impairment)  Goal: Fluid and Electrolyte Balance  Outcome: Ongoing, Progressing     Problem: Oral Intake Inadequate (Acute Kidney Injury/Impairment)  Goal: Optimal Nutrition Intake  Outcome: Ongoing, Progressing     Problem: Renal Function Impairment (Acute Kidney Injury/Impairment)  Goal: Effective Renal Function  Outcome: Ongoing, Progressing     Problem: UTI (Urinary Tract Infection)  Goal: Improved Infection Symptoms  Outcome: Ongoing, Progressing     Problem: Diarrhea  Goal: Fluid and Electrolyte Balance  Outcome: Ongoing, Progressing     Problem: Fall Injury Risk  Goal: Absence of Fall and Fall-Related Injury  Outcome: Ongoing, Progressing     Problem: Skin Injury Risk Increased  Goal: Skin Health and Integrity  Outcome: Ongoing, Progressing     Problem: Impaired Wound Healing  Goal: Optimal Wound Healing  Outcome: Ongoing, Progressing     Problem: Infection  Goal: Absence of Infection Signs and Symptoms  Outcome: Ongoing, Progressing

## 2023-10-08 NOTE — ASSESSMENT & PLAN NOTE
Patient with acute kidney injury/acute renal failure likely due to pre-renal azotemia due to IVVD BETHANY is currently stable. Baseline creatinine unknown - Labs reviewed- Renal function/electrolytes with Estimated Creatinine Clearance: 19.5 mL/min (A) (based on SCr of 1.62 mg/dL (H)). according to latest data. Monitor urine output and serial BMP and adjust therapy as needed. Avoid nephrotoxins and renally dose meds for GFR listed above.

## 2023-10-08 NOTE — CONSULTS
Patient is a 89 yr old female sent from [facility] for concern for dehydration. She has had diarrhea and vomiting all day at the nursing home. She was found to be hypotensive and was sent out of concern for sepsis.  Initial blood pressure by EMS was a 60 systolic.  There has been no fever or chills. She was started on Rocephin and metronidazole. She voices that she has pain in her legs but otherwise no complaints. No dysuria but she does have evidence of a urinary tract infection. Lactate is 2.5. Of note she does take MgO2 at the Nursing Home daily and that could be contributing to her diarrhea.  I was consulted for evaluation of necrotic right heel      Review of patient's allergies indicates   Allergen   Amlodipine reaction on patient is itching.     Past Medical History   Diagnosis  Anemia, Unspecified  Anorexia   Blindness/Unspecified Glaucoma  Constipation  Coronary Artery Disease/NSTEMI (non-ST elevated myocardial infarction)  GERD(gastroesophaegeal reflux disease)  Gout, unspecified  Heart failure, unspecified/Hypertensive heart disease related/Hypertensive  Insomnia  Intervertebral disc disorder with myelopathy, lumbar region  Mixed Hyperlipidemia  Pruritus  Small Bowel Obstruction  Syndrome of inappropriate ADH(SIADH) secretion      Past Surgical History   Procedure  Appendectomy - 20 +yrs ago  Cholecystectomy - 15+ yrs ago  Coronary Artery Bypass Graft /Open Heart Surgery- 10 yrs ago   Cataract Surgery - Bilateral - 10 yrs ago   Colonoscopy -5 yrs ago  No EGD  Angiogram, Stress test, Echocardiogram - 11 yrs ago - Dr. Layne     Immunizations  No Covid 19 vaccine  No Hepatitis vaccine  No Shingles vaccine  Pneumonia vaccine -  Flu vaccine -  No DTAP/TETNUS Vaccine    Family History  Mother  20+ yrs ago from unknown etiology   Father  20+ yrs ago from unknown etiology     7 yrs ago from Natural Causes    Social History  Former Smoker - stopped about 10 yrs ago, but had  "smoked for about 15 yrs about 1 PPD.  Former Alcohol drinker -Occasional (Parties and Holidays) - Only Beer and maybe 1 or 2 at the most  No Drugs  No  Service  No assisted Time  Rehab - 20 days in [place] due to Confusion and work on leg strength due to infection and weakness. Needed Assistance to ambulate   50+ yrs,  AB0, Never    Employed as Bank Employee and Employee at a [employer]  Retired about 10 yrs ago  Resides at [facility]  PCP is Dr. Maurice Costello    Review of Systems  Patient is positive for nausea and vomiting  Patient is positive for leg pain   Patient is positive for open wound to the Right Heel   Patient is positive for open wound to Buttocks and Sacrum    Objective   Vital Signs 10/06/2023  Temp is 97.5F(36.4C)  Pulse is 76  Respirations is 18  BP is 136/63  Spo2 is 97%  Height is 5' 6" (167.6cm)  Weight is 54.7 kg(120 lb 8 oz)  Body Mass Index is 19.45kg/m2    Physical Exam  Patient is alert, cooperative, with no distress and appears stated age.   Head  is Atraumatic   Pupils are round and equal and are reactive to light and positive for glasses  Both ears are normal TM's and external canals  Lips, mucosa, and tongue normal, Top Partial plate   Neck is Supple with no adenopathy or carotid bruit.   Clear to auscultate bilateral lungs  Rate and Rhythm are normal  Soft, non tender, bowel sounds active all four quadrants , no masses or organomegaly   Extremities normal, atraumatic, no cyanosis or edema.. Mild excoriations to heels bilaterally and buttocks  Skin color, texture, turgor normal       Laboratory Findings  CBC - 10/06/2023  14.48>        12.2/35.8     < 394     91.1/31.0      CMP       143/4.4        110/15       74.0/2.20       < 150     9.8/2.90        Assessment   Patient is a 89 yr old female sent from [facility] for concern for dehydration. She has had diarrhea and vomiting all day at the nursing home. She was found to be " hypotensive and was sent out of concern for sepsis.  Initial blood pressure by EMS was a 60 systolic.  There has been no fever or chills. She was started on Rocephin and metronidazole. She voices that she has pain in her legs but otherwise no complaints.     No dysuria but she does have evidence of a urinary tract infection.  Urinalysis shows cloudy appearing urine with positive blood in the urine and moderate leukocytes 52-99 WBCs per high-powered field, cultures were positive for Klebsiella pneumonia greater than 100,000 colony-forming units  White count was 14.8 with a hemoglobin of 12 hematocrit 35.8 and a platelet count of 394.  CO2 on CMP was 15 pro BNP was 1760  Lactate is 2.5.  Patient is receiving Zosyn for antibiotic coverage.  Stool PCRs are pending    Of note she does take MgO2 at the Nursing Home daily and that could be contributing to her diarrhea. Patient also has Rt Heel wound and Open Sacrum Wound .  I was consulted for evaluation of necrotic right heel with debride.    Plan  IV Fluids  IV Antibiotics   I&D Right Heel Wound with excisional debridement washout  Check stool PCR

## 2023-10-08 NOTE — OP NOTE
[facility]  [facility]      Date of Procedure: 10/8/2023     Procedure: Procedure(s) (LRB):  DEBRIDEMENT, WOUND (Right)     Surgeon(s) and Role:     * Jerod Toth MD - Primary    Assisting Surgeon: None    Pre-Operative Diagnosis: Pressure injury of skin of right heel, unspecified injury stage [L89.619]    Post-Operative Diagnosis: Post-Op Diagnosis Codes:     * Pressure injury of skin of right heel, unspecified injury stage [L89.619]    Anesthesia: Local MAC    Operative Findings (including complications, if any):  Patient is a 89-year-old  female with a history of a necrotic wound on the right heel that required excisional debridement.  Patient underwent excisional debridement with a 15 blade scalpel skin subcutaneous tissue down to fascia.  Patient had hemostasis Bovie cautery local anesthetic was injected.  Patient had wet-to-dry saline dressings applied.  The black eschar was removed it was about 4-5 cm in diameter circular.  An excisional debridement was done.  It was done with a 15 blade scalpel.  It was hemostasis with Bovie cautery.  Skin subcutaneous tissue and fascia was removed.        Description of Technical Procedures:  Noted above       Significant Surgical Tasks Conducted by the Assistant(s), if Applicable:  None       Estimated Blood Loss (EBL): * No values recorded between 10/8/2023  3:49 PM and 10/8/2023  3:54 PM *           Implants: * No implants in log *    Specimens:   Specimen (24h ago, onward)      None                    Condition: Good    Disposition: PACU - hemodynamically stable.    Attestation: I was present and scrubbed for the entire procedure.    Discharge Note    OUTCOME: Patient tolerated treatment/procedure well without complication and is now ready for discharge.          DISPOSITION: Home or Self Care    FINAL DIAGNOSIS:  Severe sepsis excisional debridement right heel wound    FOLLOWUP: In clinic 1 week    DISCHARGE  INSTRUCTIONS:  No discharge procedures on file.

## 2023-10-08 NOTE — ANESTHESIA PREPROCEDURE EVALUATION
10/08/2023  Zackery Purdy is a 89 y.o., female.      Pre-op Assessment          Review of Systems  Anesthesia Hx:  No problems with previous Anesthesia    Social:  Former Smoker    Hematology/Oncology:         -- Anemia:   Cardiovascular:   Exercise tolerance: poor Hypertension Past MI CAD  CABG/stent  PVD hyperlipidemia CABG 3 VES   Pulmonary:   Shortness of breath    Renal/:   Chronic Renal Disease, CKD    Hepatic/GI:   GERD    Musculoskeletal:   Arthritis     Psych:   anxiety depression          Physical Exam  General: Well nourished, Cooperative, Alert and Oriented    Airway:  Mallampati: II / II  Mouth Opening: Normal  TM Distance: Normal  Tongue: Normal  Neck ROM: Normal ROM    Dental:  Intact, Partial Dentures        Anesthesia Plan  Type of Anesthesia, risks & benefits discussed:    Anesthesia Type: Gen ETT, MAC  Intra-op Monitoring Plan: Standard ASA Monitors  Post Op Pain Control Plan: multimodal analgesia  Induction:  IV  Airway Plan: Direct  Informed Consent: Informed consent signed with the Patient and all parties understand the risks and agree with anesthesia plan.  All questions answered. Patient consented to blood products? Yes  ASA Score: 4 Emergent  Day of Surgery Review of History & Physical: H&P Update referred to the surgeon/provider.I have interviewed and examined the patient. I have reviewed the patient's H&P dated: There are no significant changes.     Ready For Surgery From Anesthesia Perspective.     .

## 2023-10-08 NOTE — H&P (VIEW-ONLY)
Patient is a 89 yr old female sent from [facility] for concern for dehydration. She has had diarrhea and vomiting all day at the nursing home. She was found to be hypotensive and was sent out of concern for sepsis.  Initial blood pressure by EMS was a 60 systolic.  There has been no fever or chills. She was started on Rocephin and metronidazole. She voices that she has pain in her legs but otherwise no complaints. No dysuria but she does have evidence of a urinary tract infection. Lactate is 2.5. Of note she does take MgO2 at the Nursing Home daily and that could be contributing to her diarrhea.  I was consulted for evaluation of necrotic right heel      Review of patient's allergies indicates   Allergen   Amlodipine reaction on patient is itching.     Past Medical History   Diagnosis  Anemia, Unspecified  Anorexia   Blindness/Unspecified Glaucoma  Constipation  Coronary Artery Disease/NSTEMI (non-ST elevated myocardial infarction)  GERD(gastroesophaegeal reflux disease)  Gout, unspecified  Heart failure, unspecified/Hypertensive heart disease related/Hypertensive  Insomnia  Intervertebral disc disorder with myelopathy, lumbar region  Mixed Hyperlipidemia  Pruritus  Small Bowel Obstruction  Syndrome of inappropriate ADH(SIADH) secretion      Past Surgical History   Procedure  Appendectomy - 20 +yrs ago  Cholecystectomy - 15+ yrs ago  Coronary Artery Bypass Graft /Open Heart Surgery- 10 yrs ago   Cataract Surgery - Bilateral - 10 yrs ago   Colonoscopy -5 yrs ago  No EGD  Angiogram, Stress test, Echocardiogram - 11 yrs ago - Dr. Layne     Immunizations  No Covid 19 vaccine  No Hepatitis vaccine  No Shingles vaccine  Pneumonia vaccine -  Flu vaccine -  No DTAP/TETNUS Vaccine    Family History  Mother  20+ yrs ago from unknown etiology   Father  20+ yrs ago from unknown etiology     7 yrs ago from Natural Causes    Social History  Former Smoker - stopped about 10 yrs ago, but had  "smoked for about 15 yrs about 1 PPD.  Former Alcohol drinker -Occasional (Parties and Holidays) - Only Beer and maybe 1 or 2 at the most  No Drugs  No  Service  No retirement Time  Rehab - 20 days in [place] due to Confusion and work on leg strength due to infection and weakness. Needed Assistance to ambulate   50+ yrs,  AB0, Never    Employed as Bank Employee and Employee at a [employer]  Retired about 10 yrs ago  Resides at [facility]  PCP is Dr. Maurice Costello    Review of Systems  Patient is positive for nausea and vomiting  Patient is positive for leg pain   Patient is positive for open wound to the Right Heel   Patient is positive for open wound to Buttocks and Sacrum    Objective   Vital Signs 10/06/2023  Temp is 97.5F(36.4C)  Pulse is 76  Respirations is 18  BP is 136/63  Spo2 is 97%  Height is 5' 6" (167.6cm)  Weight is 54.7 kg(120 lb 8 oz)  Body Mass Index is 19.45kg/m2    Physical Exam  Patient is alert, cooperative, with no distress and appears stated age.   Head  is Atraumatic   Pupils are round and equal and are reactive to light and positive for glasses  Both ears are normal TM's and external canals  Lips, mucosa, and tongue normal, Top Partial plate   Neck is Supple with no adenopathy or carotid bruit.   Clear to auscultate bilateral lungs  Rate and Rhythm are normal  Soft, non tender, bowel sounds active all four quadrants , no masses or organomegaly   Extremities normal, atraumatic, no cyanosis or edema.. Mild excoriations to heels bilaterally and buttocks  Skin color, texture, turgor normal       Laboratory Findings  CBC - 10/06/2023  14.48>        12.2/35.8     < 394     91.1/31.0      CMP       143/4.4        110/15       74.0/2.20       < 150     9.8/2.90        Assessment   Patient is a 89 yr old female sent from [facility] for concern for dehydration. She has had diarrhea and vomiting all day at the nursing home. She was found to be " hypotensive and was sent out of concern for sepsis.  Initial blood pressure by EMS was a 60 systolic.  There has been no fever or chills. She was started on Rocephin and metronidazole. She voices that she has pain in her legs but otherwise no complaints.     No dysuria but she does have evidence of a urinary tract infection.  Urinalysis shows cloudy appearing urine with positive blood in the urine and moderate leukocytes 52-99 WBCs per high-powered field, cultures were positive for Klebsiella pneumonia greater than 100,000 colony-forming units  White count was 14.8 with a hemoglobin of 12 hematocrit 35.8 and a platelet count of 394.  CO2 on CMP was 15 pro BNP was 1760  Lactate is 2.5.  Patient is receiving Zosyn for antibiotic coverage.  Stool PCRs are pending    Of note she does take MgO2 at the Nursing Home daily and that could be contributing to her diarrhea. Patient also has Rt Heel wound and Open Sacrum Wound .  I was consulted for evaluation of necrotic right heel with debride.    Plan  IV Fluids  IV Antibiotics   I&D Right Heel Wound with excisional debridement washout  Check stool PCR

## 2023-10-09 NOTE — PLAN OF CARE
Problem: Adult Inpatient Plan of Care  Goal: Plan of Care Review  10/9/2023 0635 by Ying Riley LPN  Outcome: Ongoing, Progressing  10/9/2023 0634 by Ying Riley LPN  Outcome: Ongoing, Progressing  Goal: Patient-Specific Goal (Individualized)  10/9/2023 0635 by Ying Riley LPN  Outcome: Ongoing, Progressing  10/9/2023 0634 by Ying Riley LPN  Outcome: Ongoing, Progressing  Goal: Absence of Hospital-Acquired Illness or Injury  10/9/2023 0635 by Ying Riley LPN  Outcome: Ongoing, Progressing  10/9/2023 0634 by Ying Riley LPN  Outcome: Ongoing, Progressing  Goal: Optimal Comfort and Wellbeing  10/9/2023 0635 by Ying Riley LPN  Outcome: Ongoing, Progressing  10/9/2023 0634 by Ying Riley LPN  Outcome: Ongoing, Progressing  Goal: Readiness for Transition of Care  10/9/2023 0635 by Ying Riley LPN  Outcome: Ongoing, Progressing  10/9/2023 0634 by Ying Riley LPN  Outcome: Ongoing, Progressing     Problem: Adjustment to Illness (Sepsis/Septic Shock)  Goal: Optimal Coping  10/9/2023 0635 by Ying Riley LPN  Outcome: Ongoing, Progressing  10/9/2023 0634 by Ying Riley LPN  Outcome: Ongoing, Progressing     Problem: Bleeding (Sepsis/Septic Shock)  Goal: Absence of Bleeding  10/9/2023 0635 by Ying Riley LPN  Outcome: Ongoing, Progressing  10/9/2023 0634 by Ying Riley LPN  Outcome: Ongoing, Progressing     Problem: Glycemic Control Impaired (Sepsis/Septic Shock)  Goal: Blood Glucose Level Within Desired Range  10/9/2023 0635 by Ying Riley LPN  Outcome: Ongoing, Progressing  10/9/2023 0634 by Ying Riley LPN  Outcome: Ongoing, Progressing     Problem: Infection Progression (Sepsis/Septic Shock)  Goal: Absence of Infection Signs and Symptoms  10/9/2023 0635 by Ying Riley LPN  Outcome: Ongoing, Progressing  10/9/2023 0634 by Osvaldo, Ying, LPN  Outcome: Ongoing, Progressing     Problem: Nutrition Impaired (Sepsis/Septic Shock)  Goal:  Optimal Nutrition Intake  10/9/2023 0635 by Ying Riley LPN  Outcome: Ongoing, Progressing  10/9/2023 0634 by Ying Riley LPN  Outcome: Ongoing, Progressing     Problem: Fluid and Electrolyte Imbalance (Acute Kidney Injury/Impairment)  Goal: Fluid and Electrolyte Balance  10/9/2023 0635 by Ying Riley LPN  Outcome: Ongoing, Progressing  10/9/2023 0634 by Ying Riley LPN  Outcome: Ongoing, Progressing     Problem: Oral Intake Inadequate (Acute Kidney Injury/Impairment)  Goal: Optimal Nutrition Intake  10/9/2023 0635 by Ying Riley LPN  Outcome: Ongoing, Progressing  10/9/2023 0634 by Ying Riley LPN  Outcome: Ongoing, Progressing     Problem: Renal Function Impairment (Acute Kidney Injury/Impairment)  Goal: Effective Renal Function  10/9/2023 0635 by Ying Riley LPN  Outcome: Ongoing, Progressing  10/9/2023 0634 by Ying Riley LPN  Outcome: Ongoing, Progressing     Problem: UTI (Urinary Tract Infection)  Goal: Improved Infection Symptoms  10/9/2023 0635 by Ying Riley LPN  Outcome: Ongoing, Progressing  10/9/2023 0634 by Ying Riley LPN  Outcome: Ongoing, Progressing     Problem: Diarrhea  Goal: Fluid and Electrolyte Balance  10/9/2023 0635 by Ying Riley LPN  Outcome: Ongoing, Progressing  10/9/2023 0634 by Ying Riley LPN  Outcome: Ongoing, Progressing     Problem: Fall Injury Risk  Goal: Absence of Fall and Fall-Related Injury  10/9/2023 0635 by Ying Riley LPN  Outcome: Ongoing, Progressing  10/9/2023 0634 by Ying Riley LPN  Outcome: Ongoing, Progressing     Problem: Skin Injury Risk Increased  Goal: Skin Health and Integrity  10/9/2023 0635 by Ying Riley LPN  Outcome: Ongoing, Progressing  10/9/2023 0634 by Ying Riley LPN  Outcome: Ongoing, Progressing     Problem: Infection  Goal: Absence of Infection Signs and Symptoms  10/9/2023 0635 by Ying Riley LPN  Outcome: Ongoing, Progressing  10/9/2023 0634 by Ying Riley,  LPN  Outcome: Ongoing, Progressing     Problem: Impaired Wound Healing  Goal: Optimal Wound Healing  10/9/2023 0635 by Ying Riley LPN  Outcome: Ongoing, Progressing  10/9/2023 0634 by Ying Riley LPN  Outcome: Ongoing, Progressing

## 2023-10-09 NOTE — PROGRESS NOTES
[facility]/Surg  [facility]  Progress Note    Patient Name: Zackery Purdy  MRN: [id number]  Patient Class: IP- Inpatient   Admission Date: 10/6/2023  Length of Stay: 3 days  Attending Physician: Saad Lilly MD  Primary Care Provider: Carol, Primary Doctor        Subjective:     Principal Problem:Severe sepsis        HPI:  The patient is a 89 year-old female sent from the nursing home for concern of dehydration. She has had diarrhea and vomiting all day at the nursing home. She was found to be hypotensive and was sent out of concern for sepsis. She was started on Rocephin and metronidazole. She voices that she has pain in her legs but otherwise no complaints. There's no chest pain or shortness of breath. No fever or chills noted. No dysuria but she does have evidence of a urinary tract infection. Lactate is 2.5. Of note she does take MgO2 at the NH daily and this could be contributing to her diarrhea.           Past Medical History:   Diagnosis Date    Anemia, unspecified      Anorexia      Blindness      Constipation      Coronary artery disease      GERD (gastroesophageal reflux disease)      Gout, unspecified      Heart failure, unspecified       hypertensive heart disease related    Hypertension      Insomnia      Intervertebral disc disorder with myelopathy, lumbar region      Mixed hyperlipidemia      Mixed hyperlipidemia      NSTEMI (non-ST elevated myocardial infarction)      Pruritus      Small bowel obstruction      Syndrome of inappropriate ADH (SIADH) secretion      Unspecified glaucoma       bilat.    Unspecified glaucoma        Overview/Hospital Course:  10/06/2023 patient resting comfortably tolerating antibiotics.  Cultures are pending.  She is very weak but thinks she can perform some physical therapy.  Right heel shows unstageable decubitus Dr. Lalit Jackson has been consulted.  10/07/2023 elderly female from the nursing home in 4 dehydration as well as  sepsis.  She is been found to have unstageable heel decubitus with surgery consult.  Today she is noted to be significantly anemic probably due to dilution as well as being chronically anemic.  Order anemia workup and continue her antibiotics and await the surgery consult.  10/08/2023 patient awaiting surgical debridement of her heel decubitus.  She is responding well to treatment for sepsis.  Anemia is slightly improved.  10/09/2023 patient postop day 1 for surgical debridement of heel decubitus.  Still on antibiotics for sepsis.  Awaiting stool for occult blood to workup her anemia.      Interval History:     Review of Systems   Constitutional:  Positive for appetite change, fatigue and fever. Negative for activity change, chills and diaphoresis.   HENT:  Negative for congestion, ear pain, rhinorrhea and sore throat.    Eyes:  Negative for pain, discharge and redness.   Respiratory:  Negative for apnea, cough, choking, chest tightness, shortness of breath and wheezing.    Cardiovascular:  Negative for chest pain, palpitations and leg swelling.   Gastrointestinal:  Negative for abdominal distention, abdominal pain, blood in stool, constipation, diarrhea, nausea and vomiting.   Endocrine: Negative for cold intolerance, heat intolerance and polyuria.   Genitourinary:  Negative for difficulty urinating, dysuria, frequency, hematuria and pelvic pain.   Musculoskeletal:  Negative for arthralgias, back pain, gait problem, joint swelling and myalgias.   Skin:  Positive for wound. Negative for color change, pallor and rash.   Neurological:  Positive for dizziness and weakness. Negative for seizures, syncope, speech difficulty, numbness and headaches.   Psychiatric/Behavioral:  Negative for agitation, confusion, hallucinations and sleep disturbance. The patient is not nervous/anxious.      Objective:     Vital Signs (Most Recent):  Temp: 98.3 °F (36.8 °C) (10/09/23 0733)  Pulse: 96 (10/09/23 0801)  Resp: 16 (10/09/23  0801)  BP: (!) 141/63 (10/09/23 0818)  SpO2: (!) 94 % (10/09/23 0801) Vital Signs (24h Range):  Temp:  [97.9 °F (36.6 °C)-99 °F (37.2 °C)] 98.3 °F (36.8 °C)  Pulse:  [75-96] 96  Resp:  [16-20] 16  SpO2:  [94 %-100 %] 94 %  BP: (121-150)/(37-63) 141/63     Weight: 53.6 kg (118 lb 2.7 oz)  Body mass index is 19.07 kg/m².    Intake/Output Summary (Last 24 hours) at 10/9/2023 0821  Last data filed at 10/9/2023 0615  Gross per 24 hour   Intake 2325 ml   Output 1500 ml   Net 825 ml           Physical Exam  Constitutional:       General: She is not in acute distress.     Appearance: Normal appearance. She is ill-appearing. She is not toxic-appearing or diaphoretic.   HENT:      Head: Normocephalic and atraumatic.      Right Ear: External ear normal.      Left Ear: External ear normal.      Nose: Nose normal. No congestion or rhinorrhea.      Mouth/Throat:      Mouth: Mucous membranes are dry.      Pharynx: Oropharynx is clear.   Eyes:      Extraocular Movements: Extraocular movements intact.      Conjunctiva/sclera: Conjunctivae normal.      Pupils: Pupils are equal, round, and reactive to light.   Neck:      Vascular: No carotid bruit.   Cardiovascular:      Rate and Rhythm: Normal rate and regular rhythm.      Pulses: Normal pulses.      Heart sounds: Normal heart sounds. No murmur heard.  Pulmonary:      Effort: Pulmonary effort is normal. No respiratory distress.      Breath sounds: Normal breath sounds. No wheezing, rhonchi or rales.   Abdominal:      General: Abdomen is flat. Bowel sounds are normal. There is no distension.      Palpations: Abdomen is soft.      Tenderness: There is no abdominal tenderness. There is no guarding.   Musculoskeletal:         General: No swelling or tenderness. Normal range of motion.      Cervical back: Normal range of motion and neck supple. No tenderness.      Right lower leg: No edema.      Left lower leg: No edema.   Lymphadenopathy:      Cervical: No cervical adenopathy.   Skin:     " General: Skin is warm and dry.      Coloration: Skin is not jaundiced or pale.      Findings: Lesion present.   Neurological:      General: No focal deficit present.      Mental Status: She is alert and oriented to person, place, and time. Mental status is at baseline.      Cranial Nerves: No cranial nerve deficit.      Motor: Weakness present.      Coordination: Coordination abnormal.      Gait: Gait abnormal.   Psychiatric:         Mood and Affect: Mood normal.         Behavior: Behavior normal.         Thought Content: Thought content normal.         Judgment: Judgment normal.             Significant Labs: All pertinent labs within the past 24 hours have been reviewed.    Significant Imaging: I have reviewed all pertinent imaging results/findings within the past 24 hours.      Assessment/Plan:      * Severe sepsis  This patient does have evidence of infective focus  My overall impression is sepsis.  Source: Urinary Tract  Antibiotics given-   Antibiotics (72h ago, onward)    Start     Stop Route Frequency Ordered    10/07/23 1950  piperacillin-tazobactam (ZOSYN) 4.5 g in dextrose 5 % in water (D5W) 100 mL IVPB (MB+)         -- IV Every 12 hours (non-standard times) 10/07/23 0825    10/06/23 0900  mupirocin 2 % ointment         10/11/23 0859 Nasl 2 times daily 10/06/23 0619        Latest lactate reviewed-  No results for input(s): "LACTATE" in the last 72 hours.  Organ dysfunction indicated by Acute kidney injury    Fluid challenge Ideal Body Weight- The patient's ideal body weight is Ideal body weight: 59.3 kg (130 lb 11.7 oz) which will be used to calculate fluid bolus of 30 ml/kg for treatment of septic shock.      Post- resuscitation assessment Yes Perfusion exam was performed within 6 hours of septic shock presentation after bolus shows Adequate tissue perfusion assessed by non-invasive monitoring       Will Not start Pressors- Levophed for MAP of 65      Decubitus ulcer of heel  Continue antibiotics.  " Consult Dr. Paulino.      Dehydration  IV fluids      BETHANY (acute kidney injury)  Patient with acute kidney injury/acute renal failure likely due to pre-renal azotemia due to IVVD BETHANY is currently stable. Baseline creatinine unknown - Labs reviewed- Renal function/electrolytes with Estimated Creatinine Clearance: 24.6 mL/min (A) (based on SCr of 1.26 mg/dL (H)). according to latest data. Monitor urine output and serial BMP and adjust therapy as needed. Avoid nephrotoxins and renally dose meds for GFR listed above.    Coronary artery disease involving coronary bypass graft of native heart    Continue current medications    Normocytic anemia  Anemia workup.  IV iron.        VTE Risk Mitigation (From admission, onward)         Ordered     enoxaparin injection 30 mg  Daily         10/06/23 0514     IP VTE HIGH RISK PATIENT  Once         10/06/23 0514                Discharge Planning   SANTINO: 10/11/2023     Code Status: DNR   Is the patient medically ready for discharge?:     Reason for patient still in hospital (select all that apply): Patient unstable  Discharge Plan A: Return to nursing home                  Tanner Moncada MD  Department of Hospital Medicine   [facility]/S[facility]

## 2023-10-09 NOTE — ASSESSMENT & PLAN NOTE
Patient with acute kidney injury/acute renal failure likely due to pre-renal azotemia due to IVVD BETHANY is currently stable. Baseline creatinine unknown - Labs reviewed- Renal function/electrolytes with Estimated Creatinine Clearance: 24.6 mL/min (A) (based on SCr of 1.26 mg/dL (H)). according to latest data. Monitor urine output and serial BMP and adjust therapy as needed. Avoid nephrotoxins and renally dose meds for GFR listed above.

## 2023-10-09 NOTE — SUBJECTIVE & OBJECTIVE
Interval History:     Review of Systems   Constitutional:  Positive for appetite change, fatigue and fever. Negative for activity change, chills and diaphoresis.   HENT:  Negative for congestion, ear pain, rhinorrhea and sore throat.    Eyes:  Negative for pain, discharge and redness.   Respiratory:  Negative for apnea, cough, choking, chest tightness, shortness of breath and wheezing.    Cardiovascular:  Negative for chest pain, palpitations and leg swelling.   Gastrointestinal:  Negative for abdominal distention, abdominal pain, blood in stool, constipation, diarrhea, nausea and vomiting.   Endocrine: Negative for cold intolerance, heat intolerance and polyuria.   Genitourinary:  Negative for difficulty urinating, dysuria, frequency, hematuria and pelvic pain.   Musculoskeletal:  Negative for arthralgias, back pain, gait problem, joint swelling and myalgias.   Skin:  Positive for wound. Negative for color change, pallor and rash.   Neurological:  Positive for dizziness and weakness. Negative for seizures, syncope, speech difficulty, numbness and headaches.   Psychiatric/Behavioral:  Negative for agitation, confusion, hallucinations and sleep disturbance. The patient is not nervous/anxious.      Objective:     Vital Signs (Most Recent):  Temp: 98.3 °F (36.8 °C) (10/09/23 0733)  Pulse: 96 (10/09/23 0801)  Resp: 16 (10/09/23 0801)  BP: (!) 141/63 (10/09/23 0818)  SpO2: (!) 94 % (10/09/23 0801) Vital Signs (24h Range):  Temp:  [97.9 °F (36.6 °C)-99 °F (37.2 °C)] 98.3 °F (36.8 °C)  Pulse:  [75-96] 96  Resp:  [16-20] 16  SpO2:  [94 %-100 %] 94 %  BP: (121-150)/(37-63) 141/63     Weight: 53.6 kg (118 lb 2.7 oz)  Body mass index is 19.07 kg/m².    Intake/Output Summary (Last 24 hours) at 10/9/2023 0821  Last data filed at 10/9/2023 0615  Gross per 24 hour   Intake 2325 ml   Output 1500 ml   Net 825 ml           Physical Exam  Constitutional:       General: She is not in acute distress.     Appearance: Normal appearance.  She is ill-appearing. She is not toxic-appearing or diaphoretic.   HENT:      Head: Normocephalic and atraumatic.      Right Ear: External ear normal.      Left Ear: External ear normal.      Nose: Nose normal. No congestion or rhinorrhea.      Mouth/Throat:      Mouth: Mucous membranes are dry.      Pharynx: Oropharynx is clear.   Eyes:      Extraocular Movements: Extraocular movements intact.      Conjunctiva/sclera: Conjunctivae normal.      Pupils: Pupils are equal, round, and reactive to light.   Neck:      Vascular: No carotid bruit.   Cardiovascular:      Rate and Rhythm: Normal rate and regular rhythm.      Pulses: Normal pulses.      Heart sounds: Normal heart sounds. No murmur heard.  Pulmonary:      Effort: Pulmonary effort is normal. No respiratory distress.      Breath sounds: Normal breath sounds. No wheezing, rhonchi or rales.   Abdominal:      General: Abdomen is flat. Bowel sounds are normal. There is no distension.      Palpations: Abdomen is soft.      Tenderness: There is no abdominal tenderness. There is no guarding.   Musculoskeletal:         General: No swelling or tenderness. Normal range of motion.      Cervical back: Normal range of motion and neck supple. No tenderness.      Right lower leg: No edema.      Left lower leg: No edema.   Lymphadenopathy:      Cervical: No cervical adenopathy.   Skin:     General: Skin is warm and dry.      Coloration: Skin is not jaundiced or pale.      Findings: Lesion present.   Neurological:      General: No focal deficit present.      Mental Status: She is alert and oriented to person, place, and time. Mental status is at baseline.      Cranial Nerves: No cranial nerve deficit.      Motor: Weakness present.      Coordination: Coordination abnormal.      Gait: Gait abnormal.   Psychiatric:         Mood and Affect: Mood normal.         Behavior: Behavior normal.         Thought Content: Thought content normal.         Judgment: Judgment normal.              Significant Labs: All pertinent labs within the past 24 hours have been reviewed.    Significant Imaging: I have reviewed all pertinent imaging results/findings within the past 24 hours.

## 2023-10-09 NOTE — PROGRESS NOTES
Pharmacist Renal Dose Adjustment Note    Zackery Purdy is a 89 y.o. female being treated with the medication pip/tazo    Patient Data:    Vital Signs (Most Recent):  Temp: 98.3 °F (36.8 °C) (10/09/23 0733)  Pulse: 96 (10/09/23 0801)  Resp: 16 (10/09/23 0801)  BP: (!) 141/63 (10/09/23 0818)  SpO2: (!) 94 % (10/09/23 0801) Vital Signs (72h Range):  Temp:  [97.1 °F (36.2 °C)-99 °F (37.2 °C)]   Pulse:  []   Resp:  [16-20]   BP: ()/(36-78)   SpO2:  [94 %-100 %]      Recent Labs   Lab 10/07/23  0426 10/08/23  0526 10/09/23  0512   CREATININE 2.12* 1.62* 1.26*     Serum creatinine: 1.26 mg/dL (H) 10/09/23 0512  Estimated creatinine clearance: 24.6 mL/min (A)    Medication:pip/tazo dose: 4.5gm frequency q12h will be changed to medication:pip/tazo dose:4.5gm frequency:q8h    Pharmacist's Name: Marie Hartman  Pharmacist's Extension: [id number]

## 2023-10-09 NOTE — PLAN OF CARE
10/09/23 1145   Discharge Reassessment   Assessment Type Discharge Planning Assessment   Did the patient's condition or plan change since previous assessment? No   Discharge Plan discussed with: Caregiver   Name(s) and Number(s) Emilyana   Communicated SANTINO with patient/caregiver Date not available/Unable to determine   Discharge Plan A Return to nursing home   DME Needed Upon Discharge  none   Why the patient remains in the hospital Requires continued medical care   Post-Acute Status   Discharge Delays None known at this time

## 2023-10-09 NOTE — CONSULTS
Inpatient Nutrition Evaluation    Admit Date: 10/6/2023   Total duration of encounter: 2 days    Nutrition Recommendation/Prescription     Continue Low Sodium 2 gm diet as tolerated    Recommend Boost Plus BID to better meet nutritional needs (360 kcal, 14 gm pro ea)    Recommend ProSource BID to aid in healing (100 kcal, 10 gm pro ea)    Continue Iron supplementation    Dietitian will monitor Electrolytes, Enteral Nutrition Intake, Food and Beverage Intake, Gastrointestinal Profile, Renal Function, Weight, Weight Change, and Wound Healing and adjust MNT as needed.       Monitoring & Evaluation       Reason Seen: malnutrition screening tool (MST) and physician consult for wound    Nutrition Risk/Follow-Up: High (Assess in 24-48 Hours)  Patients assigned 'Moderate or High nutrition risk' qualify for full nutritional assessment and will be assessed in 24-48 hours.     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, NORMOCYTIC ANEMIA    Past Medical History:   Diagnosis Date    Anemia, unspecified     Anorexia     Blindness     Constipation     Coronary artery disease     Decubitus ulcer of heel 10/6/2023    GERD (gastroesophageal reflux disease)     Gout, unspecified     Heart failure, unspecified     hypertensive heart disease related    Hypertension     Insomnia     Intervertebral disc disorder with myelopathy, lumbar region     Mixed hyperlipidemia     Mixed hyperlipidemia     NSTEMI (non-ST elevated myocardial infarction)     Pruritus     Small bowel obstruction     Syndrome of inappropriate ADH (SIADH) secretion     Unspecified glaucoma     bilat.    Unspecified glaucoma      Past Surgical History:   Procedure Laterality Date     "APPENDECTOMY      CHOLECYSTECTOMY      CORONARY ARTERY BYPASS GRAFT      HYSTERECTOMY         Nutrition-Related Medications: ATORVASTATIN, IRON SUCROSE, MEGACE, PROTONIX, ZOSYN, NACL, D10W, GLUCAGON, GLUCOSE, SSI, ZOFRAN      Nutrition-Related Labs:  Lab Results   Component Value Date    WBC 12.47 (H) 10/08/2023    WBC 6.4 07/10/2022     Lab Results   Component Value Date    RBC 2.82 (L) 10/08/2023    RBC 3.39 (L) 07/10/2022     Lab Results   Component Value Date    HGB 8.9 (L) 10/08/2023    HGB 10.7 (L) 07/10/2022     Lab Results   Component Value Date    HCT 25.8 (L) 10/08/2023    HCT 32.2 (L) 07/10/2022     Lab Results   Component Value Date    MCV 91.5 10/08/2023    MCV 95.0 (H) 07/10/2022     Lab Results   Component Value Date    MCH 31.6 10/08/2023    MCH 31.6 (H) 07/10/2022     Lab Results   Component Value Date    MCHC 34.5 10/08/2023    MCHC 33.2 07/10/2022     Lab Results   Component Value Date     (H) 10/08/2023     (L) 07/10/2022     Lab Results   Component Value Date    K 3.5 10/08/2023    K 4.1 07/10/2022     Lab Results   Component Value Date    CHLORIDE 122 (H) 10/08/2023    CHLORIDE 95 (L) 07/10/2022     Lab Results   Component Value Date    CO2 14 (L) 10/08/2023    CO2 27 07/10/2022     Lab Results   Component Value Date    AGAP 13.0 10/08/2023    AGAP 9.0 07/11/2022     Lab Results   Component Value Date    BUN 47.0 (H) 10/08/2023    BUN 17.0 07/10/2022     Lab Results   Component Value Date    CREATININE 1.62 (H) 10/08/2023    CREATININE 0.97 07/10/2022     Lab Results   Component Value Date    EGFRNORACEVR 30 10/08/2023    EGFRNORACEVR 53 09/19/2023     Lab Results   Component Value Date    GLUCOSE 82 10/08/2023    GLUCOSE 110 07/10/2022     Lab Results   Component Value Date    POCTGLUCOSE 144 (H) 10/06/2023    POCTGLUCOSE 123 (H) 10/06/2023      No components found for: "HGBAFLC"  Lab Results   Component Value Date    CALCIUM 9.3 10/08/2023    CALCIUM 10.4 (H) 07/10/2022     Lab " "Results   Component Value Date    PHOS 3.6 10/08/2023    PHOS 3.7 07/10/2022     Lab Results   Component Value Date    MG 2.40 10/08/2023    MG 2.10 07/10/2022     Lab Results   Component Value Date    ALKPHOS 170 (H) 10/08/2023    ALKPHOS 87 07/10/2022     Lab Results   Component Value Date    ALBUMIN 2.6 (L) 10/08/2023    ALBUMIN 3.6 07/10/2022     No results found for: "PREALB"  Lab Results   Component Value Date    BILITOT 0.7 10/08/2023    BILITOT 0.7 07/10/2022     Lab Results   Component Value Date    AST 44 (H) 10/08/2023    AST 32 07/10/2022     Lab Results   Component Value Date    ALT 41 10/08/2023    ALT 27 07/10/2022     No results found for: "LIPASE"  No results found for: "AMYLASE"  Lab Results   Component Value Date    CHOL 74 09/19/2023     Lab Results   Component Value Date    HDL 30 (L) 09/19/2023     No results found for: "LDLCALC"  Lab Results   Component Value Date    TRIG 36 09/19/2023     Lab Results   Component Value Date    IRON 13 (L) 07/11/2022     Lab Results   Component Value Date    TIBC 227 (L) 07/11/2022     Lab Results   Component Value Date    FOLATE 14.1 10/08/2023    FOLATE 16.5 07/12/2022     CrCl:    Lab Value: 19.1    Date: 10/08    Diet Order: Diet Low Sodium, 2gm Isolation Tray - Regular [place]  Oral Supplement Order: none  Appetite/Oral Intake: poor/0-25% of meals  Factors Affecting Nutritional Intake: altered gastrointestinal function, decreased appetite, diarrhea, and vomiting  Food/Judaism/Cultural Preferences: unable to obtain  Food Allergies: no known food allergies    Skin Integrity: incision, wound  Wound(s): [REMOVED]      Altered Skin Integrity 10/06/23 0500 Right Heel Full thickness tissue loss. Base is covered by slough and/or eschar in the wound bed-Tissue loss description: Full thickness       Altered Skin Integrity 10/06/23 0500 Right Buttocks Partial thickness tissue loss. Shallow open ulcer with a red or pink wound bed, without slough. Intact or " "Open/Ruptured Serum-filled blister.-Tissue loss description: Full thickness     Overview/Hospital Course:    (10/08) 89 Y.O FEMALE ADMITTED W/ DEHYDRATION, V/D, AWAITING DEBRIDEMENT OF HER HEEL DECUBITUS PER MD, NH PT, ISOLATION, LOW SODIUM 2 GM DIET- POOR APT, 0-25% X4 MEALS, NUTR SCORE: 2, BRDN SCORE: 14, ASSISTANCE W/FEEDS & NO DIFFICULTY SWALLOWING PER FXN SCREEN, MULTI THICKNESS WNDS NOTED ABOVE, I/O: 120/1100, PER EMR    Anthropometrics    Height: 5' 6" (167.6 cm) Height Method: Stated  Last Weight: 53.6 kg (118 lb 2.7 oz) (10/08/23 2097) Weight Method: Bed Scale  BMI (Calculated): 19.1  BMI Classification: underweight (BMI less than 22 if >65 years of age)     Ideal Body Weight (IBW), Female: 130 lb     % Ideal Body Weight, Female (lb): 92.69 %                             Usual Weight Provided By: EMR weight history    Wt Readings from Last 5 Encounters:   10/08/23 53.6 kg (118 lb 2.7 oz)   07/14/22 79 kg (174 lb 2.6 oz)     Weight Change(s) Since Admission:  Admit Weight: 60.3 kg (133 lb) (10/06/23 0010)  -56# (-32%) WT LOSS IN 1 YR 3 MO. - SIGNIFICANT    Patient Education    Not applicable.           "

## 2023-10-09 NOTE — PT/OT/SLP PROGRESS
Physical Therapy Treatment    Patient Name:  Zackery Purdy   MRN:  [id number]    Recommendations:     Discharge Recommendations: nursing facility, skilled  Discharge Equipment Recommendations: wheelchair, hospital bed  Barriers to discharge: None    Assessment:     Zackery Purdy is a 89 y.o. female admitted with a medical diagnosis of Severe sepsis.  She presents with the following impairments/functional limitations: weakness, impaired endurance, impaired functional mobility.    Patient very lethargic this morning. Patient did have foot wound debrided yesterday. Patient assisted with repositioning and lifting legs to move more toward center.     Rehab Prognosis: Good; patient would benefit from acute skilled PT services to address these deficits and reach maximum level of function.    Recent Surgery: Procedure(s) (LRB):  DEBRIDEMENT, WOUND (Right) 1 Day Post-Op    Plan:     During this hospitalization, patient to be seen 5 x/week (5-6x weekly/1-2x daily) to address the identified rehab impairments via therapeutic activities, therapeutic exercises and progress toward the following goals:    Plan of Care Expires:  11/06/23    Subjective     Chief Complaint: weakness  Patient/Family Comments/goals: improve strength  Pain/Comfort:         Objective:     Communicated with nurse prior to session.  Patient found HOB elevated with peripheral IV, ratliff catheter upon PT entry to room.     General Precautions: Standard, fall  Orthopedic Precautions: N/A  Braces: N/A  Respiratory Status: Room air     Functional Mobility:  Bed Mobility:     Scooting: maximal assistance  Supine to Sit: moderate assistance  Sit to Supine: maximal assistance  Balance: NT today          Patient left HOB elevated with call button in reach and RN present..    GOALS:   Multidisciplinary Problems       Physical Therapy Goals          Problem: Physical Therapy    Goal Priority Disciplines Outcome Goal Variances Interventions   Physical Therapy Goal      PT, PT/OT Ongoing, Progressing     Description: Goals to be met by: discharge     Patient will increase functional independence with mobility by performin. Supine to sit with MInimal Assistance  2. Sit to stand transfer with Minimal Assistance  3. Bed to chair transfer with Minimal Assistance using No Assistive Device                         Time Tracking:     PT Received On: 10/09/23  PT Start Time: 930     PT Stop Time: 955  PT Total Time (min): 25 min     Billable Minutes: Therapeutic Activity 15 and Therapeutic Exercise 10    Treatment Type: Treatment  PT/PTA: PT           10/09/2023

## 2023-10-10 NOTE — ASSESSMENT & PLAN NOTE
Patient with acute kidney injury/acute renal failure likely due to pre-renal azotemia due to IVVD BETHANY is currently stable. Baseline creatinine unknown - Labs reviewed- Renal function/electrolytes with Estimated Creatinine Clearance: 26.5 mL/min (based on SCr of 1.17 mg/dL). according to latest data. Monitor urine output and serial BMP and adjust therapy as needed. Avoid nephrotoxins and renally dose meds for GFR listed above.

## 2023-10-10 NOTE — PROGRESS NOTES
Nursing students assigned to patient today.  Mirian Alvarado RN, instructor stated they will perform wound care for today.

## 2023-10-10 NOTE — PT/OT/SLP PROGRESS
Physical Therapy      Patient Name:  Zackery Purdy   MRN:  [id number]    Patient not seen today secondary to Nurse/ JALEN hold (Nursing states that patient has increased pain today and is getting blood transfusion, hold PT today). Will follow-up 10/11/23.

## 2023-10-10 NOTE — ASSESSMENT & PLAN NOTE
"This patient does have evidence of infective focus  My overall impression is sepsis.  Source: Urinary Tract  Antibiotics given-   Antibiotics (72h ago, onward)    Start     Stop Route Frequency Ordered    10/09/23 1600  piperacillin-tazobactam (ZOSYN) 4.5 g in dextrose 5 % in water (D5W) 100 mL IVPB (MB+)         -- IV Every 8 hours (non-standard times) 10/09/23 0924    10/06/23 0900  mupirocin 2 % ointment         10/11/23 0859 Nasl 2 times daily 10/06/23 0619        Latest lactate reviewed-  No results for input(s): "LACTATE" in the last 72 hours.  Organ dysfunction indicated by Acute kidney injury    Fluid challenge Ideal Body Weight- The patient's ideal body weight is Ideal body weight: 59.3 kg (130 lb 11.7 oz) which will be used to calculate fluid bolus of 30 ml/kg for treatment of septic shock.      Post- resuscitation assessment Yes Perfusion exam was performed within 6 hours of septic shock presentation after bolus shows Adequate tissue perfusion assessed by non-invasive monitoring       Will Not start Pressors- Levophed for MAP of 65  10/10/2023 DC Zosyn and start cefepime    "

## 2023-10-10 NOTE — CONSULTS
Inpatient Nutrition Assessment    Admit Date: 10/6/2023   Total duration of encounter: 4 days     Nutrition Recommendation/Prescription     Continue Low Na-Isolation.    Change ONS from Boost Plus and ProSource to Novasource BID to provide more calories with less fluid (475 kcal, 22 gm pro each).    Continue to encourage PO intake and honor patient preference.     If patient appetite/PO intake does not improve by follow up, RD to consider alternate nutrition due to already on Megace     Dietitian will monitor Electrolytes, Energy Intake, Food and Beverage Intake, Renal Function, Weight, Weight Change, and Wound Healing and adjust MNT as needed.     Monitoring & Evaluation     Communication of Recommendations: reviewed with nurse and reviewed with patient    Reason Seen: continuous nutrition monitoring and RD Screened High Risk    Nutrition Risk/Follow-Up: high (follow-up in 1-4 days)   Please consult if re-assessment needed sooner.      Nutrition Assessment     Malnutrition Assessment/Nutrition-Focused Physical Exam    Patient underweight per age and per EMR patient weighed 79 kg on 7/14/22, CBW- 53.6 kg showing a 55.88# (32.15%) weight loss in 1 year 3 months showing significant weight loss per time frame. But NFPE not appropriate at this time.    Chart Review    Malnutrition Screening Tool Results   Have you recently lost weight without trying?: Unsure  Have you been eating poorly because of a decreased appetite?: Yes   MST Score: 3     Diagnosis:  Severe Sepsis, Decubitus ulcer of heel, Dehydration, BETHANY, CAD involving CABG, Normocytic anemia.     Past Medical History:   Diagnosis Date    Anemia, unspecified     Anorexia     Blindness     Constipation     Coronary artery disease     Decubitus ulcer of heel 10/6/2023    GERD (gastroesophageal reflux disease)     Gout, unspecified     Heart failure, unspecified     hypertensive heart disease related    Hypertension     Insomnia     Intervertebral disc disorder with  myelopathy, lumbar region     Mixed hyperlipidemia     Mixed hyperlipidemia     NSTEMI (non-ST elevated myocardial infarction)     Pruritus     Small bowel obstruction     Syndrome of inappropriate ADH (SIADH) secretion     Unspecified glaucoma     bilat.    Unspecified glaucoma      Past Surgical History:   Procedure Laterality Date    APPENDECTOMY      CHOLECYSTECTOMY      CORONARY ARTERY BYPASS GRAFT      HYSTERECTOMY      WOUND DEBRIDEMENT Right 10/8/2023    Procedure: DEBRIDEMENT, WOUND;  Surgeon: Jerod Toth MD;  Location: [facility] OR;  Service: General;  Laterality: Right;  HEEL       Nutrition-Related Medications: Maxipime, Iron Sucrose, Megace, IVF @ 75 ml/hr.   Calorie Containing IV Medications: no significant kcals from medications at this time    Nutrition-Related Labs:   Lab Results   Component Value Date    WBC 18.82 (H) 10/10/2023    WBC 6.4 07/10/2022     Lab Results   Component Value Date    RBC 2.29 (L) 10/10/2023    RBC 3.39 (L) 07/10/2022     Lab Results   Component Value Date    HGB 7.2 (L) 10/10/2023    HGB 10.7 (L) 07/10/2022     Lab Results   Component Value Date    HCT 21.7 (L) 10/10/2023    HCT 32.2 (L) 07/10/2022     Lab Results   Component Value Date    MCV 94.8 10/10/2023    MCV 95.0 (H) 07/10/2022     Lab Results   Component Value Date    MCH 31.4 10/10/2023    MCH 31.6 (H) 07/10/2022     Lab Results   Component Value Date    MCHC 33.2 10/10/2023    MCHC 33.2 07/10/2022     Lab Results   Component Value Date     (H) 10/10/2023     (L) 07/10/2022     Lab Results   Component Value Date    CHLORIDE 126 (H) 10/10/2023    CHLORIDE 95 (L) 07/10/2022     Lab Results   Component Value Date    CO2 17 (L) 10/10/2023    CO2 27 07/10/2022     Lab Results   Component Value Date    BUN 25.0 (H) 10/10/2023    BUN 17.0 07/10/2022     Lab Results   Component Value Date    EGFRNORACEVR 44 10/10/2023    EGFRNORACEVR 53 09/19/2023     Lab Results   Component Value Date    GLUCOSE 79  10/10/2023    GLUCOSE 110 07/10/2022     Lab Results   Component Value Date    POCTGLUCOSE 144 (H) 10/06/2023    POCTGLUCOSE 123 (H) 10/06/2023      Lab Results   Component Value Date    ALKPHOS 146 (H) 10/10/2023    ALKPHOS 87 07/10/2022     Lab Results   Component Value Date    ALBUMIN 2.3 (L) 10/10/2023    ALBUMIN 3.6 07/10/2022     Lab Results   Component Value Date    AST 46 (H) 10/10/2023    AST 32 07/10/2022     Lab Results   Component Value Date    IRON 125 10/08/2023    IRON 13 (L) 07/11/2022     Lab Results   Component Value Date    TIBC <150 (L) 10/08/2023    TIBC 227 (L) 07/11/2022     Lab Results   Component Value Date    FOLATE 14.1 10/08/2023    FOLATE 16.5 07/12/2022     CrCl:    Lab Value: 19.1    Date: 10/08  CrCl:    Lab Value: 26.5    Date: 10/10      Diet/PN Order: Diet Low Sodium, 2gm Isolation Tray - Regular [place]  Oral Supplement Order: Boost Plus and Prosource No Carb  Tube Feeding Order: none  Factors Affecting Nutritional Intake: decreased appetite, dry mouth, inability to feed self, and nausea  Food/Christian/Cultural Preferences:  Dislike: Brussel Sprouts and Asparagus.  Food Allergies: tree nut and Coconut    Skin Integrity: wound, skin tear  Wound(s): [REMOVED]      Altered Skin Integrity 10/06/23 0500 Right Heel Full thickness tissue loss. Base is covered by slough and/or eschar in the wound bed-Tissue loss description: Full thickness       Altered Skin Integrity 10/06/23 0500 Right Buttocks Partial thickness tissue loss. Shallow open ulcer with a red or pink wound bed, without slough. Intact or Open/Ruptured Serum-filled blister.-Tissue loss description: Partial thickness     Overview/Hospital Course:    (10/08) 89 Y.O FEMALE ADMITTED W/ DEHYDRATION, V/D, AWAITING DEBRIDEMENT OF HER HEEL DECUBITUS PER MD, NH PT, ISOLATION, LOW SODIUM 2 GM DIET- POOR APT, 0-25% X4 MEALS, NUTR SCORE: 2, BRDN SCORE: 14, ASSISTANCE W/FEEDS & NO DIFFICULTY SWALLOWING PER FXN SCREEN, MULTI THICKNESS WNDS  "NOTED ABOVE, I/O: 120/1100, PER EMR    (10/10): Patient reports poor appetite for a couple of days prior to admit and still poor currently. Patient stated talking about food makes her nauseous. Patient reported new allergies as seen above as well as food preference. Patient reports she lost 6# in 1 week at [place] but unable to give UBW. Per EMR patient weighed 79 kg on 22, CBW- 53.6 kg showing a 55.88# (32.15%) weight loss in 1 year 3 months which is significant per time frame. Patient averaged 9%-6 Meals and only drinking about 50% of ONS. RD told patient she needs to attempt to drink more or alternate nutrition may be needed to prevent further weight loss. GI: WDL/INCONTINENT/LBM-10/6, NUTR:2, BRDN: 14, NO EDEMA NOTED, 24 HR I/O: 1100/575.    Anthropometrics    Height: 5' 6" (167.6 cm) Height Method: Stated  Last Weight: 53.6 kg (118 lb 2.7 oz) (10/08/23 2257) Weight Method: Bed Scale  BMI (Calculated): 19.1  BMI Classification: underweight (BMI less than 22 if >65 years of age)     Ideal Body Weight (IBW), Female: 130 lb     % Ideal Body Weight, Female (lb): 92.69 %                             Usual Weight Provided By: patient    Wt Readings from Last 5 Encounters:   10/08/23 53.6 kg (118 lb 2.7 oz)   22 79 kg (174 lb 2.6 oz)     Weight Change(s) Since Admission:  Admit Weight: 60.3 kg (133 lb) (10/06/23 0010)      Estimated Needs    Weight Used For Calorie Calculations: 53.6 kg (118 lb 2.7 oz)  Energy Calorie Requirements (kcal): 0868-1463 KCAL (28-32 KCAL/KG CBW)  Energy Need Method: Kcal/kg  Weight Used For Protein Calculations: 53.6 kg (118 lb 2.7 oz)  Protein Requirements: 43-54 GM PROTEIN (0.8-1.0 GM/KG CBW)  Fluid Requirements (mL): 1340 ML H2O (25 ML/KG CBW)  Temp (24hrs), Av.2 °F (36.8 °C), Min:96.9 °F (36.1 °C), Max:99.5 °F (37.5 °C)         Enteral Nutrition    Patient not receiving enteral nutrition at this time.    Parenteral Nutrition    Patient not receiving parenteral nutrition " support at this time.    Evaluation of Received Nutrient Intake    Calories: not meeting estimated needs  Protein: not meeting estimated needs    Patient Education    Not applicable.         Nutrition Diagnosis     PES: Inadequate energy intake related to inability to consume sufficient nutrients as evidenced by estimated energy intake from diet less than estimated energy needs. (new)    Interventions/Goals     Intervention(s): general/healthful diet, commercial beverage, multivitamin/mineral supplement therapy, and purpose of nutrition education    Goal: Meet Greater than 75% of nutritional needs by discharge. (new)

## 2023-10-10 NOTE — ASSESSMENT & PLAN NOTE
Anemia workup.  IV iron.  As she is losing blood from her debridement wound we will give her 1 unit of packed red blood cells

## 2023-10-10 NOTE — SUBJECTIVE & OBJECTIVE
Interval History:     Review of Systems   Constitutional:  Positive for appetite change, fatigue and fever. Negative for activity change, chills and diaphoresis.   HENT:  Negative for congestion, ear pain, rhinorrhea and sore throat.    Eyes:  Negative for pain, discharge and redness.   Respiratory:  Negative for apnea, cough, choking, chest tightness, shortness of breath and wheezing.    Cardiovascular:  Negative for chest pain, palpitations and leg swelling.   Gastrointestinal:  Negative for abdominal distention, abdominal pain, blood in stool, constipation, diarrhea, nausea and vomiting.   Endocrine: Negative for cold intolerance, heat intolerance and polyuria.   Genitourinary:  Negative for difficulty urinating, dysuria, frequency, hematuria and pelvic pain.   Musculoskeletal:  Negative for arthralgias, back pain, gait problem, joint swelling and myalgias.   Skin:  Positive for wound. Negative for color change, pallor and rash.   Neurological:  Positive for dizziness and weakness. Negative for seizures, syncope, speech difficulty, numbness and headaches.   Psychiatric/Behavioral:  Negative for agitation, confusion, hallucinations and sleep disturbance. The patient is not nervous/anxious.      Objective:     Vital Signs (Most Recent):  Temp: 97.6 °F (36.4 °C) (10/10/23 0312)  Pulse: 94 (10/10/23 0747)  Resp: 16 (10/10/23 0747)  BP: (!) 136/41 (10/10/23 0312)  SpO2: 100 % (10/10/23 0747) Vital Signs (24h Range):  Temp:  [96.9 °F (36.1 °C)-98.8 °F (37.1 °C)] 97.6 °F (36.4 °C)  Pulse:  [82-96] 94  Resp:  [16-20] 16  SpO2:  [94 %-100 %] 100 %  BP: (110-143)/(41-63) 136/41     Weight: 53.6 kg (118 lb 2.7 oz)  Body mass index is 19.07 kg/m².    Intake/Output Summary (Last 24 hours) at 10/10/2023 3487  Last data filed at 10/10/2023 0695  Gross per 24 hour   Intake 1100 ml   Output 575 ml   Net 525 ml           Physical Exam  Constitutional:       General: She is not in acute distress.     Appearance: Normal appearance.  She is ill-appearing. She is not toxic-appearing or diaphoretic.   HENT:      Head: Normocephalic and atraumatic.      Right Ear: External ear normal.      Left Ear: External ear normal.      Nose: Nose normal. No congestion or rhinorrhea.      Mouth/Throat:      Mouth: Mucous membranes are dry.      Pharynx: Oropharynx is clear.   Eyes:      Extraocular Movements: Extraocular movements intact.      Conjunctiva/sclera: Conjunctivae normal.      Pupils: Pupils are equal, round, and reactive to light.   Neck:      Vascular: No carotid bruit.   Cardiovascular:      Rate and Rhythm: Normal rate and regular rhythm.      Pulses: Normal pulses.      Heart sounds: Normal heart sounds. No murmur heard.  Pulmonary:      Effort: Pulmonary effort is normal. No respiratory distress.      Breath sounds: Normal breath sounds. No wheezing, rhonchi or rales.   Abdominal:      General: Abdomen is flat. Bowel sounds are normal. There is no distension.      Palpations: Abdomen is soft.      Tenderness: There is no abdominal tenderness. There is no guarding.   Musculoskeletal:         General: No swelling or tenderness. Normal range of motion.      Cervical back: Normal range of motion and neck supple. No tenderness.      Right lower leg: No edema.      Left lower leg: No edema.   Lymphadenopathy:      Cervical: No cervical adenopathy.   Skin:     General: Skin is warm and dry.      Coloration: Skin is not jaundiced or pale.      Findings: Lesion present.   Neurological:      General: No focal deficit present.      Mental Status: She is alert and oriented to person, place, and time. Mental status is at baseline.      Cranial Nerves: No cranial nerve deficit.      Motor: Weakness present.      Coordination: Coordination abnormal.      Gait: Gait abnormal.   Psychiatric:         Mood and Affect: Mood normal.         Behavior: Behavior normal.         Thought Content: Thought content normal.         Judgment: Judgment normal.              Significant Labs: All pertinent labs within the past 24 hours have been reviewed.    Significant Imaging: I have reviewed all pertinent imaging results/findings within the past 24 hours.

## 2023-10-10 NOTE — PLAN OF CARE
Problem: Adult Inpatient Plan of Care  Goal: Plan of Care Review  Outcome: Ongoing, Progressing  Goal: Absence of Hospital-Acquired Illness or Injury  Outcome: Ongoing, Progressing  Goal: Optimal Comfort and Wellbeing  Outcome: Ongoing, Progressing  Goal: Readiness for Transition of Care  Outcome: Ongoing, Progressing     Problem: Adjustment to Illness (Sepsis/Septic Shock)  Goal: Optimal Coping  Outcome: Ongoing, Progressing     Problem: Bleeding (Sepsis/Septic Shock)  Goal: Absence of Bleeding  Outcome: Ongoing, Progressing     Problem: Glycemic Control Impaired (Sepsis/Septic Shock)  Goal: Blood Glucose Level Within Desired Range  Outcome: Ongoing, Progressing     Problem: Infection Progression (Sepsis/Septic Shock)  Goal: Absence of Infection Signs and Symptoms  Outcome: Ongoing, Progressing     Problem: Nutrition Impaired (Sepsis/Septic Shock)  Goal: Optimal Nutrition Intake  Outcome: Ongoing, Progressing     Problem: Fluid and Electrolyte Imbalance (Acute Kidney Injury/Impairment)  Goal: Fluid and Electrolyte Balance  Outcome: Ongoing, Progressing     Problem: Oral Intake Inadequate (Acute Kidney Injury/Impairment)  Goal: Optimal Nutrition Intake  Outcome: Ongoing, Progressing     Problem: Renal Function Impairment (Acute Kidney Injury/Impairment)  Goal: Effective Renal Function  Outcome: Ongoing, Progressing     Problem: UTI (Urinary Tract Infection)  Goal: Improved Infection Symptoms  Outcome: Ongoing, Progressing     Problem: Diarrhea  Goal: Fluid and Electrolyte Balance  Outcome: Ongoing, Progressing     Problem: Fall Injury Risk  Goal: Absence of Fall and Fall-Related Injury  Outcome: Ongoing, Progressing     Problem: Skin Injury Risk Increased  Goal: Skin Health and Integrity  Outcome: Ongoing, Progressing     Problem: Infection  Goal: Absence of Infection Signs and Symptoms  Outcome: Ongoing, Progressing     Problem: Impaired Wound Healing  Goal: Optimal Wound Healing  Outcome: Ongoing, Progressing

## 2023-10-10 NOTE — PROGRESS NOTES
[facility]/Surg  [facility] Medicine  Progress Note    Patient Name: Zackery Purdy  MRN: [id number]  Patient Class: IP- Inpatient   Admission Date: 10/6/2023  Length of Stay: 4 days  Attending Physician: Saad Lilly MD  Primary Care Provider: Carol, Primary Doctor        Subjective:     Principal Problem:Severe sepsis        HPI:  The patient is a 89 year-old female sent from the nursing home for concern of dehydration. She has had diarrhea and vomiting all day at the nursing home. She was found to be hypotensive and was sent out of concern for sepsis. She was started on Rocephin and metronidazole. She voices that she has pain in her legs but otherwise no complaints. There's no chest pain or shortness of breath. No fever or chills noted. No dysuria but she does have evidence of a urinary tract infection. Lactate is 2.5. Of note she does take MgO2 at the NH daily and this could be contributing to her diarrhea.           Past Medical History:   Diagnosis Date    Anemia, unspecified      Anorexia      Blindness      Constipation      Coronary artery disease      GERD (gastroesophageal reflux disease)      Gout, unspecified      Heart failure, unspecified       hypertensive heart disease related    Hypertension      Insomnia      Intervertebral disc disorder with myelopathy, lumbar region      Mixed hyperlipidemia      Mixed hyperlipidemia      NSTEMI (non-ST elevated myocardial infarction)      Pruritus      Small bowel obstruction      Syndrome of inappropriate ADH (SIADH) secretion      Unspecified glaucoma       bilat.    Unspecified glaucoma        Overview/Hospital Course:  10/06/2023 patient resting comfortably tolerating antibiotics.  Cultures are pending.  She is very weak but thinks she can perform some physical therapy.  Right heel shows unstageable decubitus Dr. Lalit Jackson has been consulted.  10/07/2023 elderly female from the nursing home in 4 dehydration as well as  sepsis.  She is been found to have unstageable heel decubitus with surgery consult.  Today she is noted to be significantly anemic probably due to dilution as well as being chronically anemic.  Order anemia workup and continue her antibiotics and await the surgery consult.  10/08/2023 patient awaiting surgical debridement of her heel decubitus.  She is responding well to treatment for sepsis.  Anemia is slightly improved.  10/09/2023 patient postop day 1 for surgical debridement of heel decubitus.  Still on antibiotics for sepsis.  Awaiting stool for occult blood to workup her anemia.  10/10/2023 postop day 2. For surgical debridement of heel decubitus.  She is on antibiotics for the decubitus as well as sepsis.  White blood cell count is trending up.  Cultures are growing out Klebsiella.  Pharmacy has recommended changing Zosyn to cefepime which we will do.  Repeat some labs tomorrow.      Interval History:     Review of Systems   Constitutional:  Positive for appetite change, fatigue and fever. Negative for activity change, chills and diaphoresis.   HENT:  Negative for congestion, ear pain, rhinorrhea and sore throat.    Eyes:  Negative for pain, discharge and redness.   Respiratory:  Negative for apnea, cough, choking, chest tightness, shortness of breath and wheezing.    Cardiovascular:  Negative for chest pain, palpitations and leg swelling.   Gastrointestinal:  Negative for abdominal distention, abdominal pain, blood in stool, constipation, diarrhea, nausea and vomiting.   Endocrine: Negative for cold intolerance, heat intolerance and polyuria.   Genitourinary:  Negative for difficulty urinating, dysuria, frequency, hematuria and pelvic pain.   Musculoskeletal:  Negative for arthralgias, back pain, gait problem, joint swelling and myalgias.   Skin:  Positive for wound. Negative for color change, pallor and rash.   Neurological:  Positive for dizziness and weakness. Negative for seizures, syncope, speech  difficulty, numbness and headaches.   Psychiatric/Behavioral:  Negative for agitation, confusion, hallucinations and sleep disturbance. The patient is not nervous/anxious.      Objective:     Vital Signs (Most Recent):  Temp: 97.6 °F (36.4 °C) (10/10/23 0312)  Pulse: 94 (10/10/23 0747)  Resp: 16 (10/10/23 0747)  BP: (!) 136/41 (10/10/23 0312)  SpO2: 100 % (10/10/23 0747) Vital Signs (24h Range):  Temp:  [96.9 °F (36.1 °C)-98.8 °F (37.1 °C)] 97.6 °F (36.4 °C)  Pulse:  [82-96] 94  Resp:  [16-20] 16  SpO2:  [94 %-100 %] 100 %  BP: (110-143)/(41-63) 136/41     Weight: 53.6 kg (118 lb 2.7 oz)  Body mass index is 19.07 kg/m².    Intake/Output Summary (Last 24 hours) at 10/10/2023 0749  Last data filed at 10/10/2023 0627  Gross per 24 hour   Intake 1100 ml   Output 575 ml   Net 525 ml           Physical Exam  Constitutional:       General: She is not in acute distress.     Appearance: Normal appearance. She is ill-appearing. She is not toxic-appearing or diaphoretic.   HENT:      Head: Normocephalic and atraumatic.      Right Ear: External ear normal.      Left Ear: External ear normal.      Nose: Nose normal. No congestion or rhinorrhea.      Mouth/Throat:      Mouth: Mucous membranes are dry.      Pharynx: Oropharynx is clear.   Eyes:      Extraocular Movements: Extraocular movements intact.      Conjunctiva/sclera: Conjunctivae normal.      Pupils: Pupils are equal, round, and reactive to light.   Neck:      Vascular: No carotid bruit.   Cardiovascular:      Rate and Rhythm: Normal rate and regular rhythm.      Pulses: Normal pulses.      Heart sounds: Normal heart sounds. No murmur heard.  Pulmonary:      Effort: Pulmonary effort is normal. No respiratory distress.      Breath sounds: Normal breath sounds. No wheezing, rhonchi or rales.   Abdominal:      General: Abdomen is flat. Bowel sounds are normal. There is no distension.      Palpations: Abdomen is soft.      Tenderness: There is no abdominal tenderness. There  "is no guarding.   Musculoskeletal:         General: No swelling or tenderness. Normal range of motion.      Cervical back: Normal range of motion and neck supple. No tenderness.      Right lower leg: No edema.      Left lower leg: No edema.   Lymphadenopathy:      Cervical: No cervical adenopathy.   Skin:     General: Skin is warm and dry.      Coloration: Skin is not jaundiced or pale.      Findings: Lesion present.   Neurological:      General: No focal deficit present.      Mental Status: She is alert and oriented to person, place, and time. Mental status is at baseline.      Cranial Nerves: No cranial nerve deficit.      Motor: Weakness present.      Coordination: Coordination abnormal.      Gait: Gait abnormal.   Psychiatric:         Mood and Affect: Mood normal.         Behavior: Behavior normal.         Thought Content: Thought content normal.         Judgment: Judgment normal.             Significant Labs: All pertinent labs within the past 24 hours have been reviewed.    Significant Imaging: I have reviewed all pertinent imaging results/findings within the past 24 hours.      Assessment/Plan:      * Severe sepsis  This patient does have evidence of infective focus  My overall impression is sepsis.  Source: Urinary Tract  Antibiotics given-   Antibiotics (72h ago, onward)    Start     Stop Route Frequency Ordered    10/09/23 1600  piperacillin-tazobactam (ZOSYN) 4.5 g in dextrose 5 % in water (D5W) 100 mL IVPB (MB+)         -- IV Every 8 hours (non-standard times) 10/09/23 0924    10/06/23 0900  mupirocin 2 % ointment         10/11/23 0859 Nasl 2 times daily 10/06/23 0619        Latest lactate reviewed-  No results for input(s): "LACTATE" in the last 72 hours.  Organ dysfunction indicated by Acute kidney injury    Fluid challenge Ideal Body Weight- The patient's ideal body weight is Ideal body weight: 59.3 kg (130 lb 11.7 oz) which will be used to calculate fluid bolus of 30 ml/kg for treatment of septic " shock.      Post- resuscitation assessment Yes Perfusion exam was performed within 6 hours of septic shock presentation after bolus shows Adequate tissue perfusion assessed by non-invasive monitoring       Will Not start Pressors- Levophed for MAP of 65  10/10/2023 DC Zosyn and start cefepime      Decubitus ulcer of heel  Continue antibiotics.  Consult Dr. Paulino.      Dehydration  IV fluids      BETHANY (acute kidney injury)  Patient with acute kidney injury/acute renal failure likely due to pre-renal azotemia due to IVVD BETHANY is currently stable. Baseline creatinine unknown - Labs reviewed- Renal function/electrolytes with Estimated Creatinine Clearance: 26.5 mL/min (based on SCr of 1.17 mg/dL). according to latest data. Monitor urine output and serial BMP and adjust therapy as needed. Avoid nephrotoxins and renally dose meds for GFR listed above.    Coronary artery disease involving coronary bypass graft of native heart    Continue current medications    Normocytic anemia  Anemia workup.  IV iron.  As she is losing blood from her debridement wound we will give her 1 unit of packed red blood cells        VTE Risk Mitigation (From admission, onward)         Ordered     enoxaparin injection 30 mg  Daily         10/06/23 0514     IP VTE HIGH RISK PATIENT  Once         10/06/23 0514                Discharge Planning   SANTINO:      Code Status: DNR   Is the patient medically ready for discharge?:     Reason for patient still in hospital (select all that apply): Patient unstable  Discharge Plan A: Return to nursing home   Discharge Delays: None known at this time              Tanner Moncada MD  Department of Hospital Medicine   [facility]/Surg

## 2023-10-11 NOTE — PLAN OF CARE
10/11/23 1321   Discharge Reassessment   Assessment Type Discharge Planning Reassessment   Did the patient's condition or plan change since previous assessment? No   Discharge Plan discussed with: Patient   Communicated SANTINO with patient/caregiver Date not available/Unable to determine   Discharge Plan A Return to nursing home   DME Needed Upon Discharge  none   Transition of Care Barriers None   Why the patient remains in the hospital Requires continued medical care   Post-Acute Status   Discharge Delays None known at this time

## 2023-10-11 NOTE — PROGRESS NOTES
[facility]/Surg  [facility] Medicine  Progress Note    Patient Name: Zackery Purdy  MRN: [id number]  Patient Class: IP- Inpatient   Admission Date: 10/6/2023  Length of Stay: 5 days  Attending Physician: Saad Lilly MD  Primary Care Provider: Carol, Primary Doctor        Subjective:     Principal Problem:Severe sepsis        HPI:  The patient is a 89 year-old female sent from the nursing home for concern of dehydration. She has had diarrhea and vomiting all day at the nursing home. She was found to be hypotensive and was sent out of concern for sepsis. She was started on Rocephin and metronidazole. She voices that she has pain in her legs but otherwise no complaints. There's no chest pain or shortness of breath. No fever or chills noted. No dysuria but she does have evidence of a urinary tract infection. Lactate is 2.5. Of note she does take MgO2 at the NH daily and this could be contributing to her diarrhea.           Past Medical History:   Diagnosis Date    Anemia, unspecified      Anorexia      Blindness      Constipation      Coronary artery disease      GERD (gastroesophageal reflux disease)      Gout, unspecified      Heart failure, unspecified       hypertensive heart disease related    Hypertension      Insomnia      Intervertebral disc disorder with myelopathy, lumbar region      Mixed hyperlipidemia      Mixed hyperlipidemia      NSTEMI (non-ST elevated myocardial infarction)      Pruritus      Small bowel obstruction      Syndrome of inappropriate ADH (SIADH) secretion      Unspecified glaucoma       bilat.    Unspecified glaucoma        Overview/Hospital Course:  10/06/2023 patient resting comfortably tolerating antibiotics.  Cultures are pending.  She is very weak but thinks she can perform some physical therapy.  Right heel shows unstageable decubitus Dr. Lalit Jackson has been consulted.  10/07/2023 elderly female from the nursing home in 4 dehydration as well as  sepsis.  She is been found to have unstageable heel decubitus with surgery consult.  Today she is noted to be significantly anemic probably due to dilution as well as being chronically anemic.  Order anemia workup and continue her antibiotics and await the surgery consult.  10/08/2023 patient awaiting surgical debridement of her heel decubitus.  She is responding well to treatment for sepsis.  Anemia is slightly improved.  10/09/2023 patient postop day 1 for surgical debridement of heel decubitus.  Still on antibiotics for sepsis.  Awaiting stool for occult blood to workup her anemia.  10/10/2023 postop day 2. For surgical debridement of heel decubitus.  She is on antibiotics for the decubitus as well as sepsis.  White blood cell count is trending up.  Cultures are growing out Klebsiella.  Pharmacy has recommended changing Zosyn to cefepime which we will do.  Repeat some labs tomorrow.  10/11/2023 patient resting comfortably.  She is day 3 from surgical debridement of heel decubitus.  She is receiving Maxipime for the urine culture and to help with the wound.  White blood cell count is still elevated.        Interval History:     Review of Systems   Constitutional:  Positive for appetite change, fatigue and fever. Negative for activity change, chills and diaphoresis.   HENT:  Negative for congestion, ear pain, rhinorrhea and sore throat.    Eyes:  Negative for pain, discharge and redness.   Respiratory:  Negative for apnea, cough, choking, chest tightness, shortness of breath and wheezing.    Cardiovascular:  Negative for chest pain, palpitations and leg swelling.   Gastrointestinal:  Negative for abdominal distention, abdominal pain, blood in stool, constipation, diarrhea, nausea and vomiting.   Endocrine: Negative for cold intolerance, heat intolerance and polyuria.   Genitourinary:  Negative for difficulty urinating, dysuria, frequency, hematuria and pelvic pain.   Musculoskeletal:  Negative for arthralgias, back  pain, gait problem, joint swelling and myalgias.   Skin:  Positive for wound. Negative for color change, pallor and rash.   Neurological:  Positive for dizziness and weakness. Negative for seizures, syncope, speech difficulty, numbness and headaches.   Psychiatric/Behavioral:  Negative for agitation, confusion, hallucinations and sleep disturbance. The patient is not nervous/anxious.      Objective:     Vital Signs (Most Recent):  Temp: 97.6 °F (36.4 °C) (10/11/23 0751)  Pulse: 94 (10/11/23 0757)  Resp: 16 (10/11/23 0757)  BP: (!) 176/86 (10/11/23 0751)  SpO2: 97 % (10/11/23 0757) Vital Signs (24h Range):  Temp:  [97 °F (36.1 °C)-99.1 °F (37.3 °C)] 97.6 °F (36.4 °C)  Pulse:  [77-98] 94  Resp:  [16-20] 16  SpO2:  [96 %-100 %] 97 %  BP: (122-176)/(44-86) 176/86     Weight: 53.6 kg (118 lb 2.7 oz)  Body mass index is 19.07 kg/m².    Intake/Output Summary (Last 24 hours) at 10/11/2023 0801  Last data filed at 10/11/2023 0611  Gross per 24 hour   Intake 2501.25 ml   Output 950 ml   Net 1551.25 ml           Physical Exam  Constitutional:       General: She is not in acute distress.     Appearance: Normal appearance. She is ill-appearing. She is not toxic-appearing or diaphoretic.   HENT:      Head: Normocephalic and atraumatic.      Right Ear: External ear normal.      Left Ear: External ear normal.      Nose: Nose normal. No congestion or rhinorrhea.      Mouth/Throat:      Mouth: Mucous membranes are dry.      Pharynx: Oropharynx is clear.   Eyes:      Extraocular Movements: Extraocular movements intact.      Conjunctiva/sclera: Conjunctivae normal.      Pupils: Pupils are equal, round, and reactive to light.   Neck:      Vascular: No carotid bruit.   Cardiovascular:      Rate and Rhythm: Normal rate and regular rhythm.      Pulses: Normal pulses.      Heart sounds: Normal heart sounds. No murmur heard.  Pulmonary:      Effort: Pulmonary effort is normal. No respiratory distress.      Breath sounds: Normal breath  "sounds. No wheezing, rhonchi or rales.   Abdominal:      General: Abdomen is flat. Bowel sounds are normal. There is no distension.      Palpations: Abdomen is soft.      Tenderness: There is no abdominal tenderness. There is no guarding.   Musculoskeletal:         General: No swelling or tenderness. Normal range of motion.      Cervical back: Normal range of motion and neck supple. No tenderness.      Right lower leg: No edema.      Left lower leg: No edema.   Lymphadenopathy:      Cervical: No cervical adenopathy.   Skin:     General: Skin is warm and dry.      Coloration: Skin is not jaundiced or pale.      Findings: Lesion present.   Neurological:      General: No focal deficit present.      Mental Status: She is alert and oriented to person, place, and time. Mental status is at baseline.      Cranial Nerves: No cranial nerve deficit.      Motor: Weakness present.      Coordination: Coordination abnormal.      Gait: Gait abnormal.   Psychiatric:         Mood and Affect: Mood normal.         Behavior: Behavior normal.         Thought Content: Thought content normal.         Judgment: Judgment normal.             Significant Labs: All pertinent labs within the past 24 hours have been reviewed.    Significant Imaging: I have reviewed all pertinent imaging results/findings within the past 24 hours.      Assessment/Plan:      * Severe sepsis  This patient does have evidence of infective focus  My overall impression is sepsis.  Source: Urinary Tract  Antibiotics given-   Antibiotics (72h ago, onward)    Start     Stop Route Frequency Ordered    10/10/23 0900  ceFEPIme (MAXIPIME) 1 g in dextrose 5 % in water (D5W) 100 mL IVPB (MB+)         -- IV Every 12 hours (non-standard times) 10/10/23 0801        Latest lactate reviewed-  No results for input(s): "LACTATE" in the last 72 hours.  Organ dysfunction indicated by Acute kidney injury    Fluid challenge Ideal Body Weight- The patient's ideal body weight is Ideal body " weight: 59.3 kg (130 lb 11.7 oz) which will be used to calculate fluid bolus of 30 ml/kg for treatment of septic shock.      Post- resuscitation assessment Yes Perfusion exam was performed within 6 hours of septic shock presentation after bolus shows Adequate tissue perfusion assessed by non-invasive monitoring       Will Not start Pressors- Levophed for MAP of 65  10/10/2023 DC Zosyn and start cefepime      Decubitus ulcer of heel  Continue antibiotics.  Consult Dr. Paulino.      Dehydration  IV fluids      BETHANY (acute kidney injury)  Patient with acute kidney injury/acute renal failure likely due to pre-renal azotemia due to IVVD BETHANY is currently stable. Baseline creatinine unknown - Labs reviewed- Renal function/electrolytes with Estimated Creatinine Clearance: 32.3 mL/min (based on SCr of 0.96 mg/dL). according to latest data. Monitor urine output and serial BMP and adjust therapy as needed. Avoid nephrotoxins and renally dose meds for GFR listed above.    Coronary artery disease involving coronary bypass graft of native heart    Continue current medications    Normocytic anemia  Anemia workup.  IV iron.  As she is losing blood from her debridement wound we will give her 1 unit of packed red blood cells        VTE Risk Mitigation (From admission, onward)         Ordered     enoxaparin injection 30 mg  Daily         10/06/23 0514     IP VTE HIGH RISK PATIENT  Once         10/06/23 0514                Discharge Planning   SANTINO: 10/13/2023     Code Status: DNR   Is the patient medically ready for discharge?:     Reason for patient still in hospital (select all that apply): Patient unstable  Discharge Plan A: Return to nursing home   Discharge Delays: None known at this time              Tanner Moncada MD  Department of Hospital Medicine   [facility]/Surg

## 2023-10-11 NOTE — ASSESSMENT & PLAN NOTE
Patient with acute kidney injury/acute renal failure likely due to pre-renal azotemia due to IVVD BETHANY is currently stable. Baseline creatinine unknown - Labs reviewed- Renal function/electrolytes with Estimated Creatinine Clearance: 32.3 mL/min (based on SCr of 0.96 mg/dL). according to latest data. Monitor urine output and serial BMP and adjust therapy as needed. Avoid nephrotoxins and renally dose meds for GFR listed above.

## 2023-10-11 NOTE — PROGRESS NOTES
Pharmacokinetic Initial Assessment: IV Vancomycin    Assessment/Plan:    Initiate intravenous vancomycin with loading dose of 1250 mg once followed by a maintenance dose of vancomycin 750mg IV every 24 hours  Desired empiric serum trough concentration is 10 to 15 mcg/mL  Draw vancomycin trough level 60 min prior to third dose on Friday 10/13/23 at approximately -0900  Pharmacy will continue to follow and monitor vancomycin.      Please contact pharmacy at extension [phone] with any questions regarding this assessment.     Thank you for the consult,   Waldemar Leonievladislav       Patient brief summary:  Zackery Purdy is a 89 y.o. female initiated on antimicrobial therapy with IV Vancomycin for treatment of suspected skin & soft tissue infection    Drug Allergies:   Review of patient's allergies indicates:   Allergen Reactions    Amlodipine     Coconut Nausea And Vomiting    Tree nuts Nausea And Vomiting     Patient reports vomiting with almonds and cashews.        Actual Body Weight:   53.6 Kg    Renal Function:   Estimated Creatinine Clearance: 32.3 mL/min (based on SCr of 0.96 mg/dL).,     Dialysis Method (if applicable):  N/A    CBC (last 72 hours):  Recent Labs   Lab Result Units 10/09/23  0512 10/10/23  0446 10/11/23  0446   WBC x10(3)/mcL 12.79* 18.82* 20.12*   Hgb g/dL 7.6* 7.2* 9.3*   Hct % 23.3* 21.7* 27.5*   Platelet x10(3)/mcL 248 223 205   Mono % % 6.6 5.0 5.2   Eos % % 0.5* 1.8 1.2   Basophil % % 0.2 0.2 0.2       Metabolic Panel (last 72 hours):  Recent Labs   Lab Result Units 10/09/23  0512 10/10/23  0446 10/11/23  0446   Sodium Level mmol/L 150* 148* 150*   Potassium Level mmol/L 3.3* 3.5 3.5   Chloride mmol/L 123* 126* 129*   Carbon Dioxide mmol/L 16* 17* 16*   Glucose Level mg/dL 77 79 92   Blood Urea Nitrogen mg/dL 32.0* 25.0* 22.0*   Creatinine mg/dL 1.26* 1.17 0.96   Albumin Level g/dL 2.4* 2.3* 2.4*   Bilirubin Total mg/dL 0.6 0.6 0.5   Alkaline Phosphatase unit/L 136 146* 154*   Aspartate  "Aminotransferase unit/L 50* 46* 42*   Alanine Aminotransferase unit/L 45 40 41   Magnesium Level mg/dL 2.30 2.20 2.30   Phosphorus Level mg/dL 3.2 2.5 2.8       Drug levels (last 3 results):  No results for input(s): "VANCOMYCINRA", "VANCORANDOM", "VANCOMYCINPE", "VANCOPEAK", "VANCOMYCINTR", "VANCOTROUGH" in the last 72 hours.    Microbiologic Results:  Microbiology Results (last 7 days)       Procedure Component Value Units Date/Time    Blood Culture x two cultures. Draw prior to antibiotics [[id number]] Collected: 10/06/23 0300    Order Status: Completed Specimen: Blood from Wrist, Left Updated: 10/11/23 0405     CULTURE, BLOOD ([facility]) No Growth at 5 days    Blood Culture x two cultures. Draw prior to antibiotics [[id number]] Collected: 10/06/23 0310    Order Status: Completed Specimen: Blood from Hand, Right Updated: 10/11/23 0405     CULTURE, BLOOD ([facility]) No Growth at 5 days    Urine culture [[id number]]  (Abnormal)  (Susceptibility) Collected: 10/06/23 0126    Order Status: Completed Specimen: Urine Updated: 10/08/23 0637     Urine Culture >/= 100,000 colonies/ml Klebsiella pneumoniae ssp pneumoniae    C Diff Toxin by PCR [[id number]]  (Normal) Collected: 10/06/23 0126    Order Status: Completed Specimen: Stool Updated: 10/06/23 0253     Clostridium difficile toxin PCR Not Detected    Narrative:      The Portrait Toxigenic C. difficile assay has not been   evaluated in patients that are <2 years of age.                "

## 2023-10-11 NOTE — PROGRESS NOTES
[facility]-Med/Surg  Wound Care    Patient Name:  Zackery Purdy   MRN:  [id number]  Date: 10/11/2023  Diagnosis: Severe sepsis    History:     Past Medical History:   Diagnosis Date    Anemia, unspecified     Anorexia     Blindness     Constipation     Coronary artery disease     Decubitus ulcer of heel 10/6/2023    GERD (gastroesophageal reflux disease)     Gout, unspecified     Heart failure, unspecified     hypertensive heart disease related    Hypertension     Insomnia     Intervertebral disc disorder with myelopathy, lumbar region     Mixed hyperlipidemia     Mixed hyperlipidemia     NSTEMI (non-ST elevated myocardial infarction)     Pruritus     Small bowel obstruction     Syndrome of inappropriate ADH (SIADH) secretion     Unspecified glaucoma     bilat.    Unspecified glaucoma        Social History     Socioeconomic History    Marital status:    Tobacco Use    Smoking status: Never    Smokeless tobacco: Never   Substance and Sexual Activity    Alcohol use: Never    Drug use: Never    Sexual activity: Not Currently     Social Determinants of Health     Financial Resource Strain: Low Risk  (10/6/2023)    Overall Financial Resource Strain (CARDIA)     Difficulty of Paying Living Expenses: Not hard at all   Food Insecurity: No Food Insecurity (10/6/2023)    Hunger Vital Sign     Worried About Running Out of Food in the Last Year: Never true     Ran Out of Food in the Last Year: Never true   Transportation Needs: No Transportation Needs (10/6/2023)    PRAPARE - Transportation     Lack of Transportation (Medical): No     Lack of Transportation (Non-Medical): No   Physical Activity: Inactive (10/6/2023)    Exercise Vital Sign     Days of Exercise per Week: 0 days     Minutes of Exercise per Session: 0 min   Stress: No Stress Concern Present (10/6/2023)    [employer] [place] [employer] - Occupational Stress Questionnaire     Feeling of Stress : Not at all   Social Connections: Socially  Isolated (10/6/2023)    Social Connection and Isolation Panel [NHANES]     Frequency of Communication with Friends and Family: Once a week     Frequency of Social Gatherings with Friends and Family: Once a week     Attends L[employer] day Services: 1 to 4 times per year     Active Member of Clubs or Organizations: No     Attends Club or Organization Meetings: Never     Marital Status:    Housing Stability: Low Risk  (10/6/2023)    Housing Stability Vital Sign     Unable to Pay for Housing in the Last Year: No     Number of Places Lived in the Last Year: 1     Unstable Housing in the Last Year: No       Precautions:     Allergies as of 10/06/2023 - Reviewed 10/06/2023   Allergen Reaction Noted    Amlodipine  10/06/2023       WOC Assessment Details/Treatment        10/11/23 1130   WOCN Assessment   WOCN Total Time (mins) 45   Visit Date 10/11/23   Visit Time 1100   Consult Type Follow Up   WOCN Speciality Wound   Intervention assessed;applied;changed   Teaching on-going   Skin Interventions   Pressure Reduction Devices pressure-redistributing mattress utilized;heel offloading device utilized   Pressure Reduction Techniques weight shift assistance provided;heels elevated off bed   Positioning   Body Position turned;left   Positioning/Transfer Devices pillows;wedge   Pressure Injury Prevention    Check Moisture Management Pad Done   Heel protection technique Heel boot        Altered Skin Integrity 10/06/23 0500 Right Buttocks Partial thickness tissue loss. Shallow open ulcer with a red or pink wound bed, without slough. Intact or Open/Ruptured Serum-filled blister.   Date First Assessed/Time First Assessed: 10/06/23 0500   Altered Skin Integrity Present on Admission - Did Patient arrive to the hospital with altered skin?: yes  Side: Right  Location: Buttocks  Description of Altered Skin Integrity: Partial thicknes...   Wound Image    Description of Altered Skin Integrity Partial thickness tissue loss. Shallow open  ulcer with a red or pink wound bed, without slough. Intact or Open/Ruptured Serum-filled blister.   Dressing Appearance Dry;Intact;Clean   Drainage Amount None   Drainage Characteristics/Odor No odor   Appearance Moist  (pale yellow.  The second injury that measured 0.3 cm x 0.3 cm x 0.1 cm is resolved.)   Periwound Area Intact   Wound Edges Defined   Wound Length (cm) 0.5 cm   Wound Width (cm) 0.4 cm   Wound Depth (cm) 0.1 cm   Wound Volume (cm^3) 0.02 cm^3   Wound Surface Area (cm^2) 0.2 cm^2   Care Cleansed with:;Sterile normal saline   Dressing Foam   Dressing Change Due 10/13/23        Altered Skin Integrity 10/06/23 0500 Coccyx Partial thickness tissue loss. Shallow open ulcer with a red or pink wound bed, without slough. Intact or Open/Ruptured Serum-filled blister.   Date First Assessed/Time First Assessed: 10/06/23 0500   Altered Skin Integrity Present on Admission - Did Patient arrive to the hospital with altered skin?: yes  Location: Coccyx  Description of Altered Skin Integrity: Partial thickness tissue loss. ...   Wound Image    Description of Altered Skin Integrity Partial thickness tissue loss. Shallow open ulcer with a red or pink wound bed, without slough. Intact or Open/Ruptured Serum-filled blister.   Dressing Appearance Dry;Intact;Clean   Drainage Amount None   Drainage Characteristics/Odor No odor   Appearance [place];Dry   Periwound Area Intact   Wound Edges Defined   Wound Length (cm) 0.5 cm   Wound Width (cm) 0.3 cm   Wound Depth (cm) 0.1 cm   Wound Volume (cm^3) 0.015 cm^3   Wound Surface Area (cm^2) 0.15 cm^2   Care Cleansed with:;Sterile normal saline   Dressing Foam   Dressing Change Due 10/13/23        Altered Skin Integrity 10/06/23 0500 Right anterior Toe, second Full thickness tissue loss. Base is covered by slough and/or eschar in the wound bed   Date First Assessed/Time First Assessed: 10/06/23 0500   Altered Skin Integrity Present on Admission - Did Patient arrive to the hospital with  altered skin?: yes  Side: Right  Orientation: anterior  Location: Toe, second  Description of Altered Skin I...   Wound Image    Description of Altered Skin Integrity Full thickness tissue loss. Base is covered by slough and/or eschar in the wound bed   Dressing Appearance Open to air   Drainage Amount None   Appearance Black;Dry   Periwound Area Redness   Wound Length (cm) 1.2 cm   Wound Width (cm) 1 cm   Wound Depth (cm) 0 cm   Wound Volume (cm^3) 0 cm^3   Wound Surface Area (cm^2) 1.2 cm^2   Care Applied:;Skin Barrier   Dressing   (left open to air)   Dressing Change Due 10/14/23        Incision/Site 10/08/23 1624 Right Heel other (see comments)   Date First Assessed/Time First Assessed: 10/08/23 1624   Present Prior to Hospital Arrival?: No  Side: Right  Location: Heel  Incision Type: (c) other (see comments)   Wound Image    Dressing Appearance Dry;Intact;Clean   Drainage Amount Small   Drainage Characteristics/Odor Serosanguineous;No odor   Appearance Red;Moist   Red (%), Wound Tissue Color 100 %   Periwound Area Pale white;Purple   Wound Edges Defined   Wound Length (cm) 3.9 cm   Wound Width (cm) 4 cm   Wound Depth (cm) 0.2 cm   Wound Volume (cm^3) 3.12 cm^3   Wound Surface Area (cm^2) 15.6 cm^2   Care Cleansed with:;Sterile normal saline;Applied:;Skin Barrier   Dressing Applied  (saline m oist gauze to wound bed then covered with gauze, kerlix and ace wrap.)   Packing Inserted  1   Packing Removed 1   Periwound Care Skin barrier film applied   Dressing Change Due 10/11/23  (pm shift)        10/11/2023

## 2023-10-11 NOTE — SUBJECTIVE & OBJECTIVE
Interval History:     Review of Systems   Constitutional:  Positive for appetite change, fatigue and fever. Negative for activity change, chills and diaphoresis.   HENT:  Negative for congestion, ear pain, rhinorrhea and sore throat.    Eyes:  Negative for pain, discharge and redness.   Respiratory:  Negative for apnea, cough, choking, chest tightness, shortness of breath and wheezing.    Cardiovascular:  Negative for chest pain, palpitations and leg swelling.   Gastrointestinal:  Negative for abdominal distention, abdominal pain, blood in stool, constipation, diarrhea, nausea and vomiting.   Endocrine: Negative for cold intolerance, heat intolerance and polyuria.   Genitourinary:  Negative for difficulty urinating, dysuria, frequency, hematuria and pelvic pain.   Musculoskeletal:  Negative for arthralgias, back pain, gait problem, joint swelling and myalgias.   Skin:  Positive for wound. Negative for color change, pallor and rash.   Neurological:  Positive for dizziness and weakness. Negative for seizures, syncope, speech difficulty, numbness and headaches.   Psychiatric/Behavioral:  Negative for agitation, confusion, hallucinations and sleep disturbance. The patient is not nervous/anxious.      Objective:     Vital Signs (Most Recent):  Temp: 97.6 °F (36.4 °C) (10/11/23 0751)  Pulse: 94 (10/11/23 0757)  Resp: 16 (10/11/23 0757)  BP: (!) 176/86 (10/11/23 0751)  SpO2: 97 % (10/11/23 0757) Vital Signs (24h Range):  Temp:  [97 °F (36.1 °C)-99.1 °F (37.3 °C)] 97.6 °F (36.4 °C)  Pulse:  [77-98] 94  Resp:  [16-20] 16  SpO2:  [96 %-100 %] 97 %  BP: (122-176)/(44-86) 176/86     Weight: 53.6 kg (118 lb 2.7 oz)  Body mass index is 19.07 kg/m².    Intake/Output Summary (Last 24 hours) at 10/11/2023 0801  Last data filed at 10/11/2023 0611  Gross per 24 hour   Intake 2501.25 ml   Output 950 ml   Net 1551.25 ml           Physical Exam  Constitutional:       General: She is not in acute distress.     Appearance: Normal  appearance. She is ill-appearing. She is not toxic-appearing or diaphoretic.   HENT:      Head: Normocephalic and atraumatic.      Right Ear: External ear normal.      Left Ear: External ear normal.      Nose: Nose normal. No congestion or rhinorrhea.      Mouth/Throat:      Mouth: Mucous membranes are dry.      Pharynx: Oropharynx is clear.   Eyes:      Extraocular Movements: Extraocular movements intact.      Conjunctiva/sclera: Conjunctivae normal.      Pupils: Pupils are equal, round, and reactive to light.   Neck:      Vascular: No carotid bruit.   Cardiovascular:      Rate and Rhythm: Normal rate and regular rhythm.      Pulses: Normal pulses.      Heart sounds: Normal heart sounds. No murmur heard.  Pulmonary:      Effort: Pulmonary effort is normal. No respiratory distress.      Breath sounds: Normal breath sounds. No wheezing, rhonchi or rales.   Abdominal:      General: Abdomen is flat. Bowel sounds are normal. There is no distension.      Palpations: Abdomen is soft.      Tenderness: There is no abdominal tenderness. There is no guarding.   Musculoskeletal:         General: No swelling or tenderness. Normal range of motion.      Cervical back: Normal range of motion and neck supple. No tenderness.      Right lower leg: No edema.      Left lower leg: No edema.   Lymphadenopathy:      Cervical: No cervical adenopathy.   Skin:     General: Skin is warm and dry.      Coloration: Skin is not jaundiced or pale.      Findings: Lesion present.   Neurological:      General: No focal deficit present.      Mental Status: She is alert and oriented to person, place, and time. Mental status is at baseline.      Cranial Nerves: No cranial nerve deficit.      Motor: Weakness present.      Coordination: Coordination abnormal.      Gait: Gait abnormal.   Psychiatric:         Mood and Affect: Mood normal.         Behavior: Behavior normal.         Thought Content: Thought content normal.         Judgment: Judgment normal.              Significant Labs: All pertinent labs within the past 24 hours have been reviewed.    Significant Imaging: I have reviewed all pertinent imaging results/findings within the past 24 hours.

## 2023-10-11 NOTE — ASSESSMENT & PLAN NOTE
"This patient does have evidence of infective focus  My overall impression is sepsis.  Source: Urinary Tract  Antibiotics given-   Antibiotics (72h ago, onward)    Start     Stop Route Frequency Ordered    10/10/23 0900  ceFEPIme (MAXIPIME) 1 g in dextrose 5 % in water (D5W) 100 mL IVPB (MB+)         -- IV Every 12 hours (non-standard times) 10/10/23 0801        Latest lactate reviewed-  No results for input(s): "LACTATE" in the last 72 hours.  Organ dysfunction indicated by Acute kidney injury    Fluid challenge Ideal Body Weight- The patient's ideal body weight is Ideal body weight: 59.3 kg (130 lb 11.7 oz) which will be used to calculate fluid bolus of 30 ml/kg for treatment of septic shock.      Post- resuscitation assessment Yes Perfusion exam was performed within 6 hours of septic shock presentation after bolus shows Adequate tissue perfusion assessed by non-invasive monitoring       Will Not start Pressors- Levophed for MAP of 65  10/10/2023 DC Zosyn and start cefepime    "

## 2023-10-11 NOTE — PT/OT/SLP PROGRESS
Physical Therapy Treatment    Patient Name:  Zackery Purdy   MRN:  [id number]    Recommendations:     Discharge Recommendations: nursing facility, skilled  Discharge Equipment Recommendations: wheelchair, hospital bed  Barriers to discharge: None    Assessment:     Zackery Purdy is a 89 y.o. female admitted with a medical diagnosis of Severe sepsis.  She presents with the following impairments/functional limitations: weakness, impaired endurance, impaired functional mobility.    Patient tolerated supine to sit requiring mod/max A to get EOB and sat up for 15 minutes with SBA. Min A needed at times to correct posture as she fatigued.  Patient performed some BLE therex while sitting EOB, but fatigued quickly. Required max A for sit to supine and max A for scooting.    Rehab Prognosis: Good; patient would benefit from acute skilled PT services to address these deficits and reach maximum level of function.    Recent Surgery: Procedure(s) (LRB):  DEBRIDEMENT, WOUND (Right) 3 Days Post-Op    Plan:     During this hospitalization, patient to be seen 5 x/week (5-6x weekly/1-2x daily) to address the identified rehab impairments via therapeutic activities, therapeutic exercises and progress toward the following goals:    Plan of Care Expires:  11/06/23    Subjective     Chief Complaint: weakness  Patient/Family Comments/goals: improve strength  Pain/Comfort:         Objective:     Communicated with nurse prior to session.  Patient found HOB elevated with peripheral IV, ratliff catheter upon PT entry to room.     General Precautions: Standard, fall  Orthopedic Precautions: N/A  Braces: N/A  Respiratory Status: Room air     Functional Mobility:  Bed Mobility:     Scooting: maximal assistance  Supine to Sit: moderate assistance  Sit to Supine: maximal assistance  Balance: sitting SBA-min A          Patient left right sidelying with all lines intact, call button in reach, and bed alarm on..    GOALS:   Multidisciplinary Problems        Physical Therapy Goals          Problem: Physical Therapy    Goal Priority Disciplines Outcome Goal Variances Interventions   Physical Therapy Goal     PT, PT/OT Ongoing, Progressing     Description: Goals to be met by: discharge     Patient will increase functional independence with mobility by performin. Supine to sit with MInimal Assistance  2. Sit to stand transfer with Minimal Assistance  3. Bed to chair transfer with Minimal Assistance using No Assistive Device                         Time Tracking:     PT Received On: 10/11/23  PT Start Time: 1355     PT Stop Time: 1420  PT Total Time (min): 25 min     Billable Minutes: Therapeutic Activity 15 and Therapeutic Exercise 10    Treatment Type: Treatment  PT/PTA: PT           10/11/2023

## 2023-10-12 NOTE — PLAN OF CARE
Physician ordered to discharge patient to [facility]. [facility] was notified per phone/fax of pt's discharge, spoke with Bhargavi. Nursing advised to call report to [phone]. Patient to be transported per nursing home van.

## 2023-10-12 NOTE — PLAN OF CARE
Problem: Physical Therapy  Goal: Physical Therapy Goal  Description: Goals to be met by: discharge     Patient will increase functional independence with mobility by performin. Supine to sit with MInimal Assistance  2. Sit to stand transfer with Minimal Assistance  3. Bed to chair transfer with Minimal Assistance using No Assistive Device    Outcome: Adequate for Care Transition

## 2023-10-12 NOTE — DISCHARGE SUMMARY
[facility]/Surg  Hospital Medicine  Discharge Summary      Patient Name: Zackery Purdy  MRN: [id number]  JEET: [id number]  Patient Class: IP- Inpatient  Admission Date: 10/6/2023  Hospital Length of Stay: 6 days  Discharge Date and Time:  10/12/2023 3:38 PM  Attending Physician: Saad Lilly MD   Discharging Provider: Leana Dixon MD  Primary Care Provider: Carol, Primary Doctor    Primary Care Team: Networked reference to record PCT     HPI:   The patient is a 89 year-old female sent from the nursing home for concern of dehydration. She has had diarrhea and vomiting all day at the nursing home. She was found to be hypotensive and was sent out of concern for sepsis. She was started on Rocephin and metronidazole. She voices that she has pain in her legs but otherwise no complaints. There's no chest pain or shortness of breath. No fever or chills noted. No dysuria but she does have evidence of a urinary tract infection. Lactate is 2.5. Of note she does take MgO2 at the NH daily and this could be contributing to her diarrhea.           Past Medical History:   Diagnosis Date    Anemia, unspecified      Anorexia      Blindness      Constipation      Coronary artery disease      GERD (gastroesophageal reflux disease)      Gout, unspecified      Heart failure, unspecified       hypertensive heart disease related    Hypertension      Insomnia      Intervertebral disc disorder with myelopathy, lumbar region      Mixed hyperlipidemia      Mixed hyperlipidemia      NSTEMI (non-ST elevated myocardial infarction)      Pruritus      Small bowel obstruction      Syndrome of inappropriate ADH (SIADH) secretion      Unspecified glaucoma       bilat.    Unspecified glaucoma        Procedure(s) (LRB):  DEBRIDEMENT, WOUND (Right)      Hospital Course:   10/06/2023 patient resting comfortably tolerating antibiotics.  Cultures are pending.  She is very weak but thinks she can perform some physical  therapy.  Right heel shows unstageable decubitus Dr. Lalit Jackson has been consulted.  10/07/2023 elderly female from the nursing home in 4 dehydration as well as sepsis.  She is been found to have unstageable heel decubitus with surgery consult.  Today she is noted to be significantly anemic probably due to dilution as well as being chronically anemic.  Order anemia workup and continue her antibiotics and await the surgery consult.  10/08/2023 patient awaiting surgical debridement of her heel decubitus.  She is responding well to treatment for sepsis.  Anemia is slightly improved.  10/09/2023 patient postop day 1 for surgical debridement of heel decubitus.  Still on antibiotics for sepsis.  Awaiting stool for occult blood to workup her anemia.  10/10/2023 postop day 2. For surgical debridement of heel decubitus.  She is on antibiotics for the decubitus as well as sepsis.  White blood cell count is trending up.  Cultures are growing out Klebsiella.  Pharmacy has recommended changing Zosyn to cefepime which we will do.  Repeat some labs tomorrow.  10/11/2023 patient resting comfortably.  She is day 3 from surgical debridement of heel decubitus.  She is receiving Maxipime for the urine culture and to help with the wound.  White blood cell count is still elevated.  10/12/2023 pt needs iv abx, will need Rocephin  will need a PICC line to be able to get iv abx at the nursing home    Pt has had 8 days of iv abx for UTI, will d/c back to nursing home.  Pt had I&D of heel decubitus and doing well post op.  She has wound on the buttocks that is healing and was evaluated by surgery but did need debridement.  Stage 2 POA          Goals of Care Treatment Preferences:  Code Status: DNR      Consults:   Consults (From admission, onward)        Status Ordering Provider     Inpatient consult to PICC team (NIAS)  Once        Provider:  (Not yet assigned)    Ordered AL ERVIN     Inpatient consult to Registered  Dietitian/Nutritionist  Once        Provider:  (Not yet assigned)    Completed TERRY DOUGHERTY     Inpatient consult to General Surgery  Once        Provider:  Jerod Toth MD    Acknowledged REJI MUNOZ     IP consult to case management  Once        Provider:  (Not yet assigned)    Acknowledged TERRY DOUGHERTY     Inpatient virtual consult to Hospital Medicine  Once        Provider:  (Not yet assigned)    Acknowledged SINDHU GROSS          No new Assessment & Plan notes have been filed under this hospital service since the last note was generated.  Service: Hospital Medicine    Final Active Diagnoses:    Diagnosis Date Noted POA    PRINCIPAL PROBLEM:  Severe sepsis [A41.9, R65.20] 10/06/2023 Yes    BETHANY (acute kidney injury) [N17.9] 10/06/2023 Yes    Hypotension due to hypovolemia [I95.89, E86.1] 10/06/2023 Yes    Gastroenteritis [K52.9] 10/06/2023 Yes    Diarrhea [R19.7] 10/06/2023 Yes    Dehydration [E86.0] 10/06/2023 Yes    Acute cystitis without hematuria [N30.00] 10/06/2023 Yes    Decubitus ulcer of heel [L89.609] 10/06/2023 Yes    Coronary artery disease involving coronary bypass graft of native heart [I25.810]  Yes    Normocytic anemia [D64.9]  Yes      Problems Resolved During this Admission:       Discharged Condition: good    Disposition: Home or Self Care    Follow Up:    Patient Instructions:      Activity as tolerated       Significant Diagnostic Studies: Labs:   BMP:   Recent Labs   Lab 10/11/23  0446 10/12/23  0427   * 149*   K 3.5 3.2*   CO2 16* 15*   BUN 22.0* 18.0   CREATININE 0.96 0.80   CALCIUM 9.0 8.7   MG 2.30 2.10    and CMP   Recent Labs   Lab 10/11/23  0446 10/12/23  0427   * 149*   K 3.5 3.2*   CO2 16* 15*   BUN 22.0* 18.0   CREATININE 0.96 0.80   CALCIUM 9.0 8.7   ALBUMIN 2.4* 2.2*   BILITOT 0.5 0.5   ALKPHOS 154* 143   AST 42* 34   ALT 41 35       Pending Diagnostic Studies:     Procedure Component Value Units Date/Time    OCCULT BLOOD STOOL, CA  SCREEN 2ND SPEC [[id number]]     Order Status: Sent Lab Status: No result     Specimen: Stool     Occult Blood, Stool Screening (1 -3) [[id number]] Collected: 10/11/23 1141    Order Status: Sent Lab Status: In process Updated: 10/11/23 1202    Specimen: Stool     Narrative:      The following orders were created for panel order Occult Blood, Stool Screening (1 -3).  Procedure                               Abnormality         Status                     ---------                               -----------         ------                     Occult Blood Stool, CA ...[[id number]]                      Final result               OCCULT BLOOD STOOL, CA ...[[id number]]                                                   Please view results for these tests on the individual orders.    Specimen to Pathology [[id number]] Collected: 10/08/23 1550    Order Status: Sent Lab Status: In process Updated: 10/08/23 1759    Specimen: Tissue from Foot, Heel Right     Specimen to Pathology General Surgery [[id number]] Collected: 10/08/23 1643    Order Status: Sent Lab Status: No result     Specimen: Tissue          Medications:  Reconciled Home Medications:      Medication List      START taking these medications    levoFLOXacin 500 MG tablet  Commonly known as: LEVAQUIN  Take 1 tablet (500 mg total) by mouth once daily. for 5 days        CHANGE how you take these medications    minoxidiL 2.5 MG tablet  Commonly known as: LONITEN  Take 1 tablet (2.5 mg total) by mouth once daily.  What changed: medication strength        CONTINUE taking these medications    aspirin 81 MG EC tablet  Commonly known as: ECOTRIN  Take 1 tablet (81 mg total) by mouth once daily.     bisacodyL 10 mg Supp  Commonly known as: DULCOLAX  Place 10 mg rectally daily as needed.     demeclocycline 300 MG Tab  Commonly known as: DECLOMYCIN  Take 300 mg by mouth 3 (three) times daily.     dorzolamide-timolol 2-0.5% 22.3-6.8 mg/mL ophthalmic solution  Commonly known as:  COSOPT  Place 1 drop into both eyes 2 (two) times daily.     ENTRESTO  mg per tablet  Generic drug: sacubitriL-valsartan  Take 1 tablet by mouth 2 (two) times daily.     esomeprazole 40 MG capsule  Commonly known as: NEXIUM  Take 40 mg by mouth before breakfast.     hydrOXYzine pamoate 25 MG Cap  Commonly known as: VISTARIL  Take 25 mg by mouth every 6 (six) hours as needed (ITCHING RELATED TO PRURITUS).     latanoprost 0.005 % ophthalmic solution  Place 1 drop into both eyes every evening.     LINZESS 145 mcg Cap capsule  Generic drug: linaCLOtide  Take 145 mcg by mouth once daily.     losartan 25 MG tablet  Commonly known as: COZAAR  Take 25 mg by mouth once daily.     megestroL 400 mg/10 mL (40 mg/mL) Susp  Commonly known as: MEGACE  Take 10 mLs by mouth once daily.     melatonin 10 mg Tab  Take 5 mg by mouth every evening.     ondansetron 8 MG tablet  Commonly known as: ZOFRAN  Take 8 mg by mouth every 6 (six) hours as needed for Nausea.     ONE DAILY MULTIVITAMIN per tablet  Generic drug: multivitamin  Take 1 tablet by mouth once daily.     polyethylene glycol 17 gram Pwpk  Commonly known as: GLYCOLAX  Take 17 g by mouth once daily.     potassium chloride 10 MEQ Cpsr  Commonly known as: MICRO-K  Take 20 mEq by mouth once.     simvastatin 40 MG tablet  Commonly known as: ZOCOR  Take 40 mg by mouth every evening.     temazepam 7.5 MG Cap  Commonly known as: RESTORIL  Take 7.5 mg by mouth every evening.     torsemide 20 MG Tab  Commonly known as: DEMADEX  Take 20 mg by mouth once daily.     TYLENOL EXTRA STRENGTH 500 MG tablet  Generic drug: acetaminophen  Take 500 mg by mouth every 4 (four) hours as needed for Pain or Temperature greater than.     VITAMIN C 500 MG tablet  Generic drug: ascorbic acid (vitamin C)  Take 500 mg by mouth once daily.     vitamin E 400 UNIT capsule  Take 400 Units by mouth once daily.            Indwelling Lines/Drains at time of discharge:   Lines/Drains/Airways     Drain   Duration                Urethral Catheter 10/06/23 0400 6 days                Time spent on the discharge of patient: 36 minutes    Physical Exam  Constitutional:       General: She is not in acute distress.     Appearance: Normal appearance. She is normal weight. She is not ill-appearing.   Cardiovascular:      Rate and Rhythm: Normal rate and regular rhythm.      Pulses: Normal pulses.      Heart sounds: Normal heart sounds.   Pulmonary:      Effort: Pulmonary effort is normal.      Breath sounds: Normal breath sounds.   Abdominal:      General: Abdomen is flat.      Palpations: Abdomen is soft.   Skin:     General: Skin is warm and dry.      Findings: No erythema, lesion or rash.   Neurological:      Mental Status: She is alert.   Psychiatric:         Behavior: Behavior normal.      Comments: I had a face to face encounter with this patient prior to d/c          Leana Dixon MD  Department of Hospital Medicine  [facility]/Surg

## 2023-10-12 NOTE — PLAN OF CARE
10/12/23 1551   Final Note   Assessment Type Final Discharge Note   Anticipated Discharge Disposition [facility]   What phone number can be called within the next 1-3 days to see how you are doing after discharge? [phone]   Post-Acute Status   Discharge Delays None known at this time

## 2023-10-12 NOTE — PHYSICIAN QUERY
"PT Name: Zackery Purdy  MR #: [id number]     DOCUMENTATION CLARIFICATION      CDS/: Caitlin aMjor RN CDI          Contact information: [email or web address]   This form is a permanent document in the medical record.    Query Date: October 12, 2023    By submitting this query, we are merely seeking further clarification of documentation to reflect the severity of illness of your patient. Please utilize your independent clinical judgment when addressing the question(s) below.     The Medical Record contains the following:   Indicators   Supporting Clinical Findings Location in Medical Record   X Documentation of Sepsis, Septic Shock Severe sepsis source urinary tract     She is on antibiotics for the decubitus as well as sepsis.  White blood cell count is trending up.  Cultures are growing out Klebsiella.    ed 10/11 SUHA Moncada MD   X Blood Culture 10/6 x2 no growth   Lab     Respiratory Culture     X Urine Culture 10/6 Klebsiella Pneumoniae =/> 100,000 Lab       Other Culture     X Acute/Chronic Illness Severe sepsis  Decubitus ulcer of heel  Dehydration  BETHANY  CAD   [facility] 10/11 SUHA Moncada MD   X Medication/Treatment Cefepime IV    Pharmacy has recommended changing Zosyn to cefepime which we will do.     Vancomyxin IN 10/11-12    Cipro IV x1 in Er  Zosyn IV 10/6-9   [facility] 10/11 SUHA Moncada MD    Other        Provider, please further specify the  "Severe Sepsis"  diagnosis:   [ x  ] Due to K. Pneumoniae   [   ] Due to (please specify): __________________________________   [   ] Other specification (please specify): ____________________   [   ] Clinically Undetermined       Please document in your progress notes daily for the duration of treatment until resolved, and include in your discharge summary.  Form No. [id number]    "

## 2023-10-12 NOTE — PHYSICIAN QUERY
"PT Name: Zackery Purdy  MR #: [id number]     DOCUMENTATION CLARIFICATION     CDS/: Caitlin Major RN CDI           Contact information: [email or web address]   This form is a permanent document in the medical record.     Query Date: October 12, 2023    By submitting this query, we are merely seeking further clarification of documentation.  Please utilize your independent clinical judgment when addressing the question(s) below.  Provider, please provide the integumentary diagnosis related to the documentation of  " Right Buttocks":   The Medical Record contains the following:      Wound care 10/11 JACQUELINE Nunes RN  Right Buttocks - partial thickness tissue loss shallow open ulcer with a red or pink wound bed  Present on admit  0.5 cm x 1.4 cm x 0.1 cm  Cleansed with sterile normal saline foam dressing applied           The clinical guidelines noted are only a system guideline. It does not replace the providers clinical judgment.    Per the National Pressure Injury Advisory Panel:   A pressure injury is localized damage to the skin and underlying soft tissue usually over a bony prominence or related to a medical or other device. The injury can present as intact skin or an open ulcer and may be painful. The injury occurs as a result of intense and/or prolonged pressure or pressure in combination with shear. The tolerance of soft tissue for pressure and shear may also be affected by microclimate, nutrition, perfusion, co-morbidities and condition of the soft tissue.       Stage 1 Pressure Injury:  Intact skin with a localized area of non-blanchable erythema, which may appear differently in darkly pigmented skin. Color changes do not include purple or maroon discoloration; these may indicate deep tissue pressure injury.    Stage 2 Pressure Injury:  Partial-thickness loss of skin with exposed dermis. The wound bed is viable, pink or red, moist, and may also present as an intact or ruptured serum-filled blister.  " "  Stage 3 Pressure Injury:  Full-thickness loss of skin, in which adipose (fat) is visible in the ulcer and granulation tissue and epibole (rolled wound edges) are often present. Slough and/or eschar may be visible. Undermining and tunneling may occur.    Stage 4 Pressure Injury:  Full-thickness skin and tissue loss with exposed or directly palpable fascia, muscle, tendon, ligament, cartilage or bone in the ulcer. Slough and/or eschar may be visible. Epibole (rolled edges), undermining and/or tunneling often occur.    Unstageable Pressure Injury:  Full-thickness skin and tissue loss in which the extent of tissue damage within the ulcer cannot be confirmed because it is obscured by slough or eschar. If slough or eschar is removed, a Stage 3 or Stage 4 pressure injury will be revealed.    Deep Tissue Pressure Injury:  Intact or non-intact skin with localized area of persistent non-blanchable deep red, maroon, purple discoloration or epidermal separation revealing a dark wound bed or blood filled blister. This injury results from intense and/or prolonged pressure and shear forces at the bone-muscle interface. The wound may evolve rapidly to reveal the actual extent of tissue injury, or may resolve without tissue loss. If necrotic tissue, subcutaneous tissue, granulation tissue, fascia, muscle or other underlying structures are visible, this indicates a full thickness pressure injury (Unstageable, Stage 3 or Stage 4). Do not use DTPI to describe vascular, traumatic, neuropathic, or dermatologic conditions.       Provider, please provide the integumentary diagnosis related to the documentation of  "Right Buttocks":     [ x  ] Pressure Injury/Decubitus Ulcer, Stage 2     [   ] Pressure Injury/Decubitus Ulcer, other stage, specify ____________     [   ] Other Integumentary Diagnosis (please specify):______________         Please document in your progress notes daily for the duration of treatment until resolved and include " in your discharge summary.    Reference:    ANU Rodriguez., ELEAZAR Blount., Goldberg, M., ANU Hernandez, ANU Alvarado, & KATHE Echols (2016). Revised National Pressure Ulcer Advisory Panel Pressure Injury Staging System: Revised Pressure Injury Staging System. J Wound Ostomy Continence Nurs, 43(6), 585-597. doi:10.1097/won.6960175654661423    Form No.[id number]

## 2023-10-12 NOTE — PLAN OF CARE
Problem: Adult Inpatient Plan of Care  Goal: Plan of Care Review  Outcome: Met  Goal: Patient-Specific Goal (Individualized)  Outcome: Met  Goal: Absence of Hospital-Acquired Illness or Injury  Outcome: Met  Goal: Optimal Comfort and Wellbeing  Outcome: Met  Goal: Readiness for Transition of Care  Outcome: Met     Problem: Adjustment to Illness (Sepsis/Septic Shock)  Goal: Optimal Coping  Outcome: Met     Problem: Bleeding (Sepsis/Septic Shock)  Goal: Absence of Bleeding  Outcome: Met     Problem: Glycemic Control Impaired (Sepsis/Septic Shock)  Goal: Blood Glucose Level Within Desired Range  Outcome: Met     Problem: Infection Progression (Sepsis/Septic Shock)  Goal: Absence of Infection Signs and Symptoms  Outcome: Met     Problem: Nutrition Impaired (Sepsis/Septic Shock)  Goal: Optimal Nutrition Intake  Outcome: Met     Problem: Fluid and Electrolyte Imbalance (Acute Kidney Injury/Impairment)  Goal: Fluid and Electrolyte Balance  Outcome: Met     Problem: Oral Intake Inadequate (Acute Kidney Injury/Impairment)  Goal: Optimal Nutrition Intake  Outcome: Met     Problem: Renal Function Impairment (Acute Kidney Injury/Impairment)  Goal: Effective Renal Function  Outcome: Met     Problem: UTI (Urinary Tract Infection)  Goal: Improved Infection Symptoms  Outcome: Met     Problem: Diarrhea  Goal: Fluid and Electrolyte Balance  Outcome: Met     Problem: Fall Injury Risk  Goal: Absence of Fall and Fall-Related Injury  Outcome: Met     Problem: Skin Injury Risk Increased  Goal: Skin Health and Integrity  Outcome: Met     Problem: Infection  Goal: Absence of Infection Signs and Symptoms  Outcome: Met     Problem: Impaired Wound Healing  Goal: Optimal Wound Healing  Outcome: Met

## 2023-10-12 NOTE — PT/OT/SLP DISCHARGE
Physical Therapy Discharge Summary    Name: Zackery Purdy  MRN: [id number]   Principal Problem: Severe sepsis     Patient Discharged from acute Physical Therapy on 10/12/23.  Please refer to prior PT noted date on 10/11/23 for functional status.     Assessment:     Patient appropriate for care in another setting.    Objective:     GOALS:   Multidisciplinary Problems       Physical Therapy Goals          Problem: Physical Therapy    Goal Priority Disciplines Outcome Goal Variances Interventions   Physical Therapy Goal     PT, PT/OT Adequate for Care Transition     Description: Goals to be met by: discharge     Patient will increase functional independence with mobility by performin. Supine to sit with MInimal Assistance  2. Sit to stand transfer with Minimal Assistance  3. Bed to chair transfer with Minimal Assistance using No Assistive Device                         Reasons for Discontinuation of Therapy Services  Transfer to alternate level of care.      Plan:     Patient Discharged to: Skilled Nursing Facility.      10/12/2023

## 2023-10-12 NOTE — PT/OT/SLP PROGRESS
Physical Therapy      Patient Name:  Zackery Purdy   MRN:  [id number]    Patient not seen today secondary to Patient fatigue (Patient very lethargic today. Refused therapy). Will follow-up 10/13/23.

## 2023-10-12 NOTE — NURSING
Patient left on stretcher via [facility] due to inability to safely transfer from bed as a result of severe weakness and a wound to the R heel.

## 2023-10-12 NOTE — PHYSICIAN QUERY
"PT Name: Zackery Purdy  MR #: [id number]     DOCUMENTATION CLARIFICATION     CDS/: Caitlin Major RN CDI          Contact information: [email or web address]   This form is a permanent document in the medical record.     Query Date: October 12, 2023    By submitting this query, we are merely seeking further clarification of documentation.  Please utilize your independent clinical judgment when addressing the question(s) below.  Provider, please provide the integumentary diagnosis related to the documentation of  "Second right anterior toe":  The Medical Record contains the following:      Wound care 10/11 D Des RAJAN  Right anterior Toe, second Full thickness tissue loss. Base is covered by slough and/or eschar in the wound bed  Open to air, no drainage  1.2 cm x 1 cm x 0 cm  Aplied skin barrier, left open to air             The clinical guidelines noted are only a system guideline. It does not replace the providers clinical judgment.    Per the National Pressure Injury Advisory Panel:   A pressure injury is localized damage to the skin and underlying soft tissue usually over a bony prominence or related to a medical or other device. The injury can present as intact skin or an open ulcer and may be painful. The injury occurs as a result of intense and/or prolonged pressure or pressure in combination with shear. The tolerance of soft tissue for pressure and shear may also be affected by microclimate, nutrition, perfusion, co-morbidities and condition of the soft tissue.       Stage 1 Pressure Injury:  Intact skin with a localized area of non-blanchable erythema, which may appear differently in darkly pigmented skin. Color changes do not include purple or maroon discoloration; these may indicate deep tissue pressure injury.    Stage 2 Pressure Injury:  Partial-thickness loss of skin with exposed dermis. The wound bed is viable, pink or red, moist, and may also present as an intact or ruptured serum-filled " "blister.    Stage 3 Pressure Injury:  Full-thickness loss of skin, in which adipose (fat) is visible in the ulcer and granulation tissue and epibole (rolled wound edges) are often present. Slough and/or eschar may be visible. Undermining and tunneling may occur.    Stage 4 Pressure Injury:  Full-thickness skin and tissue loss with exposed or directly palpable fascia, muscle, tendon, ligament, cartilage or bone in the ulcer. Slough and/or eschar may be visible. Epibole (rolled edges), undermining and/or tunneling often occur.    Unstageable Pressure Injury:  Full-thickness skin and tissue loss in which the extent of tissue damage within the ulcer cannot be confirmed because it is obscured by slough or eschar. If slough or eschar is removed, a Stage 3 or Stage 4 pressure injury will be revealed.    Deep Tissue Pressure Injury:  Intact or non-intact skin with localized area of persistent non-blanchable deep red, maroon, purple discoloration or epidermal separation revealing a dark wound bed or blood filled blister. This injury results from intense and/or prolonged pressure and shear forces at the bone-muscle interface. The wound may evolve rapidly to reveal the actual extent of tissue injury, or may resolve without tissue loss. If necrotic tissue, subcutaneous tissue, granulation tissue, fascia, muscle or other underlying structures are visible, this indicates a full thickness pressure injury (Unstageable, Stage 3 or Stage 4). Do not use DTPI to describe vascular, traumatic, neuropathic, or dermatologic conditions.       Provider, please provide the integumentary diagnosis related to the documentation of  "Second right anterior toe":     [ 4  ] Pressure Injury/Decubitus Ulcer, Stage 4     [   ] Pressure Injury/Decubitus Ulcer, other stage, specify ______________     [   ] Other Integumentary Diagnosis (please specify):______________       Please document in your progress notes daily for the duration of treatment until " resolved and include in your discharge summary.    Reference:    ANU Rodriguez., ELEAZAR Blount., Goldberg, M., ANU Hernandez, ANU Alvarado, & CAROLIN Echols. (2016). Revised National Pressure Ulcer Advisory Panel Pressure Injury Staging System: Revised Pressure Injury Staging System. J Wound Ostomy Continence Nurs, 43(6), 585-597. doi:10.1097/won.4575923158458520    Form No.[id number]

## 2023-10-12 NOTE — PHYSICIAN QUERY
"PT Name: Zackery Purdy  MR #: [id number]     DOCUMENTATION CLARIFICATION     CDS/: Caitlin Major RN CDI          Contact information: [email or web address]   This form is a permanent document in the medical record.     Query Date: October 12, 2023    By submitting this query, we are merely seeking further clarification of documentation.  Please utilize your independent clinical judgment when addressing the question(s) below.  Provider, please provide the integumentary diagnosis related to the documentation of   "Coccyx":   The Medical Record contains the following:      Wound care 10/11 JACQUELINE Nunes RN  Coccyx - partial thickness tissue loss - shallow open ulcer with a red or pink wound bed  Present on admit  0.5 cm x 0.3 cm x 0.1 cm  Cleansed with sterile normal saline, foam dressing applied             The clinical guidelines noted are only a system guideline. It does not replace the providers clinical judgment.    Per the National Pressure Injury Advisory Panel:   A pressure injury is localized damage to the skin and underlying soft tissue usually over a bony prominence or related to a medical or other device. The injury can present as intact skin or an open ulcer and may be painful. The injury occurs as a result of intense and/or prolonged pressure or pressure in combination with shear. The tolerance of soft tissue for pressure and shear may also be affected by microclimate, nutrition, perfusion, co-morbidities and condition of the soft tissue.       Stage 1 Pressure Injury:  Intact skin with a localized area of non-blanchable erythema, which may appear differently in darkly pigmented skin. Color changes do not include purple or maroon discoloration; these may indicate deep tissue pressure injury.    Stage 2 Pressure Injury:  Partial-thickness loss of skin with exposed dermis. The wound bed is viable, pink or red, moist, and may also present as an intact or ruptured serum-filled blister.    Stage 3 " "Pressure Injury:  Full-thickness loss of skin, in which adipose (fat) is visible in the ulcer and granulation tissue and epibole (rolled wound edges) are often present. Slough and/or eschar may be visible. Undermining and tunneling may occur.    Stage 4 Pressure Injury:  Full-thickness skin and tissue loss with exposed or directly palpable fascia, muscle, tendon, ligament, cartilage or bone in the ulcer. Slough and/or eschar may be visible. Epibole (rolled edges), undermining and/or tunneling often occur.    Unstageable Pressure Injury:  Full-thickness skin and tissue loss in which the extent of tissue damage within the ulcer cannot be confirmed because it is obscured by slough or eschar. If slough or eschar is removed, a Stage 3 or Stage 4 pressure injury will be revealed.    Deep Tissue Pressure Injury:  Intact or non-intact skin with localized area of persistent non-blanchable deep red, maroon, purple discoloration or epidermal separation revealing a dark wound bed or blood filled blister. This injury results from intense and/or prolonged pressure and shear forces at the bone-muscle interface. The wound may evolve rapidly to reveal the actual extent of tissue injury, or may resolve without tissue loss. If necrotic tissue, subcutaneous tissue, granulation tissue, fascia, muscle or other underlying structures are visible, this indicates a full thickness pressure injury (Unstageable, Stage 3 or Stage 4). Do not use DTPI to describe vascular, traumatic, neuropathic, or dermatologic conditions.       Provider, please provide the integumentary diagnosis related to the documentation of  "Coccyx":     [   x] Pressure Injury/Decubitus Ulcer, Stage 2 present on admit     [   ] Pressure Injury/Decubitus Ulcer, other stage, specify ___________     [   ] Other Integumentary Diagnosis (please specify):______________         Please document in your progress notes daily for the duration of treatment until resolved and include in " your discharge summary.    Reference:    ANU Rodriguez., ELEAZAR Blount., Goldberg, M., ANU Hernandez, ANU Alvarado, & KATHE Echols (2016). Revised National Pressure Ulcer Advisory Panel Pressure Injury Staging System: Revised Pressure Injury Staging System. J Wound Ostomy Continence Nurs, 43(6), 585-597. doi:10.1097/won.8088500184598716    Form No.[id number]

## 2023-10-12 NOTE — PHYSICIAN QUERY
PT Name: Zackery Purdy  MR #: [id number]     DOCUMENTATION CLARIFICATION     CDS/: Caitlin Major RN CDI          Contact information: [email or web address]   This form is a permanent document in the medical record.    Query Date: October 12, 2023    By submitting this query, we are merely seeking further clarification of documentation to reflect the severity of illness of your patient. Please utilize your independent clinical judgment when addressing the question(s) below.    The Medical Record reflects the following:     Indicators   Supporting Clinical Findings Location in Medical Record   X Lab Value(s) Potassium 10/6   4.4                    10/9   3.3                    10/12 3.2    Sodium  10/6   143                10/9   150                 10/11 150                10/12 149 Labs                   X Treatment/Medication Potassium chloride 20 meq po 2 times daily 10/6  Potassium chloride 40 meq po 10/9 for one dose      NS bolus 1000 ml 10/6 er    NS bolus 809 ml 10/6 er Med sheets    Other       Provider, please specify the diagnosis or diagnoses that correspond(s) to the above indicators. Augustus all that apply:    [  x ] Hypokalemia    [ x  ] Hypernatremia   [   ] Other electrolyte disturbance (please specify): __________   [   ]  Clinically Undetermined       Please document in your progress notes daily for the duration of treatment until resolved, and include in your discharge summary.

## 2023-11-02 PROBLEM — K57.92 DIVERTICULITIS: Status: ACTIVE | Noted: 2023-01-01

## 2023-11-02 PROBLEM — R74.8 ELEVATED LIPASE: Status: ACTIVE | Noted: 2023-01-01

## 2023-11-02 PROBLEM — I25.10 CAD (CORONARY ARTERY DISEASE): Status: ACTIVE | Noted: 2023-01-01

## 2023-11-02 PROBLEM — R74.01 TRANSAMINITIS: Status: ACTIVE | Noted: 2023-01-01

## 2023-11-02 PROBLEM — K59.00 CONSTIPATION: Status: ACTIVE | Noted: 2023-01-01

## 2023-11-02 PROBLEM — E87.5 HYPERKALEMIA: Status: ACTIVE | Noted: 2023-01-01

## 2023-11-02 NOTE — HPI
Pt is a 89 yr old female from NH with PMH of HTN, blindness, CAD s/p NSTEMI, GERD, Gout, CHF, HLD, SIADH, decubitus ulcer of the heel,  was seen yesterday in the ED for a fall and hematoma to the head. CT head showed scalp cephalohematoma on right side. CT c spine showed no acute fracture or significant spondylolisthesis, no paravertebral hematomas, no focal tissue swelling. Pt returned today with c/o Abd pain, lower abd for 5 days associated with nausea and constipation. Pt denies cp, sob, fevers, chills, freq, urgency, or dysuria. Pt was lethargic and hypotensive  on arrival to ED. Pt received 2 L IVF, IV zosyn, and had ct abd and pelvis. Pt's BP better 104/49, MAP 79, HR 80, 99% O2 sat. CT abd pelvis showed: 1. Severe amount of stool is seen within the rectum, correlate for fecal impaction.  2. Colonic diverticulosis with questionable fat stranding along the descending colon which could be seen with acute diverticulitis.  Clinical correlation is required.  3. Abnormal elevation of left hemidiaphragm, correlate for diaphragmatic paralysis.  4. Refer to the body of report for other incidental findings.

## 2023-11-02 NOTE — ASSESSMENT & PLAN NOTE
"This patient does have evidence of infective focus  My overall impression is septic shock due to SBP < 90.  Source: Abdominal  Antibiotics given-   Antibiotics (72h ago, onward)    None        Latest lactate reviewed-  No results for input(s): "LACTATE" in the last 72 hours.  Organ dysfunction indicated by Acute kidney injury    Fluid challenge Actual Body weight- Patient will receive 30ml/kg actual body weight to calculate fluid bolus for treatment of septic shock.     Post- resuscitation assessment Yes Perfusion exam was performed within 6 hours of septic shock presentation after bolus shows Adequate tissue perfusion assessed by non-invasive monitoring       Will Not start Pressors- Levophed for MAP of 65  Source control achieved by: IVF's, MAP up to 79  Continue with IVF's, admitted to ICU  "

## 2023-11-02 NOTE — SUBJECTIVE & OBJECTIVE
Past Medical History:   Diagnosis Date    Anemia, unspecified     Anorexia     Blindness     Constipation     Coronary artery disease     Decubitus ulcer of heel 10/6/2023    GERD (gastroesophageal reflux disease)     Gout, unspecified     Heart failure, unspecified     hypertensive heart disease related    Hypertension     Insomnia     Intervertebral disc disorder with myelopathy, lumbar region     Mixed hyperlipidemia     Mixed hyperlipidemia     NSTEMI (non-ST elevated myocardial infarction)     Pruritus     Small bowel obstruction     Syndrome of inappropriate ADH (SIADH) secretion     Unspecified glaucoma     bilat.    Unspecified glaucoma        Past Surgical History:   Procedure Laterality Date    APPENDECTOMY      CHOLECYSTECTOMY      CORONARY ARTERY BYPASS GRAFT      HYSTERECTOMY      WOUND DEBRIDEMENT Right 10/8/2023    Procedure: DEBRIDEMENT, WOUND;  Surgeon: Jerod Toth MD;  Location: [facility] OR;  Service: General;  Laterality: Right;  HEEL       Review of patient's allergies indicates:   Allergen Reactions    Amlodipine     Coconut Nausea And Vomiting    Tree nuts Nausea And Vomiting     Patient reports vomiting with almonds and cashews.        No current facility-administered medications on file prior to encounter.     Current Outpatient Medications on File Prior to Encounter   Medication Sig    acetaminophen (TYLENOL EXTRA STRENGTH) 500 MG tablet Take 500 mg by mouth every 4 (four) hours as needed for Pain or Temperature greater than.    ascorbic acid, vitamin C, (VITAMIN C) 500 MG tablet Take 500 mg by mouth once daily.    aspirin (ECOTRIN) 81 MG EC tablet Take 1 tablet (81 mg total) by mouth once daily.    bisacodyL (DULCOLAX) 10 mg Supp Place 10 mg rectally daily as needed.    demeclocycline (DECLOMYCIN) 300 MG Tab Take 300 mg by mouth 3 (three) times daily.    dorzolamide-timolol 2-0.5% (COSOPT) 22.3-6.8 mg/mL ophthalmic solution Place 1 drop into both eyes 2 (two) times daily.     esomeprazole (NEXIUM) 40 MG capsule Take 20 mg by mouth before breakfast.    hydrOXYzine pamoate (VISTARIL) 25 MG Cap Take 25 mg by mouth every 6 (six) hours as needed (ITCHING RELATED TO PRURITUS).    latanoprost 0.005 % ophthalmic solution Place 1 drop into both eyes every evening.    linaCLOtide (LINZESS) 145 mcg Cap capsule Take 145 mcg by mouth once daily.    magnesium oxide (MAG-OX) 400 mg (241.3 mg magnesium) tablet Take 400 mg by mouth once daily.    megestroL (MEGACE) 400 mg/10 mL (40 mg/mL) Susp Take 10 mLs by mouth 2 (two) times a day.    melatonin 10 mg Tab Take 5 mg by mouth every evening.    minoxidiL (LONITEN) 2.5 MG tablet Take 1 tablet (2.5 mg total) by mouth once daily.    multivitamin (ONE DAILY MULTIVITAMIN) per tablet Take 1 tablet by mouth once daily.    ondansetron (ZOFRAN) 8 MG tablet Take 8 mg by mouth every 6 (six) hours as needed for Nausea.    polyethylene glycol (GLYCOLAX) 17 gram PwPk Take 17 g by mouth once daily.    potassium chloride (MICRO-K) 10 MEQ CpSR Take 20 mEq by mouth once.    sacubitriL-valsartan (ENTRESTO)  mg per tablet Take 1 tablet by mouth 2 (two) times daily.    simvastatin (ZOCOR) 40 MG tablet Take 40 mg by mouth every evening.    temazepam (RESTORIL) 7.5 MG Cap Take 7.5 mg by mouth every evening.    torsemide (DEMADEX) 20 MG Tab Take 20 mg by mouth once daily.    vitamin E 400 UNIT capsule Take 400 Units by mouth once daily.    losartan (COZAAR) 25 MG tablet Take 25 mg by mouth once daily.     Family History    None       Tobacco Use    Smoking status: Never    Smokeless tobacco: Never   Substance and Sexual Activity    Alcohol use: Never    Drug use: Never    Sexual activity: Not Currently     Review of Systems   Constitutional:  Positive for fatigue.   HENT: Negative.     Eyes: Negative.    Respiratory: Negative.     Cardiovascular: Negative.    Gastrointestinal:  Positive for abdominal distention, abdominal pain and constipation.   Endocrine: Negative.     Genitourinary: Negative.    Musculoskeletal: Negative.    Skin: Negative.    Neurological: Negative.      Objective:     Vital Signs (Most Recent):  Pulse: 81 (11/02/23 1830)  Resp: 18 (11/02/23 1840)  BP: 110/62 (11/02/23 1830)  SpO2: 99 % (11/02/23 1830) Vital Signs (24h Range):  Pulse:  [58-97] 81  Resp:  [11-24] 18  SpO2:  [69 %-99 %] 99 %  BP: ()/() 110/62     Weight: 54.4 kg (120 lb)  Body mass index is 19.37 kg/m².     Physical Exam  Constitutional:       Comments: Gen weakness   HENT:      Head: Normocephalic.      Nose: Nose normal.   Eyes:      Conjunctiva/sclera: Conjunctivae normal.   Cardiovascular:      Rate and Rhythm: Normal rate and regular rhythm.      Pulses: Normal pulses.      Heart sounds: Normal heart sounds.   Pulmonary:      Effort: Pulmonary effort is normal.      Breath sounds: Normal breath sounds.   Abdominal:      Palpations: Abdomen is soft.      Tenderness: There is abdominal tenderness.   Musculoskeletal:      Cervical back: Neck supple.   Skin:     General: Skin is warm and dry.   Neurological:      Mental Status: She is alert and oriented to person, place, and time.                Significant Labs: All pertinent labs within the past 24 hours have been reviewed.    Significant Imaging: I have reviewed all pertinent imaging results/findings within the past 24 hours.

## 2023-11-02 NOTE — ASSESSMENT & PLAN NOTE
The likely etiology of the hyperkalemia is BETHANY.  The patients latest potassium has been reviewed and the results are listed below  Recent Labs   Lab 11/02/23  1533   K 5.9*     Recheck stat labs after hydration

## 2023-11-02 NOTE — ED PROVIDER NOTES
Encounter Date: 11/2/2023       History     Chief Complaint   Patient presents with    Abdominal Pain     C/O FALL YESTERDAY AND SEEN IN ER, HEMATOMA TO HEAD, N/V, [facility] NURSE REPORTS PT IS IN EXTREME ABD PAIN, NOT EATING OR DRINKING, UPON ARRIVAL PT IS DIFFICULT TO AROUSE AND GIVEN 2 MG NARCAN VIA NASAL     HPI  Review of patient's allergies indicates:   Allergen Reactions    Amlodipine     Coconut Nausea And Vomiting    Tree nuts Nausea And Vomiting     Patient reports vomiting with almonds and cashews.      Past Medical History:   Diagnosis Date    Anemia, unspecified     Anorexia     Blindness     Constipation     Coronary artery disease     Decubitus ulcer of heel 10/6/2023    GERD (gastroesophageal reflux disease)     Gout, unspecified     Heart failure, unspecified     hypertensive heart disease related    Hypertension     Insomnia     Intervertebral disc disorder with myelopathy, lumbar region     Mixed hyperlipidemia     Mixed hyperlipidemia     NSTEMI (non-ST elevated myocardial infarction)     Pruritus     Small bowel obstruction     Syndrome of inappropriate ADH (SIADH) secretion     Unspecified glaucoma     bilat.    Unspecified glaucoma      Past Surgical History:   Procedure Laterality Date    APPENDECTOMY      CHOLECYSTECTOMY      CORONARY ARTERY BYPASS GRAFT      HYSTERECTOMY      WOUND DEBRIDEMENT Right 10/8/2023    Procedure: DEBRIDEMENT, WOUND;  Surgeon: Jerod Toth MD;  Location: [facility];  Service: General;  Laterality: Right;  HEEL     No family history on file.  Social History     Tobacco Use    Smoking status: Never    Smokeless tobacco: Never   Substance Use Topics    Alcohol use: Never    Drug use: Never     Review of Systems    Physical Exam     Initial Vitals   BP Pulse Resp Temp SpO2   11/02/23 1529 11/02/23 1529 11/02/23 1529 11/02/23 2053 11/02/23 1529   (!) 123/97 82 14 (!) 91.7 °F (33.2 °C) 98 %      MAP       --                Physical Exam    Nursing note and vitals  reviewed.  Constitutional: Vital signs are normal. She appears well-developed and well-nourished. She is cooperative.   HENT:   Head: Normocephalic.   Mouth/Throat: Oropharynx is clear and moist.   Hematoma on head   Eyes: Conjunctivae, EOM and lids are normal. Pupils are equal, round, and reactive to light.   Neck: Trachea normal. Neck supple.   No cervical tenderness   Normal range of motion.  Cardiovascular:  Regular rhythm, normal heart sounds and intact distal pulses.           Pulmonary/Chest: Breath sounds normal.   Abdominal: Abdomen is soft. Bowel sounds are normal.   Musculoskeletal:         General: Normal range of motion.      Cervical back: Normal, normal range of motion and neck supple.      Lumbar back: Normal.     Neurological: She is alert and oriented to person, place, and time. She has normal strength. Coordination normal.   Skin: Skin is warm, dry and intact. Capillary refill takes less than 2 seconds.   Psychiatric: She has a normal mood and affect. Her speech is normal and behavior is normal. Judgment and thought content normal. Cognition and memory are normal.         ED Course   Critical Care    Date/Time: 11/2/2023 2:55 PM    Performed by: Robby Herr MD  Authorized by: Saad Lilly MD  Direct patient critical care time: 15 minutes  Additional history critical care time: 10 minutes  Ordering / reviewing critical care time: 10 minutes  Documentation critical care time: 10 minutes  Consulting other physicians critical care time: 10 minutes  Consult with family critical care time: 15 minutes  Total critical care time (exclusive of procedural time) : 70 minutes  Critical care time was exclusive of separately billable procedures and treating other patients and teaching time.  Critical care was necessary to treat or prevent imminent or life-threatening deterioration of the following conditions: sepsis.  Critical care was time spent personally by me on the following activities:  evaluation of patient's response to treatment, examination of patient, obtaining history from patient or surrogate, ordering and performing treatments and interventions, ordering and review of laboratory studies, ordering and review of radiographic studies, pulse oximetry, re-evaluation of patient's condition and review of old charts.  Subsequent provider of critical care: I assumed direction of critical care for this patient from another provider of my specialty.        Labs Reviewed   COMPREHENSIVE METABOLIC PANEL - Abnormal; Notable for the following components:       Result Value    Potassium Level 5.9 (*)     Carbon Dioxide 14 (*)     Blood Urea Nitrogen 98.0 (*)     Creatinine 2.23 (*)     Protein Total 5.3 (*)     Albumin Level 2.5 (*)     Alkaline Phosphatase 267 (*)     Alanine Aminotransferase 66 (*)     Aspartate Aminotransferase 107 (*)     Anion Gap 17.0 (*)     BUN/Creatinine Ratio 44 (*)     All other components within normal limits   LIPASE - Abnormal; Notable for the following components:    Lipase Level 659 (*)     All other components within normal limits   URINALYSIS - Abnormal; Notable for the following components:    Blood, UA Trace-Intact (*)     Leukocyte Esterase, UA Moderate (*)     All other components within normal limits    Narrative:      URINE STABILITY IS 2 HOURS AT ROOM TEMP OR    SIX HOURS REFRIGERATED. PERFORMING TESTING ON    SPECIMENS GREATER THAN THIS AGE MAY AFFECT THE    FOLLOWING TESTS:    PH          SPECIFIC GRAVITY           BLOOD    CLARITY     BILIRUBIN               UROBILINOGEN   CBC WITH DIFFERENTIAL - Abnormal; Notable for the following components:    WBC 16.01 (*)     RBC 3.34 (*)     Hgb 10.4 (*)     Hct 30.4 (*)     RDW 17.2 (*)     Neut % 83.8 (*)     Lymph % 9.1 (*)     Mono % 2.6 (*)     Eos % 0.1 (*)     Neut # 13.42 (*)     Mono # 0.41 (*)     Eos # 0.01 (*)     IG# 0.68 (*)     IG% 4.2 (*)     All other components within normal limits   URINALYSIS,  MICROSCOPIC - Abnormal; Notable for the following components:    Yeast, UA Moderate (*)     WBC, UA 11-20 (*)     All other components within normal limits   LACTIC ACID, PLASMA - Abnormal; Notable for the following components:    Lactic Acid Level 3.3 (*)     All other components within normal limits   LACTIC ACID, PLASMA - Abnormal; Notable for the following components:    Lactic Acid Level 2.3 (*)     All other components within normal limits   BASIC METABOLIC PANEL - Abnormal; Notable for the following components:    Carbon Dioxide 18 (*)     Glucose Level 121 (*)     Blood Urea Nitrogen 89.0 (*)     Creatinine 2.24 (*)     BUN/Creatinine Ratio 40 (*)     Calcium Level Total 8.1 (*)     All other components within normal limits   TROPONIN I - Normal   CK - Normal   CBC W/ AUTO DIFFERENTIAL    Narrative:     The following orders were created for panel order CBC auto differential.  Procedure                               Abnormality         Status                     ---------                               -----------         ------                     CBC with Differential[[id number]]       Abnormal            Final result                 Please view results for these tests on the individual orders.        ECG Results              EKG 12-lead (Final result)  Result time 11/03/23 11:49:10      Final result by Interface, Lab In [facility] (11/03/23 11:49:10)                   Narrative:    Test Reason : R07.9,    Vent. Rate : 073 BPM     Atrial Rate : 073 BPM     P-R Int : 144 ms          QRS Dur : 078 ms      QT Int : 390 ms       P-R-T Axes : -64 058 -05 degrees     QTc Int : 429 ms    Unusual P axis, possible ectopic atrial rhythm  Nonspecific T wave abnormality  Abnormal ECG  When compared with ECG of 14-JUL-2022 11:14,  Ectopic atrial rhythm has replaced Sinus rhythm  Nonspecific T wave abnormality now evident in Inferior leads  Confirmed by Jenna ALVARADO, Lew ([id number]) on 11/3/2023 11:49:03 AM    Referred By:  [email or web address]   SELF           Confirmed By:Lew West MD                                  Imaging Results              X-Ray Chest AP Portable (Final result)  Result time 11/02/23 18:28:15      Final result by Mik Jose MD (11/02/23 18:28:15)                   Impression:      1. No radiographic evidence of an acute cardiopulmonary process.  2. Gas-filled loops of bowel projecting over the left upper quadrant, correlate with patient's symptoms.      Electronically signed by: Mik Jose  Date:    11/02/2023  Time:    18:28               Narrative:    EXAMINATION:  XR CHEST AP PORTABLE    CLINICAL HISTORY:  Cough, unspecified    COMPARISON:  07/12/2022    FINDINGS:  Single AP chest radiograph is provided for evaluation.    Cardiac silhouette is normal in size.    There is no focal consolidation, pneumothorax, or pleural effusion.    Postsurgical changes of sternotomy.  No acute osseous findings.    Redemonstrated abnormal elevation of left hemidiaphragm with gas-filled loops of bowel, correlate with patient's symptoms.                                       CT Head Without Contrast (Final result)  Result time 11/02/23 17:23:28      Final result by Mik Jose MD (11/02/23 17:23:28)                   Impression:      No CT evidence of an acute intracranial process.  Right scalp hematoma as seen yesterday.      Electronically signed by: Mik Jose  Date:    11/02/2023  Time:    17:23               Narrative:    EXAMINATION:  CT HEAD WITHOUT CONTRAST    CLINICAL HISTORY:  Mental status change, unknown cause;    TECHNIQUE:  Low dose axial images were obtained through the head.  Coronal and sagittal reformations were also performed. Contrast was not administered.  Dose reduction techniques including automatic exposure control (AEC) were utilized.    COMPARISON:  CT head 11/01/2023.    FINDINGS:  INTRACRANIAL: Brain parenchyma demonstrates normal attenuation and configuration.  No acute intracranial  hemorrhage.  No hydrocephalus.  No intracranial mass effect.    SINUSES: Visualized paranasal sinuses and mastoids are clear.    SKULL/SCALP: Soft tissue hematoma overlying the right parietal bone with no associated osseous finding.  Visualized osseous structures are normal.    ORBITS: Visualized orbits are normal.                                       CT Abdomen Pelvis  Without Contrast (Final result)  Result time 11/02/23 17:31:11      Final result by Mik Jose MD (11/02/23 17:31:11)                   Impression:      1. Severe amount of stool is seen within the rectum, correlate for fecal impaction.  2. Colonic diverticulosis with questionable fat stranding along the descending colon which could be seen with acute diverticulitis.  Clinical correlation is required.  3. Abnormal elevation of left hemidiaphragm, correlate for diaphragmatic paralysis.  4. Refer to the body of report for other incidental findings.      Electronically signed by: Mik Jose  Date:    11/02/2023  Time:    17:31               Narrative:    EXAMINATION:  CT ABDOMEN AND PELVIS    CLINICAL HISTORY:  LLQ abdominal pain;    TECHNIQUE:  Axial CT images were obtained through the abdomen and pelvis without IV contrast.  Coronal and sagittal reconstructions submitted and interpreted. Automated exposure control utilized limiting radiation dose to the patient. .    COMPARISON:  None.    FINDINGS:  Visualized Thorax: The lung bases are clear.  Coronary artery calcifications are noted.  The heart is normal in size.  Calcifications of the aortic valve and mitral valve are seen.    Liver, Gallbladder, and Biliary System: The liver and biliary system are normal.  Postsurgical changes of cholecystectomy.    Spleen: Normal    Pancreas: Joan    Kidneys, Ureter, Bladder: Multiple nonobstructing stones are seen in bilateral kidneys.  No evidence of hydronephrosis or obstructive uropathy.  A Tolentino catheter is seen within the bladder with an  air-fluid level, nonspecific.  Bladder wall thickening, correlate with urinalysis.    Adrenal Glands: Not well visualized.    Peritoneum: No free fluid, free air, or inflammatory change.    GI Tract: Abnormal elevation left hemidiaphragm.  The appendix is normal.  Large amount of stool is seen within the rectum..  No evidence of bowel obstruction.    Reproductive Organs: The uterus is not definitively visualized.    Lymph Nodes: No lymphadenopathy.    Vascular: The abdominal aorta is normal in caliber with severe atherosclerotic disease..    Bones: Multilevel discogenic disease of the lumbar spine.  No acute osseous abnormality.    Soft Tissues: Normal.                                       Medications   piperacillin-tazobactam (ZOSYN) 4.5 g in dextrose 5 % in water (D5W) 100 mL IVPB (MB+) (0 g Intravenous Stopped 11/2/23 1752)   sodium chloride 0.9% bolus 1,000 mL 1,000 mL (0 mLs Intravenous Stopped 11/2/23 1822)   sodium chloride 0.9% bolus 1,000 mL 1,000 mL (0 mLs Intravenous Stopped 11/2/23 1822)   0.9%  NaCl infusion (1,000 mLs Intravenous New Bag 11/2/23 1841)     Medical Decision Making  Patient had 2 large bowel movements in the ED. She does not have a fecal impaction.    Amount and/or Complexity of Data Reviewed  Labs: ordered. Decision-making details documented in ED Course.  Radiology: ordered. Decision-making details documented in ED Course.    Risk  Prescription drug management.  Decision regarding hospitalization.               ED Course as of 11/22/23 1047   Thu Nov 02, 2023   1710 Bilirubin, UA: Negative [TM]   1726 CT Head Without Contrast  No evidence of acute intracranial abnormality [TM]   1726 Lipase(!)  This likely represents pancreatitis. [TM]   1726 Comprehensive metabolic panel(!)  This represents considerable dehydration. [TM]   1727 Urinalysis, Microscopic(!)  Consistent with a UTI. [TM]   1735 CT Abdomen Pelvis  Without Contrast  Consistent with diverticulitis and possible fecal  impaction. [TM]      ED Course User Index  [TM] Robby Herr MD                    Clinical Impression:   Final diagnoses:  [R05.9] Cough  [R07.9] Chest pain  [R00.1] Bradycardia  [A41.9, R65.21] Septic shock (Primary)  [K57.92] Diverticulitis  [E86.0] Dehydration  [N17.9] Acute kidney injury        ED Disposition Condition    Admit Fair                Robby Herr MD  11/02/23 9612       Robby Herr MD  11/22/23 4325

## 2023-11-02 NOTE — ED PROVIDER NOTES
Encounter Date: 11/2/2023       History     Chief Complaint   Patient presents with    Abdominal Pain     C/O FALL YESTERDAY AND SEEN IN ER, HEMATOMA TO HEAD, N/V, [facility] NURSE REPORTS PT IS IN EXTREME ABD PAIN, NOT EATING OR DRINKING, UPON ARRIVAL PT IS DIFFICULT TO AROUSE AND GIVEN 2 MG NARCAN VIA NASAL     Patient is a from nursing home.  She was seen yesterday secondary to a fall.  She was seen by vascular today in [place] secondary to a heel ulcer.  She presents complaining of abdominal pain that it onset yesterday morning.  She indicates it in the left lower quadrant.  She indicates nausea and vomiting.  She denies chest pain    The history is provided by the patient, the EMS personnel and the group home.     Review of patient's allergies indicates:   Allergen Reactions    Amlodipine     Coconut Nausea And Vomiting    Tree nuts Nausea And Vomiting     Patient reports vomiting with almonds and cashews.      Past Medical History:   Diagnosis Date    Anemia, unspecified     Anorexia     Blindness     Constipation     Coronary artery disease     Decubitus ulcer of heel 10/6/2023    GERD (gastroesophageal reflux disease)     Gout, unspecified     Heart failure, unspecified     hypertensive heart disease related    Hypertension     Insomnia     Intervertebral disc disorder with myelopathy, lumbar region     Mixed hyperlipidemia     Mixed hyperlipidemia     NSTEMI (non-ST elevated myocardial infarction)     Pruritus     Small bowel obstruction     Syndrome of inappropriate ADH (SIADH) secretion     Unspecified glaucoma     bilat.    Unspecified glaucoma      Past Surgical History:   Procedure Laterality Date    APPENDECTOMY      CHOLECYSTECTOMY      CORONARY ARTERY BYPASS GRAFT      HYSTERECTOMY      WOUND DEBRIDEMENT Right 10/8/2023    Procedure: DEBRIDEMENT, WOUND;  Surgeon: Jerod Toth MD;  Location: [facility];  Service: General;  Laterality: Right;  HEEL     No family history on file.  Social History      Tobacco Use    Smoking status: Never    Smokeless tobacco: Never   Substance Use Topics    Alcohol use: Never    Drug use: Never     Review of Systems   Constitutional:  Positive for appetite change and fatigue.   Respiratory: Negative.     Cardiovascular: Negative.    Gastrointestinal:  Positive for abdominal pain, nausea and vomiting.       Physical Exam     Initial Vitals   BP Pulse Resp Temp SpO2   11/02/23 1529 11/02/23 1529 11/02/23 1529 11/02/23 2053 11/02/23 1529   (!) 123/97 82 14 (!) 91.7 °F (33.2 °C) 98 %      MAP       --                Physical Exam    Constitutional:   fraiale, poorly nourished, listless and somnolent   Neck: Neck supple.   Normal range of motion.  Cardiovascular:  Normal rate, regular rhythm and normal heart sounds.           Pulmonary/Chest:   Diminished respiratory effort, diminished breath sounds, patient is noted to be hypoxic on the monitor.   Abdominal:   Abdomen soft, bowel sounds are diminished, she is tenderness in the left lower quadrant   Musculoskeletal:      Cervical back: Normal range of motion and neck supple.     Skin:   Poor turgor, stage II sacral decubitus, left foot ulcer noted         ED Course   Critical Care    Date/Time: 11/2/2023 2:55 PM    Performed by: Ananth Avila MD  Authorized by: Ananth Avila MD  Direct patient critical care time: 18 minutes  Additional history critical care time: 10 minutes  Ordering / reviewing critical care time: 13 minutes  Documentation critical care time: 11 minutes  Consulting other physicians critical care time: 7 minutes  Consult with family critical care time: 8 minutes  Total critical care time (exclusive of procedural time) : 67 minutes  Critical care was necessary to treat or prevent imminent or life-threatening deterioration of the following conditions: sepsis and circulatory failure.  Critical care was time spent personally by me on the following activities: development of treatment plan with patient or  surrogate, discussions with primary provider, interpretation of cardiac output measurements, evaluation of patient's response to treatment, examination of patient, obtaining history from patient or surrogate, ordering and performing treatments and interventions, ordering and review of laboratory studies, ordering and review of radiographic studies, pulse oximetry, re-evaluation of patient's condition and review of old charts.        Labs Reviewed   COMPREHENSIVE METABOLIC PANEL - Abnormal; Notable for the following components:       Result Value    Potassium Level 5.9 (*)     Carbon Dioxide 14 (*)     Blood Urea Nitrogen 98.0 (*)     Creatinine 2.23 (*)     Protein Total 5.3 (*)     Albumin Level 2.5 (*)     Alkaline Phosphatase 267 (*)     Alanine Aminotransferase 66 (*)     Aspartate Aminotransferase 107 (*)     Anion Gap 17.0 (*)     BUN/Creatinine Ratio 44 (*)     All other components within normal limits   LIPASE - Abnormal; Notable for the following components:    Lipase Level 659 (*)     All other components within normal limits   URINALYSIS - Abnormal; Notable for the following components:    Blood, UA Trace-Intact (*)     Leukocyte Esterase, UA Moderate (*)     All other components within normal limits    Narrative:      URINE STABILITY IS 2 HOURS AT ROOM TEMP OR    SIX HOURS REFRIGERATED. PERFORMING TESTING ON    SPECIMENS GREATER THAN THIS AGE MAY AFFECT THE    FOLLOWING TESTS:    PH          SPECIFIC GRAVITY           BLOOD    CLARITY     BILIRUBIN               UROBILINOGEN   CBC WITH DIFFERENTIAL - Abnormal; Notable for the following components:    WBC 16.01 (*)     RBC 3.34 (*)     Hgb 10.4 (*)     Hct 30.4 (*)     RDW 17.2 (*)     Neut % 83.8 (*)     Lymph % 9.1 (*)     Mono % 2.6 (*)     Eos % 0.1 (*)     Neut # 13.42 (*)     Mono # 0.41 (*)     Eos # 0.01 (*)     IG# 0.68 (*)     IG% 4.2 (*)     All other components within normal limits   URINALYSIS, MICROSCOPIC - Abnormal; Notable for the  following components:    Yeast, UA Moderate (*)     WBC, UA 11-20 (*)     All other components within normal limits   LACTIC ACID, PLASMA - Abnormal; Notable for the following components:    Lactic Acid Level 3.3 (*)     All other components within normal limits   LACTIC ACID, PLASMA - Abnormal; Notable for the following components:    Lactic Acid Level 2.3 (*)     All other components within normal limits   BASIC METABOLIC PANEL - Abnormal; Notable for the following components:    Carbon Dioxide 18 (*)     Glucose Level 121 (*)     Blood Urea Nitrogen 89.0 (*)     Creatinine 2.24 (*)     BUN/Creatinine Ratio 40 (*)     Calcium Level Total 8.1 (*)     All other components within normal limits   TROPONIN I - Normal   CK - Normal   CBC W/ AUTO DIFFERENTIAL    Narrative:     The following orders were created for panel order CBC auto differential.  Procedure                               Abnormality         Status                     ---------                               -----------         ------                     CBC with Differential[[id number]]       Abnormal            Final result                 Please view results for these tests on the individual orders.     EKG Readings: (Independently Interpreted)   Initial Reading: No STEMI. Rhythm: Normal Sinus Rhythm. Heart Rate: 71. Ectopy: No Ectopy. ST Segments: Normal ST Segments. T Waves: Normal.     ECG Results              EKG 12-lead (Final result)  Result time 11/03/23 11:49:10      Final result by Interface, Lab In [facility] (11/03/23 11:49:10)                   Narrative:    Test Reason : R07.9,    Vent. Rate : 073 BPM     Atrial Rate : 073 BPM     P-R Int : 144 ms          QRS Dur : 078 ms      QT Int : 390 ms       P-R-T Axes : -64 058 -05 degrees     QTc Int : 429 ms    Unusual P axis, possible ectopic atrial rhythm  Nonspecific T wave abnormality  Abnormal ECG  When compared with ECG of 14-JUL-2022 11:14,  Ectopic atrial rhythm has replaced Sinus  rhythm  Nonspecific T wave abnormality now evident in Inferior leads  Confirmed by Jenna ALVARADO, Lew ([id number]) on 11/3/2023 11:49:03 AM    Referred By: [email or web address]   SELF           Confirmed By:Lew West MD                                  Imaging Results              X-Ray Chest AP Portable (Final result)  Result time 11/02/23 18:28:15      Final result by Mik Jose MD (11/02/23 18:28:15)                   Impression:      1. No radiographic evidence of an acute cardiopulmonary process.  2. Gas-filled loops of bowel projecting over the left upper quadrant, correlate with patient's symptoms.      Electronically signed by: Mik Jose  Date:    11/02/2023  Time:    18:28               Narrative:    EXAMINATION:  XR CHEST AP PORTABLE    CLINICAL HISTORY:  Cough, unspecified    COMPARISON:  07/12/2022    FINDINGS:  Single AP chest radiograph is provided for evaluation.    Cardiac silhouette is normal in size.    There is no focal consolidation, pneumothorax, or pleural effusion.    Postsurgical changes of sternotomy.  No acute osseous findings.    Redemonstrated abnormal elevation of left hemidiaphragm with gas-filled loops of bowel, correlate with patient's symptoms.                                       CT Head Without Contrast (Final result)  Result time 11/02/23 17:23:28      Final result by Mik Jose MD (11/02/23 17:23:28)                   Impression:      No CT evidence of an acute intracranial process.  Right scalp hematoma as seen yesterday.      Electronically signed by: Mik Jose  Date:    11/02/2023  Time:    17:23               Narrative:    EXAMINATION:  CT HEAD WITHOUT CONTRAST    CLINICAL HISTORY:  Mental status change, unknown cause;    TECHNIQUE:  Low dose axial images were obtained through the head.  Coronal and sagittal reformations were also performed. Contrast was not administered.  Dose reduction techniques including automatic exposure control (AEC) were  utilized.    COMPARISON:  CT head 11/01/2023.    FINDINGS:  INTRACRANIAL: Brain parenchyma demonstrates normal attenuation and configuration.  No acute intracranial hemorrhage.  No hydrocephalus.  No intracranial mass effect.    SINUSES: Visualized paranasal sinuses and mastoids are clear.    SKULL/SCALP: Soft tissue hematoma overlying the right parietal bone with no associated osseous finding.  Visualized osseous structures are normal.    ORBITS: Visualized orbits are normal.                                       CT Abdomen Pelvis  Without Contrast (Final result)  Result time 11/02/23 17:31:11      Final result by Mik Jose MD (11/02/23 17:31:11)                   Impression:      1. Severe amount of stool is seen within the rectum, correlate for fecal impaction.  2. Colonic diverticulosis with questionable fat stranding along the descending colon which could be seen with acute diverticulitis.  Clinical correlation is required.  3. Abnormal elevation of left hemidiaphragm, correlate for diaphragmatic paralysis.  4. Refer to the body of report for other incidental findings.      Electronically signed by: Mik Jose  Date:    11/02/2023  Time:    17:31               Narrative:    EXAMINATION:  CT ABDOMEN AND PELVIS    CLINICAL HISTORY:  LLQ abdominal pain;    TECHNIQUE:  Axial CT images were obtained through the abdomen and pelvis without IV contrast.  Coronal and sagittal reconstructions submitted and interpreted. Automated exposure control utilized limiting radiation dose to the patient. .    COMPARISON:  None.    FINDINGS:  Visualized Thorax: The lung bases are clear.  Coronary artery calcifications are noted.  The heart is normal in size.  Calcifications of the aortic valve and mitral valve are seen.    Liver, Gallbladder, and Biliary System: The liver and biliary system are normal.  Postsurgical changes of cholecystectomy.    Spleen: Normal    Pancreas: Joan    Kidneys, Ureter, Bladder: Multiple  nonobstructing stones are seen in bilateral kidneys.  No evidence of hydronephrosis or obstructive uropathy.  A Tolentino catheter is seen within the bladder with an air-fluid level, nonspecific.  Bladder wall thickening, correlate with urinalysis.    Adrenal Glands: Not well visualized.    Peritoneum: No free fluid, free air, or inflammatory change.    GI Tract: Abnormal elevation left hemidiaphragm.  The appendix is normal.  Large amount of stool is seen within the rectum..  No evidence of bowel obstruction.    Reproductive Organs: The uterus is not definitively visualized.    Lymph Nodes: No lymphadenopathy.    Vascular: The abdominal aorta is normal in caliber with severe atherosclerotic disease..    Bones: Multilevel discogenic disease of the lumbar spine.  No acute osseous abnormality.    Soft Tissues: Normal.                                       Medications   piperacillin-tazobactam (ZOSYN) 4.5 g in dextrose 5 % in water (D5W) 100 mL IVPB (MB+) (0 g Intravenous Stopped 11/2/23 1752)   sodium chloride 0.9% bolus 1,000 mL 1,000 mL (0 mLs Intravenous Stopped 11/2/23 1822)   sodium chloride 0.9% bolus 1,000 mL 1,000 mL (0 mLs Intravenous Stopped 11/2/23 1822)   0.9%  NaCl infusion (1,000 mLs Intravenous New Bag 11/2/23 1841)     Medical Decision Making  Amount and/or Complexity of Data Reviewed  Labs: ordered. Decision-making details documented in ED Course.  Radiology: ordered. Decision-making details documented in ED Course.  ECG/medicine tests: ordered and independent interpretation performed.    Risk  Prescription drug management.  Decision regarding hospitalization.               ED Course as of 11/13/23 1015   Thu Nov 02, 2023   1710 Bilirubin, UA: Negative [TM]   1726 CT Head Without Contrast  No evidence of acute intracranial abnormality [TM]   1726 Lipase(!)  This likely represents pancreatitis. [TM]   1726 Comprehensive metabolic panel(!)  This represents considerable dehydration. [TM]   1727 Urinalysis,  "Microscopic(!)  Consistent with a UTI. [TM]   8542 CT Abdomen Pelvis  Without Contrast  Consistent with diverticulitis and possible fecal impaction. [TM]      ED Course User Index  [TM] Robby Herr MD                 Medical Decision Making:   Clinical Tests:   Sepsis Perfusion Assessment: "I attest a sepsis perfusion exam was performed within 6 hours of sepsis, severe sepsis, or septic shock presentation, following fluid resuscitation."      Clinical Impression:   Final diagnoses:  [R05.9] Cough  [R07.9] Chest pain  [R00.1] Bradycardia  [A41.9, R65.21] Septic shock (Primary)  [K57.92] Diverticulitis  [E86.0] Dehydration  [N17.9] Acute kidney injury        ED Disposition Condition    Admit Ananth Duran MD  11/13/23 1015    "

## 2023-11-03 NOTE — PLAN OF CARE
Physician ordered to consult hospice.  Pt's family preference : [facility]. Referral made to [facility] to eval for GIP.

## 2023-11-03 NOTE — H&P
[facility]  [facility] Medicine  History & Physical    Patient Name: Zackery Purdy  MRN: [id number]  Patient Class: IP- Inpatient  Admission Date: 11/2/2023  Attending Physician: Marry Bowles DO  Primary Care Provider: Tanner Moncada MD         Patient information was obtained from patient and ER records.     Subjective:     Principal Problem:<principal problem not specified>    Chief Complaint:   Chief Complaint   Patient presents with    Abdominal Pain     C/O FALL YESTERDAY AND SEEN IN ER, HEMATOMA TO HEAD, N/V, [facility] NURSE REPORTS PT IS IN EXTREME ABD PAIN, NOT EATING OR DRINKING, UPON ARRIVAL PT IS DIFFICULT TO AROUSE AND GIVEN 2 MG NARCAN VIA NASAL        HPI: Pt is a 89 yr old female from NH with PMH of HTN, blindness, CAD s/p NSTEMI, GERD, Gout, CHF, HLD, SIADH, decubitus ulcer of the heel,  was seen yesterday in the ED for a fall and hematoma to the head. CT head showed scalp cephalohematoma on right side. CT c spine showed no acute fracture or significant spondylolisthesis, no paravertebral hematomas, no focal tissue swelling. Pt returned today with c/o Abd pain, lower abd for 5 days associated with nausea and constipation. Pt denies cp, sob, fevers, chills, freq, urgency, or dysuria. Pt was lethargic and hypotensive  on arrival to ED. Pt received 2 L IVF, IV zosyn, and had ct abd and pelvis. Pt's BP better 104/49, MAP 79, HR 80, 99% O2 sat. CT abd pelvis showed: 1. Severe amount of stool is seen within the rectum, correlate for fecal impaction.  2. Colonic diverticulosis with questionable fat stranding along the descending colon which could be seen with acute diverticulitis.  Clinical correlation is required.  3. Abnormal elevation of left hemidiaphragm, correlate for diaphragmatic paralysis.  4. Refer to the body of report for other incidental findings.      Past Medical History:   Diagnosis Date    Anemia, unspecified     Anorexia     Blindness     Constipation      Coronary artery disease     Decubitus ulcer of heel 10/6/2023    GERD (gastroesophageal reflux disease)     Gout, unspecified     Heart failure, unspecified     hypertensive heart disease related    Hypertension     Insomnia     Intervertebral disc disorder with myelopathy, lumbar region     Mixed hyperlipidemia     Mixed hyperlipidemia     NSTEMI (non-ST elevated myocardial infarction)     Pruritus     Small bowel obstruction     Syndrome of inappropriate ADH (SIADH) secretion     Unspecified glaucoma     bilat.    Unspecified glaucoma        Past Surgical History:   Procedure Laterality Date    APPENDECTOMY      CHOLECYSTECTOMY      CORONARY ARTERY BYPASS GRAFT      HYSTERECTOMY      WOUND DEBRIDEMENT Right 10/8/2023    Procedure: DEBRIDEMENT, WOUND;  Surgeon: Jerod Toth MD;  Location: [facility] OR;  Service: General;  Laterality: Right;  HEEL       Review of patient's allergies indicates:   Allergen Reactions    Amlodipine     Coconut Nausea And Vomiting    Tree nuts Nausea And Vomiting     Patient reports vomiting with almonds and cashews.        No current facility-administered medications on file prior to encounter.     Current Outpatient Medications on File Prior to Encounter   Medication Sig    acetaminophen (TYLENOL EXTRA STRENGTH) 500 MG tablet Take 500 mg by mouth every 4 (four) hours as needed for Pain or Temperature greater than.    ascorbic acid, vitamin C, (VITAMIN C) 500 MG tablet Take 500 mg by mouth once daily.    aspirin (ECOTRIN) 81 MG EC tablet Take 1 tablet (81 mg total) by mouth once daily.    bisacodyL (DULCOLAX) 10 mg Supp Place 10 mg rectally daily as needed.    demeclocycline (DECLOMYCIN) 300 MG Tab Take 300 mg by mouth 3 (three) times daily.    dorzolamide-timolol 2-0.5% (COSOPT) 22.3-6.8 mg/mL ophthalmic solution Place 1 drop into both eyes 2 (two) times daily.    esomeprazole (NEXIUM) 40 MG capsule Take 20 mg by mouth before breakfast.    hydrOXYzine  pamoate (VISTARIL) 25 MG Cap Take 25 mg by mouth every 6 (six) hours as needed (ITCHING RELATED TO PRURITUS).    latanoprost 0.005 % ophthalmic solution Place 1 drop into both eyes every evening.    linaCLOtide (LINZESS) 145 mcg Cap capsule Take 145 mcg by mouth once daily.    magnesium oxide (MAG-OX) 400 mg (241.3 mg magnesium) tablet Take 400 mg by mouth once daily.    megestroL (MEGACE) 400 mg/10 mL (40 mg/mL) Susp Take 10 mLs by mouth 2 (two) times a day.    melatonin 10 mg Tab Take 5 mg by mouth every evening.    minoxidiL (LONITEN) 2.5 MG tablet Take 1 tablet (2.5 mg total) by mouth once daily.    multivitamin (ONE DAILY MULTIVITAMIN) per tablet Take 1 tablet by mouth once daily.    ondansetron (ZOFRAN) 8 MG tablet Take 8 mg by mouth every 6 (six) hours as needed for Nausea.    polyethylene glycol (GLYCOLAX) 17 gram PwPk Take 17 g by mouth once daily.    potassium chloride (MICRO-K) 10 MEQ CpSR Take 20 mEq by mouth once.    sacubitriL-valsartan (ENTRESTO)  mg per tablet Take 1 tablet by mouth 2 (two) times daily.    simvastatin (ZOCOR) 40 MG tablet Take 40 mg by mouth every evening.    temazepam (RESTORIL) 7.5 MG Cap Take 7.5 mg by mouth every evening.    torsemide (DEMADEX) 20 MG Tab Take 20 mg by mouth once daily.    vitamin E 400 UNIT capsule Take 400 Units by mouth once daily.    losartan (COZAAR) 25 MG tablet Take 25 mg by mouth once daily.     Family History    None       Tobacco Use    Smoking status: Never    Smokeless tobacco: Never   Substance and Sexual Activity    Alcohol use: Never    Drug use: Never    Sexual activity: Not Currently     Review of Systems   Constitutional:  Positive for fatigue.   HENT: Negative.     Eyes: Negative.    Respiratory: Negative.     Cardiovascular: Negative.    Gastrointestinal:  Positive for abdominal distention, abdominal pain and constipation.   Endocrine: Negative.    Genitourinary: Negative.    Musculoskeletal: Negative.    Skin:  "Negative.    Neurological: Negative.      Objective:     Vital Signs (Most Recent):  Pulse: 81 (11/02/23 1830)  Resp: 18 (11/02/23 1840)  BP: 110/62 (11/02/23 1830)  SpO2: 99 % (11/02/23 1830) Vital Signs (24h Range):  Pulse:  [58-97] 81  Resp:  [11-24] 18  SpO2:  [69 %-99 %] 99 %  BP: ()/() 110/62     Weight: 54.4 kg (120 lb)  Body mass index is 19.37 kg/m².     Physical Exam  Constitutional:       Comments: Gen weakness   HENT:      Head: Normocephalic.      Nose: Nose normal.   Eyes:      Conjunctiva/sclera: Conjunctivae normal.   Cardiovascular:      Rate and Rhythm: Normal rate and regular rhythm.      Pulses: Normal pulses.      Heart sounds: Normal heart sounds.   Pulmonary:      Effort: Pulmonary effort is normal.      Breath sounds: Normal breath sounds.   Abdominal:      Palpations: Abdomen is soft.      Tenderness: There is abdominal tenderness.   Musculoskeletal:      Cervical back: Neck supple.   Skin:     General: Skin is warm and dry.   Neurological:      Mental Status: She is alert and oriented to person, place, and time.                Significant Labs: All pertinent labs within the past 24 hours have been reviewed.    Significant Imaging: I have reviewed all pertinent imaging results/findings within the past 24 hours.    Assessment/Plan:     Diverticulitis  Pt started on IV zosyn  IVF's  Monitor labs daily      Severe sepsis  This patient does have evidence of infective focus  My overall impression is septic shock due to SBP < 90.  Source: Abdominal  Antibiotics given-   Antibiotics (72h ago, onward)    None        Latest lactate reviewed-  No results for input(s): "LACTATE" in the last 72 hours.  Organ dysfunction indicated by Acute kidney injury    Fluid challenge Actual Body weight- Patient will receive 30ml/kg actual body weight to calculate fluid bolus for treatment of septic shock.     Post- resuscitation assessment Yes Perfusion exam was performed within 6 hours of septic shock " presentation after bolus shows Adequate tissue perfusion assessed by non-invasive monitoring       Will Not start Pressors- Levophed for MAP of 65  Source control achieved by: IVF's, MAP up to 79  Continue with IVF's, admitted to ICU    Hyperkalemia   The likely etiology of the hyperkalemia is BETHANY.  The patients latest potassium has been reviewed and the results are listed below  Recent Labs   Lab 11/02/23  1533   K 5.9*     Recheck stat labs after hydration      Transaminitis  Likely due to sepsis  monitor      Elevated lipase  Recheck in am, likely due to diverticulitis/sepsis      Acute cystitis without hematuria  IVF's  Monitor labs closely      Constipation  Pt had 2 large BM's  resolved      CAD (coronary artery disease)  stable      VTE Risk Mitigation (From admission, onward)    None           Service was provided using HIPPA compliant web platform using SOC for audio/visual equipment  Patient Location: [facility]  Provider Location:Home  Participants on call: bedside RN, patient  Physician on call : Dr. Bowles    Code status: full code  DVT prophylaxis: lovenox        Marry Bowles DO  Department of Hospital Medicine  [facility]

## 2023-11-03 NOTE — H&P
Hospital Medicine  History & Physical Examination       Patient: Zackery Purdy  MRN: [id number]  STATUS: IP- Hospice   DOS: 11/3/2023   PCP: Tanner Moncada MD      CC: abdominal pain       HISTORY OF PRESENT ILLNESS     Principal Problem:<principal problem not specified>     Chief Complaint:           Chief Complaint   Patient presents with    Abdominal Pain       C/O FALL YESTERDAY AND SEEN IN ER, HEMATOMA TO HEAD, N/V, [facility] NURSE REPORTS PT IS IN EXTREME ABD PAIN, NOT EATING OR DRINKING, UPON ARRIVAL PT IS DIFFICULT TO AROUSE AND GIVEN 2 MG NARCAN VIA NASAL         HPI: Pt is a 89 yr old female from NH with PMH of HTN, blindness, CAD s/p NSTEMI, GERD, Gout, CHF, HLD, SIADH, decubitus ulcer of the heel,  was seen yesterday in the ED for a fall and hematoma to the head. CT head showed scalp cephalohematoma on right side. CT c spine showed no acute fracture or significant spondylolisthesis, no paravertebral hematomas, no focal tissue swelling. Pt returned today with c/o Abd pain, lower abd for 5 days associated with nausea and constipation. Pt denies cp, sob, fevers, chills, freq, urgency, or dysuria. Pt was lethargic and hypotensive  on arrival to ED. Pt received 2 L IVF, IV zosyn, and had ct abd and pelvis. Pt's BP better 104/49, MAP 79, HR 80, 99% O2 sat. CT abd pelvis showed: 1. Severe amount of stool is seen within the rectum, correlate for fecal impaction.  2. Colonic diverticulosis with questionable fat stranding along the descending colon which could be seen with acute diverticulitis.  Clinical correlation is required.  3. Abnormal elevation of left hemidiaphragm, correlate for diaphragmatic paralysis.  4. Refer to the body of report for other incidental findings.     Family has decided for hospice and comfort measures only.   admitted to [facility]    REVIEW OF SYSTEMS     Except as documented, all other systems reviewed and negative       PAST MEDICAL HISTORY     Past Medical History:   Diagnosis Date     Anemia, unspecified     Anorexia     Blindness     Constipation     Coronary artery disease     Decubitus ulcer of heel 10/6/2023    GERD (gastroesophageal reflux disease)     Gout, unspecified     Heart failure, unspecified     hypertensive heart disease related    Hypertension     Insomnia     Intervertebral disc disorder with myelopathy, lumbar region     Mixed hyperlipidemia     Mixed hyperlipidemia     NSTEMI (non-ST elevated myocardial infarction)     Pruritus     Small bowel obstruction     Syndrome of inappropriate ADH (SIADH) secretion     Unspecified glaucoma     bilat.    Unspecified glaucoma           PAST SURGICAL HISTORY     Past Surgical History:   Procedure Laterality Date    APPENDECTOMY      CHOLECYSTECTOMY      CORONARY ARTERY BYPASS GRAFT      HYSTERECTOMY      WOUND DEBRIDEMENT Right 10/8/2023    Procedure: DEBRIDEMENT, WOUND;  Surgeon: Jerod Toth MD;  Location: [facility] OR;  Service: General;  Laterality: Right;  HEEL          FAMILY HISTORY     Reviewed, negative in relation to patient's current condition.      SOCIAL HISTORY     Denies alcohol, tobacco or drug abuse    Social History     Tobacco Use    Smoking status: Never    Smokeless tobacco: Never   Substance Use Topics    Alcohol use: Never          ALLERGIES     Amlodipine, Coconut, and Tree nuts      HOME MEDICATIONS     Current Outpatient Medications   Medication Instructions    acetaminophen (TYLENOL EXTRA STRENGTH) 500 mg, Oral, Every 4 hours PRN    ascorbic acid (vitamin C) (VITAMIN C) 500 mg, Oral, Daily    aspirin (ECOTRIN) 81 mg, Oral, Daily    bisacodyL (DULCOLAX) 10 mg, Rectal, Daily PRN    demeclocycline (DECLOMYCIN) 300 mg, Oral, 3 times daily    dorzolamide-timolol 2-0.5% (COSOPT) 22.3-6.8 mg/mL ophthalmic solution 1 drop, Both Eyes, 2 times daily    esomeprazole (NEXIUM) 20 mg, Oral, Before breakfast    hydrOXYzine pamoate (VISTARIL) 25 mg, Oral, Every 6 hours PRN    latanoprost 0.005 % ophthalmic solution 1 drop,  "Both Eyes, Nightly    linaCLOtide (LINZESS) 145 mcg, Oral, Daily    losartan (COZAAR) 25 mg, Oral, Daily    magnesium oxide (MAG-OX) 400 mg, Oral, Daily    megestroL (MEGACE) 400 mg/10 mL (40 mg/mL) Susp 10 mLs, Oral, 2 times daily    melatonin 5 mg, Oral, Nightly    minoxidiL (LONITEN) 2.5 mg, Oral, Daily    multivitamin (ONE DAILY MULTIVITAMIN) per tablet 1 tablet, Oral, Daily    ondansetron (ZOFRAN) 8 mg, Oral, Every 6 hours PRN    polyethylene glycol (GLYCOLAX) 17 g, Oral, Daily    potassium chloride (MICRO-K) 10 MEQ CpSR 20 mEq, Oral, Once    sacubitriL-valsartan (ENTRESTO)  mg per tablet 1 tablet, Oral, 2 times daily    simvastatin (ZOCOR) 40 mg, Oral, Nightly    temazepam (RESTORIL) 7.5 mg, Oral, Nightly    torsemide (DEMADEX) 20 mg, Oral, Daily    vitamin E 400 Units, Oral, Daily          PHYSICAL EXAM   VITALS: T     BP     P     RR     O2      GENERAL: Awake and in NAD  HEENT: NC/AT, EOMI, PERRL.  NECK: Supple,  No JVD  LUNGS: CTA B/L  CVS: RRR, S1S2 positive  GI/: Soft, NT/ND, bowel sounds positive.  EXTREMITIES: Peripheral pulses 2+, no peripheral edema  DERM: Warm, dry.  No rashes or lesions noted.  NEURO: AAOx3, no focal neurologic deficit  PSYCH: Cooperative, appropriate mood and affect       DIAGNOSTICS     Recent Labs     11/02/23  1533   WBC 16.01*   RBC 3.34*   HGB 10.4*   HCT 30.4*   MCV 91.0   MCH 31.1   MCHC 34.2   RDW 17.2*        Recent Labs     11/02/23  1738 11/02/23 2016   LACTIC 3.3* 2.3*     No results for input(s): "INR", "APTT", "D-DIMER" in the last 72 hours.  No results for input(s): "HGBA1C", "CHOL", "TRIG", "LDL", "VLDL", "HDL" in the last 72 hours.   Recent Labs     11/02/23  1533 11/02/23 2016    138   K 5.9* 4.5   CHLORIDE 106 107   CO2 14* 18*   BUN 98.0* 89.0*   CREATININE 2.23* 2.24*   GLUCOSE 75 121*   CALCIUM 8.8 8.1*   ALBUMIN 2.5*  --    GLOBULIN 2.8  --    ALKPHOS 267*  --    ALT 66*  --    *  --    BILITOT 0.7  --    LIPASE 659*  --  "     Recent Labs     11/02/23  1533      TROPONINI 0.016          X-Ray Chest AP Portable  Narrative: EXAMINATION:  XR CHEST AP PORTABLE    CLINICAL HISTORY:  Cough, unspecified    COMPARISON:  07/12/2022    FINDINGS:  Single AP chest radiograph is provided for evaluation.    Cardiac silhouette is normal in size.    There is no focal consolidation, pneumothorax, or pleural effusion.    Postsurgical changes of sternotomy.  No acute osseous findings.    Redemonstrated abnormal elevation of left hemidiaphragm with gas-filled loops of bowel, correlate with patient's symptoms.  Impression: 1. No radiographic evidence of an acute cardiopulmonary process.  2. Gas-filled loops of bowel projecting over the left upper quadrant, correlate with patient's symptoms.    Electronically signed by: Mik Jose  Date:    11/02/2023  Time:    18:28  CT Abdomen Pelvis  Without Contrast  Narrative: EXAMINATION:  CT ABDOMEN AND PELVIS    CLINICAL HISTORY:  LLQ abdominal pain;    TECHNIQUE:  Axial CT images were obtained through the abdomen and pelvis without IV contrast.  Coronal and sagittal reconstructions submitted and interpreted. Automated exposure control utilized limiting radiation dose to the patient. .    COMPARISON:  None.    FINDINGS:  Visualized Thorax: The lung bases are clear.  Coronary artery calcifications are noted.  The heart is normal in size.  Calcifications of the aortic valve and mitral valve are seen.    Liver, Gallbladder, and Biliary System: The liver and biliary system are normal.  Postsurgical changes of cholecystectomy.    Spleen: Normal    Pancreas: Joan    Kidneys, Ureter, Bladder: Multiple nonobstructing stones are seen in bilateral kidneys.  No evidence of hydronephrosis or obstructive uropathy.  A Tolentino catheter is seen within the bladder with an air-fluid level, nonspecific.  Bladder wall thickening, correlate with urinalysis.    Adrenal Glands: Not well visualized.    Peritoneum: No free fluid,  free air, or inflammatory change.    GI Tract: Abnormal elevation left hemidiaphragm.  The appendix is normal.  Large amount of stool is seen within the rectum..  No evidence of bowel obstruction.    Reproductive Organs: The uterus is not definitively visualized.    Lymph Nodes: No lymphadenopathy.    Vascular: The abdominal aorta is normal in caliber with severe atherosclerotic disease..    Bones: Multilevel discogenic disease of the lumbar spine.  No acute osseous abnormality.    Soft Tissues: Normal.  Impression: 1. Severe amount of stool is seen within the rectum, correlate for fecal impaction.  2. Colonic diverticulosis with questionable fat stranding along the descending colon which could be seen with acute diverticulitis.  Clinical correlation is required.  3. Abnormal elevation of left hemidiaphragm, correlate for diaphragmatic paralysis.  4. Refer to the body of report for other incidental findings.    Electronically signed by: Mik Jose  Date:    11/02/2023  Time:    17:31  CT Head Without Contrast  Narrative: EXAMINATION:  CT HEAD WITHOUT CONTRAST    CLINICAL HISTORY:  Mental status change, unknown cause;    TECHNIQUE:  Low dose axial images were obtained through the head.  Coronal and sagittal reformations were also performed. Contrast was not administered.  Dose reduction techniques including automatic exposure control (AEC) were utilized.    COMPARISON:  CT head 11/01/2023.    FINDINGS:  INTRACRANIAL: Brain parenchyma demonstrates normal attenuation and configuration.  No acute intracranial hemorrhage.  No hydrocephalus.  No intracranial mass effect.    SINUSES: Visualized paranasal sinuses and mastoids are clear.    SKULL/SCALP: Soft tissue hematoma overlying the right parietal bone with no associated osseous finding.  Visualized osseous structures are normal.    ORBITS: Visualized orbits are normal.  Impression: No CT evidence of an acute intracranial process.  Right scalp hematoma as seen  "yesterday.    Electronically signed by: Mik Jose  Date:    11/02/2023  Time:    17:23        ASSESSMENT     Diverticulitis  Pt started on IV zosyn  IVF's  Monitor labs daily        Severe sepsis  This patient does have evidence of infective focus  My overall impression is septic shock due to SBP < 90.  Source: Abdominal  Antibiotics given-         Antibiotics (72h ago, onward)     None          Latest lactate reviewed-  No results for input(s): "LACTATE" in the last 72 hours.  Organ dysfunction indicated by Acute kidney injury     Fluid challenge Actual Body weight- Patient will receive 30ml/kg actual body weight to calculate fluid bolus for treatment of septic shock.      Post- resuscitation assessment Yes Perfusion exam was performed within 6 hours of septic shock presentation after bolus shows Adequate tissue perfusion assessed by non-invasive monitoring         Will Not start Pressors- Levophed for MAP of 65  Source control achieved by: IVF's, MAP up to 79  Continue with IVF's, admitted to ICU     Hyperkalemia   The likely etiology of the hyperkalemia is BETHANY.  The patients latest potassium has been reviewed and the results are listed below        Recent Labs   Lab 11/02/23  1533   K 5.9*      Recheck stat labs after hydration        Transaminitis  Likely due to sepsis  monitor        Elevated lipase  Recheck in am, likely due to diverticulitis/sepsis        Acute cystitis without hematuria  IVF's  Monitor labs closely        Constipation  Pt had 2 large BM's  resolved        CAD (coronary artery disease)  stable             Saad Lilly MD  Hospital Medicine        If the patient has been admitted under observation status, it is at my discretion and under our care with hospital medicine services. [TWA]    "

## 2023-11-03 NOTE — PLAN OF CARE
Problem: Adult Inpatient Plan of Care  Goal: Plan of Care Review  Outcome: Ongoing, Progressing  Goal: Patient-Specific Goal (Individualized)  Outcome: Ongoing, Progressing  Goal: Absence of Hospital-Acquired Illness or Injury  Outcome: Ongoing, Progressing  Goal: Optimal Comfort and Wellbeing  Outcome: Ongoing, Progressing  Goal: Readiness for Transition of Care  Outcome: Ongoing, Progressing     Problem: Adjustment to Illness (Sepsis/Septic Shock)  Goal: Optimal Coping  Outcome: Ongoing, Progressing     Problem: Bleeding (Sepsis/Septic Shock)  Goal: Absence of Bleeding  Outcome: Ongoing, Progressing     Problem: Glycemic Control Impaired (Sepsis/Septic Shock)  Goal: Blood Glucose Level Within Desired Range  Outcome: Ongoing, Progressing     Problem: Infection Progression (Sepsis/Septic Shock)  Goal: Absence of Infection Signs and Symptoms  Outcome: Ongoing, Progressing     Problem: Nutrition Impaired (Sepsis/Septic Shock)  Goal: Optimal Nutrition Intake  Outcome: Ongoing, Progressing     Problem: Fluid and Electrolyte Imbalance (Acute Kidney Injury/Impairment)  Goal: Fluid and Electrolyte Balance  Outcome: Ongoing, Progressing     Problem: Oral Intake Inadequate (Acute Kidney Injury/Impairment)  Goal: Optimal Nutrition Intake  Outcome: Ongoing, Progressing     Problem: Renal Function Impairment (Acute Kidney Injury/Impairment)  Goal: Effective Renal Function  Outcome: Ongoing, Progressing     Problem: Impaired Wound Healing  Goal: Optimal Wound Healing  Outcome: Ongoing, Progressing     Problem: Skin Injury Risk Increased  Goal: Skin Health and Integrity  Outcome: Ongoing, Progressing     Problem: Fall Injury Risk  Goal: Absence of Fall and Fall-Related Injury  Outcome: Ongoing, Progressing

## 2023-11-03 NOTE — NURSING
NADIYA NOTIFIED OF PT APPEARING TO OVERALL DECLINE. FIXED UPWARD GAZE, MOUTH OPEN, NO LONGER RESPONDING IN FULL SENTENCES. STATED SHE WILL LET FAMILY KNOW

## 2023-11-03 NOTE — NURSING
SPOKE WITH NADIYA TO CLARIFY COMFORT MEASURES ONLY. NADIYA CONFIRMED COMFORT MEASURES ONLY AND NO BP SUPPORT.

## 2023-11-03 NOTE — CONSULTS
Arrived to unit this am to assess patient.  Nurse, Karla Samson, RN stated patient will be going to inpatient hospice services today.  Stated wound care consult is no longer needed; hospice nurse will address wound care orders when entering [facility] hospice orders. Instructed to call if any needs arise.

## 2023-11-03 NOTE — PLAN OF CARE
11/03/23 1241   Discharge Assessment   Assessment Type Discharge Planning Assessment   Confirmed/corrected address, phone number and insurance Yes   Confirmed Demographics Correct on Facesheet   Source of Information health record   Communicated SANTINO with patient/caregiver Date not available/Unable to determine   Reason For Admission Severe Sepsis   People in Home facility resident   Facility Arrived From: [facility]   Prior to hospitilization cognitive status: Alert/Oriented   Current cognitive status: Unable to Assess;Coma/Sedated/Intubated   Walking or Climbing Stairs ambulation difficulty, dependent   Dressing/Bathing dressing difficulty, dependent   Equipment Currently Used at Home wheelchair;hospital bed   Readmission within 30 days? Yes  ([facility] Sepsis  10/6-10/12/23)   Do you take prescription medications? Yes   Do you have prescription coverage? Yes   Coverage MCR   Do you have any problems affording any of your prescribed medications? No   Is the patient taking medications as prescribed? yes   Do you take coumadin? No   DME Needed Upon Discharge  none   Discharge Plan discussed with: Adult children   Transition of Care Barriers None   Discharge Plan A Inpatient Hospice  ([facility] at [facility])     Patient was admitted to [facility] with [facility] for comfort care.

## 2023-11-03 NOTE — NURSING
0850 - DR. DOUGHERTY ROUNDED PLAN OF CARE REVIEWED WITH FAMILY. ADELINA REDMOND STATES FAMILY WANTS COMFORT CARE, INPATIENT HOSPICE CONSULT IN PLACE [facility] HOSPICE NOTIFIED PER CASE MANAGEMENT.  0855 ANGEL GARCIA WITH [facility] HOSPICE PRESENT TO SPEAK WITH FAMILY.  1010 - SATINDER FLOYD WITH [facility] HOSPICE PRESENT FOR ADMISSION TO INPATIENT HOSPICE.  1030- ORDERS FOR INPATIENT HOSPICE SIGNED.  1100- REPORT GIVEN TO SATINDER GRANDA TOWER 3 FOR TRANSITION OF CARE. PATIENT ASSIGNED TO ROOM 307.

## 2023-11-04 NOTE — PROVIDER PROGRESS NOTES - EMERGENCY DEPT.
Encounter Date: 11/3/2023    ED Physician Progress Notes        Called for patient pronouncement.  She was a DNR.  Patient was found pulseless, apneic, with fixed dilated pupils.  She was pronounced  at 4:17 a.m.

## 2023-11-07 LAB
BACTERIA BLD CULT: NORMAL
BACTERIA BLD CULT: NORMAL

## 2023-11-10 NOTE — DISCHARGE SUMMARY
"Hospital Medicine  Discharge Summary    Patient Name: Zackery Purdy  MRN: [id number]  Admit Date: 11/3/2023  Discharge Date: 11/4/2023   Status: IP- Hospice   Length of Stay: 1      PHYSICIANS   Admitting Physician: Saad Lilly MD  Discharging Physician: Saad Lilly MD.  Primary Care Physician: Tanner Moncada MD        DISCHARGE DIAGNOSES   Diverticulitis  Pt started on IV zosyn  IVF's  Monitor labs daily        Severe sepsis  This patient does have evidence of infective focus  My overall impression is septic shock due to SBP < 90.  Source: Abdominal  Antibiotics given-         Antibiotics (72h ago, onward)     None          Latest lactate reviewed-  No results for input(s): "LACTATE" in the last 72 hours.  Organ dysfunction indicated by Acute kidney injury     Fluid challenge Actual Body weight- Patient will receive 30ml/kg actual body weight to calculate fluid bolus for treatment of septic shock.      Post- resuscitation assessment Yes Perfusion exam was performed within 6 hours of septic shock presentation after bolus shows Adequate tissue perfusion assessed by non-invasive monitoring         Will Not start Pressors- Levophed for MAP of 65  Source control achieved by: IVF's, MAP up to 79  Continue with IVF's, admitted to ICU     Hyperkalemia   The likely etiology of the hyperkalemia is BETHANY.  The patients latest potassium has been reviewed and the results are listed below        Recent Labs   Lab 11/02/23  1533   K 5.9*      Recheck stat labs after hydration        Transaminitis  Likely due to sepsis  monitor        Elevated lipase  Recheck in am, likely due to diverticulitis/sepsis        Acute cystitis without hematuria  IVF's  Monitor labs closely        Constipation  Pt had 2 large BM's  resolved        CAD (coronary artery disease)  stable              PROCEDURES         HOSPITAL COURSE    Principal Problem:<principal problem not specified>     Chief Complaint:           Chief Complaint "   Patient presents with    Abdominal Pain       C/O FALL YESTERDAY AND SEEN IN ER, HEMATOMA TO HEAD, N/V, [facility] NURSE REPORTS PT IS IN EXTREME ABD PAIN, NOT EATING OR DRINKING, UPON ARRIVAL PT IS DIFFICULT TO AROUSE AND GIVEN 2 MG NARCAN VIA NASAL         HPI: Pt is a 89 yr old female from NH with PMH of HTN, blindness, CAD s/p NSTEMI, GERD, Gout, CHF, HLD, SIADH, decubitus ulcer of the heel,  was seen yesterday in the ED for a fall and hematoma to the head. CT head showed scalp cephalohematoma on right side. CT c spine showed no acute fracture or significant spondylolisthesis, no paravertebral hematomas, no focal tissue swelling. Pt returned today with c/o Abd pain, lower abd for 5 days associated with nausea and constipation. Pt denies cp, sob, fevers, chills, freq, urgency, or dysuria. Pt was lethargic and hypotensive  on arrival to ED. Pt received 2 L IVF, IV zosyn, and had ct abd and pelvis. Pt's BP better 104/49, MAP 79, HR 80, 99% O2 sat. CT abd pelvis showed: 1. Severe amount of stool is seen within the rectum, correlate for fecal impaction.  2. Colonic diverticulosis with questionable fat stranding along the descending colon which could be seen with acute diverticulitis.  Clinical correlation is required.  3. Abnormal elevation of left hemidiaphragm, correlate for diaphragmatic paralysis.  4. Refer to the body of report for other incidental findings.     Family has decided for hospice and comfort measures only.   admitted to [facility]    She was pronounced  at 4:17 a.m.        STATUS  Improved, Stable    DISPOSITION  Discharge to [facility] released to  home    DIET      ACTIVITY  As tolerated      FOLLOW-UP         DISCHARGE MEDICATION RECONCILIATION        Medication List        ASK your doctor about these medications      aspirin 81 MG EC tablet  Commonly known as: ECOTRIN  Take 1 tablet (81 mg total) by mouth once daily.     bisacodyL 10 mg Supp  Commonly known as: DULCOLAX     demeclocycline 300  "MG Tab  Commonly known as: DECLOMYCIN     dorzolamide-timolol 2-0.5% 22.3-6.8 mg/mL ophthalmic solution  Commonly known as: COSOPT     ENTRESTO  mg per tablet  Generic drug: sacubitriL-valsartan     esomeprazole 40 MG capsule  Commonly known as: NEXIUM     hydrOXYzine pamoate 25 MG Cap  Commonly known as: VISTARIL     latanoprost 0.005 % ophthalmic solution     LINZESS 145 mcg Cap capsule  Generic drug: linaCLOtide     losartan 25 MG tablet  Commonly known as: COZAAR     magnesium oxide 400 mg (241.3 mg magnesium) tablet  Commonly known as: MAG-OX     megestroL 400 mg/10 mL (40 mg/mL) Susp  Commonly known as: MEGACE     melatonin 10 mg Tab     minoxidiL 2.5 MG tablet  Commonly known as: LONITEN  Take 1 tablet (2.5 mg total) by mouth once daily.     ondansetron 8 MG tablet  Commonly known as: ZOFRAN     ONE DAILY MULTIVITAMIN per tablet  Generic drug: multivitamin     polyethylene glycol 17 gram Pwpk  Commonly known as: GLYCOLAX     potassium chloride 10 MEQ Cpsr  Commonly known as: MICRO-K     simvastatin 40 MG tablet  Commonly known as: ZOCOR     temazepam 7.5 MG Cap  Commonly known as: RESTORIL     torsemide 20 MG Tab  Commonly known as: DEMADEX     TYLENOL EXTRA STRENGTH 500 MG tablet  Generic drug: acetaminophen     VITAMIN C 500 MG tablet  Generic drug: ascorbic acid (vitamin C)     vitamin E 400 UNIT capsule                PHYSICAL EXAM   VITALS: T 97.1 °F (36.2 °C)   BP (!) 67/42   P (!) 0   RR (!) 0   O2 (!) 92 %            Discharge time: 33 minutes     Saad Lilly MD  [facility]       DIAGNOSITCS   CBC:   No results for input(s): "WBC", "HGB", "HCT", "PLT", "NEUTOPHILPCT", "LYMPH", "MONO", "EOSINOPHIL" in the last 168 hours.  COAG:  No results for input(s): "APTT", "INR", "PTT" in the last 168 hours.  CMP: No results for input(s): "GLU", "CALCIUM", "ALBUMIN", "PROT", "NA", "K", "CO2", "CL", "BUN", "CREATININE", "ALKPHOS", "ALT", "AST", "BILITOT", "MG", "PHOS" in the last 168 " "hours.  CrCl cannot be calculated (Patient's most recent lab result is older than the maximum 7 days allowed.).    CARDIAC ENZYMES: No results for input(s): "TROPONINI", "CPK", "CKMB" in the last 72 hours.     No results for input(s): "AMYLASE", "LIPASE" in the last 168 hours.    No results for input(s): "POCTGLUCOSE" in the last 72 hours.     Microbiology Results (last 7 days)       ** No results found for the last 168 hours. **               X-Ray Chest AP Portable    Result Date: 11/2/2023  EXAMINATION: XR CHEST AP PORTABLE CLINICAL HISTORY: Cough, unspecified COMPARISON: 07/12/2022 FINDINGS: Single AP chest radiograph is provided for evaluation. Cardiac silhouette is normal in size. There is no focal consolidation, pneumothorax, or pleural effusion. Postsurgical changes of sternotomy.  No acute osseous findings. Redemonstrated abnormal elevation of left hemidiaphragm with gas-filled loops of bowel, correlate with patient's symptoms.     1. No radiographic evidence of an acute cardiopulmonary process. 2. Gas-filled loops of bowel projecting over the left upper quadrant, correlate with patient's symptoms. Electronically signed by: Mik Jose Date:    11/02/2023 Time:    18:28    CT Abdomen Pelvis  Without Contrast    Result Date: 11/2/2023  EXAMINATION: CT ABDOMEN AND PELVIS CLINICAL HISTORY: LLQ abdominal pain; TECHNIQUE: Axial CT images were obtained through the abdomen and pelvis without IV contrast.  Coronal and sagittal reconstructions submitted and interpreted. Automated exposure control utilized limiting radiation dose to the patient. . COMPARISON: None. FINDINGS: Visualized Thorax: The lung bases are clear.  Coronary artery calcifications are noted.  The heart is normal in size.  Calcifications of the aortic valve and mitral valve are seen. Liver, Gallbladder, and Biliary System: The liver and biliary system are normal.  Postsurgical changes of cholecystectomy. Spleen: Normal Pancreas: Joan Kidneys, " Ureter, Bladder: Multiple nonobstructing stones are seen in bilateral kidneys.  No evidence of hydronephrosis or obstructive uropathy.  A Tolentino catheter is seen within the bladder with an air-fluid level, nonspecific.  Bladder wall thickening, correlate with urinalysis. Adrenal Glands: Not well visualized. Peritoneum: No free fluid, free air, or inflammatory change. GI Tract: Abnormal elevation left hemidiaphragm.  The appendix is normal.  Large amount of stool is seen within the rectum..  No evidence of bowel obstruction. Reproductive Organs: The uterus is not definitively visualized. Lymph Nodes: No lymphadenopathy. Vascular: The abdominal aorta is normal in caliber with severe atherosclerotic disease.. Bones: Multilevel discogenic disease of the lumbar spine.  No acute osseous abnormality. Soft Tissues: Normal.     1. Severe amount of stool is seen within the rectum, correlate for fecal impaction. 2. Colonic diverticulosis with questionable fat stranding along the descending colon which could be seen with acute diverticulitis.  Clinical correlation is required. 3. Abnormal elevation of left hemidiaphragm, correlate for diaphragmatic paralysis. 4. Refer to the body of report for other incidental findings. Electronically signed by: Mik Jose Date:    11/02/2023 Time:    17:31    CT Head Without Contrast    Result Date: 11/2/2023  EXAMINATION: CT HEAD WITHOUT CONTRAST CLINICAL HISTORY: Mental status change, unknown cause; TECHNIQUE: Low dose axial images were obtained through the head.  Coronal and sagittal reformations were also performed. Contrast was not administered.  Dose reduction techniques including automatic exposure control (AEC) were utilized. COMPARISON: CT head 11/01/2023. FINDINGS: INTRACRANIAL: Brain parenchyma demonstrates normal attenuation and configuration.  No acute intracranial hemorrhage.  No hydrocephalus.  No intracranial mass effect. SINUSES: Visualized paranasal sinuses and mastoids  are clear. SKULL/SCALP: Soft tissue hematoma overlying the right parietal bone with no associated osseous finding.  Visualized osseous structures are normal. ORBITS: Visualized orbits are normal.     No CT evidence of an acute intracranial process.  Right scalp hematoma as seen yesterday. Electronically signed by: Mik Jose Date:    11/02/2023 Time:    17:23    CT Cervical Spine Without Contrast    Result Date: 11/1/2023  EXAMINATION: CT CERVICAL SPINE WITHOUT CONTRAST CLINICAL HISTORY: Neck pain, acute, no red flags; TECHNIQUE: Low dose axial images, sagittal and coronal reformations were performed though the cervical spine.  Contrast was not administered. COMPARISON: None FINDINGS: Multiplanar imaging through the  cervical spine without intravenous contrast was performed.  It should be noted the posterior margins of the intervertebral disks as well as the  cord, exiting nerve roots and ligamentous structures are less than optimally delineated on CT and would be better evaluated with MRI, if felt to be clinically indicated.  The sagittal images show minimal a retrolisthesis of C3 on C4 and C4 on C5 which is most likely related to ligamentous laxity associated with the patient's degenerative disease.  I see no evidence of acute fractures or significant spondylolisthesis.  There is no evidence of focal soft tissue swelling or paravertebral hematomas. C2-C3: There is no evidence of significant focal disc bulging or herniation at this level and the vertebral canal and neural foramina appear adequately patent. C3-C5: There is significant degenerative disease with disc space narrowing, vertebral body and facet joint spurring with degenerative bony narrowing of the foramina.  There is also AP narrowing of the central canal, more evident at C4-5 and appears chronic, related to the degenerative disease with the retrolisthesis. C5-C6: There is narrowing of the posterior aspect of the disc space with vertebral body and  facet joint spurring with some bony narrowing of the foramen. C6-C7: There is no evidence of significant focal disc bulging or herniation at this  level and the vertebral canal and neural foramina appear adequately patent. C7-T1: There is no evidence of significant focal disc bulging or herniation at  this level and the vertebral canal and neural foramina appear adequately patent.     1.  MULTILEVEL DJD AS DESCRIBED ABOVE WITH MILD CHRONIC APPEARING RETROLISTHESIS OF C3 ON C4 AND C4 ON C5 WHICH APPEARS TO BE RELATED TO LIGAMENTOUS LAXITY ASSOCIATED WITH THE PATIENT'S DEGENERATIVE DISEASE.  THE FINDINGS ARE MOST PRONOUNCED AT C4-5.  SEE ABOVE COMMENTS REGARDING LIMITATIONS OF THIS EXAMINATION. n/a CATEGORY: n/a The following dose reduction techniques are used for all CT at [facility]: 1.  Automated exposure control. 2.  Adjustment of the mA and/or kV according to patient size. 3.  Use of interative reconstruction technique. Electronically signed by: Liborio Torres Date:    11/01/2023 Time:    14:16    CT Head Without Contrast    Result Date: 11/1/2023  EXAMINATION: CT HEAD WITHOUT CONTRAST CLINICAL HISTORY: head trauma 65 and older; TECHNIQUE: Low dose axial images were obtained through the head.  Coronal and sagittal reformations were also performed. Contrast was not administered. COMPARISON: None. FINDINGS: Multiplanar imaging through the head/brain was performed.A scalp cephalohematoma is present on the right side.  Moderate generalized atrophy with at least mild chronic ischemic disease involving the periventricular deep white matter.  I see no intraparynchymal masses, hemorhagic lesions, or dominant wedge shaped infarcts. I see no extraxial masses or abnormal fluid collections.     1.  Right-sided scalp cephalohematoma.  No acute intracranial abnormality. n/a Category: n/a The following dose reduction techniques are used for all CT at [facility]: 1.   Automated  exposure control. 2.   Adjustment of the mA and/or kV according to patient size. 3.   Use of iterative reconstruction technique. Electronically signed by: Liborio Torres Date:    11/01/2023 Time:    14:06    Radiology US Lower Extremity Arteries Bilateral    Result Date: 10/25/2023  EXAMINATION: US LOWER EXTREMITY ARTERIES BILATERAL CLINICAL HISTORY: Pain in leg, unspecified TECHNIQUE: Bilateral spectral, color and grayscale images of the large arteries of both lower extremities were performed. COMPARISON: None FINDINGS: On the right side the common femoral artery shows monophasic flow.  This is suggestive of a more proximal lesion.  There is monophasic flow in the profundus femoris and throughout the superficial femoral artery.  A definite focal stenosis is not seen however there is dense calcification.  There is monophasic flow in the popliteal artery in the peroneal artery the anterior and posterior tibial artery. On the left side the common femoral artery shows biphasic flow again suggestive of a more proximal lesion.  There is biphasic flow in the profundus femoris and proximal superficial femoral.  Monophasic flow in the mid and distal SFA the popliteal, peroneal high anterior posterior tibial arteries     Significant disease bilaterally would be suspicious for more proximal lesions bilaterally right greater than left.  The flow was markedly decreased on the right side of moderately to severely decreased on the left. Electronically signed by: Justice Morrow MD Date:    10/25/2023 Time:    15:14

## 2023-11-10 NOTE — DISCHARGE SUMMARY
"Hospital Medicine  Discharge Summary    Patient Name: Zackery Purdy  MRN: [id number]  Admit Date: 11/2/2023  Discharge Date: 11/3/2023   Status: IP- Inpatient   Length of Stay: 1      PHYSICIANS   Admitting Physician: Saad Lilly MD  Discharging Physician: Saad Lilly MD.  Primary Care Physician: Tanner Moncada MD        DISCHARGE DIAGNOSES     Diverticulitis  Pt started on IV zosyn  IVF's  Monitor labs daily        Severe sepsis  This patient does have evidence of infective focus  My overall impression is septic shock due to SBP < 90.  Source: Abdominal  Antibiotics given-       Antibiotics (72h ago, onward)     None          Latest lactate reviewed-  No results for input(s): "LACTATE" in the last 72 hours.  Organ dysfunction indicated by Acute kidney injury     Fluid challenge Actual Body weight- Patient will receive 30ml/kg actual body weight to calculate fluid bolus for treatment of septic shock.      Post- resuscitation assessment Yes Perfusion exam was performed within 6 hours of septic shock presentation after bolus shows Adequate tissue perfusion assessed by non-invasive monitoring         Will Not start Pressors- Levophed for MAP of 65  Source control achieved by: IVF's, MAP up to 79  Continue with IVF's, admitted to ICU     Hyperkalemia   The likely etiology of the hyperkalemia is BETHANY.  The patients latest potassium has been reviewed and the results are listed below      Recent Labs   Lab 11/02/23  1533   K 5.9*      Recheck stat labs after hydration        Transaminitis  Likely due to sepsis  monitor        Elevated lipase  Recheck in am, likely due to diverticulitis/sepsis        Acute cystitis without hematuria  IVF's  Monitor labs closely        Constipation  Pt had 2 large BM's  resolved        CAD (coronary artery disease)  stable         PROCEDURES         HOSPITAL COURSE    Principal Problem:<principal problem not specified>     Chief Complaint:        Chief Complaint   Patient " presents with    Abdominal Pain       C/O FALL YESTERDAY AND SEEN IN ER, HEMATOMA TO HEAD, N/V, [facility] NURSE REPORTS PT IS IN EXTREME ABD PAIN, NOT EATING OR DRINKING, UPON ARRIVAL PT IS DIFFICULT TO AROUSE AND GIVEN 2 MG NARCAN VIA NASAL         HPI: Pt is a 89 yr old female from [facility] with PMH of HTN, blindness, CAD s/p NSTEMI, GERD, Gout, CHF, HLD, SIADH, decubitus ulcer of the heel,  was seen yesterday in the ED for a fall and hematoma to the head. CT head showed scalp cephalohematoma on right side. CT c spine showed no acute fracture or significant spondylolisthesis, no paravertebral hematomas, no focal tissue swelling. Pt returned today with c/o Abd pain, lower abd for 5 days associated with nausea and constipation. Pt denies cp, sob, fevers, chills, freq, urgency, or dysuria. Pt was lethargic and hypotensive  on arrival to ED. Pt received 2 L IVF, IV zosyn, and had ct abd and pelvis. Pt's BP better 104/49, MAP 79, HR 80, 99% O2 sat. CT abd pelvis showed: 1. Severe amount of stool is seen within the rectum, correlate for fecal impaction.  2. Colonic diverticulosis with questionable fat stranding along the descending colon which could be seen with acute diverticulitis.  Clinical correlation is required.  3. Abnormal elevation of left hemidiaphragm, correlate for diaphragmatic paralysis.  4. Refer to the body of report for other incidental findings.    Family has decided for hospice and comfort measures only.    STATUS      DISPOSITION  Discharge to inpatient hospice     DIET      ACTIVITY  As tolerated      FOLLOW-UP         DISCHARGE MEDICATION RECONCILIATION        Medication List        ASK your doctor about these medications      aspirin 81 MG EC tablet  Commonly known as: ECOTRIN  Take 1 tablet (81 mg total) by mouth once daily.     bisacodyL 10 mg Supp  Commonly known as: DULCOLAX     demeclocycline 300 MG Tab  Commonly known as: DECLOMYCIN     dorzolamide-timolol 2-0.5% 22.3-6.8 mg/mL ophthalmic  "solution  Commonly known as: COSOPT     ENTRESTO  mg per tablet  Generic drug: sacubitriL-valsartan     esomeprazole 40 MG capsule  Commonly known as: NEXIUM     hydrOXYzine pamoate 25 MG Cap  Commonly known as: VISTARIL     latanoprost 0.005 % ophthalmic solution     LINZESS 145 mcg Cap capsule  Generic drug: linaCLOtide     losartan 25 MG tablet  Commonly known as: COZAAR     magnesium oxide 400 mg (241.3 mg magnesium) tablet  Commonly known as: MAG-OX     megestroL 400 mg/10 mL (40 mg/mL) Susp  Commonly known as: MEGACE     melatonin 10 mg Tab     minoxidiL 2.5 MG tablet  Commonly known as: LONITEN  Take 1 tablet (2.5 mg total) by mouth once daily.     ondansetron 8 MG tablet  Commonly known as: ZOFRAN     ONE DAILY MULTIVITAMIN per tablet  Generic drug: multivitamin     polyethylene glycol 17 gram Pwpk  Commonly known as: GLYCOLAX     potassium chloride 10 MEQ Cpsr  Commonly known as: MICRO-K     simvastatin 40 MG tablet  Commonly known as: ZOCOR     temazepam 7.5 MG Cap  Commonly known as: RESTORIL     torsemide 20 MG Tab  Commonly known as: DEMADEX     TYLENOL EXTRA STRENGTH 500 MG tablet  Generic drug: acetaminophen     VITAMIN C 500 MG tablet  Generic drug: ascorbic acid (vitamin C)     vitamin E 400 UNIT capsule                PHYSICAL EXAM   VITALS: T 99.3 °F (37.4 °C)   BP (!) 77/39   P 97   RR 10   O2 (!) 94 %    Physical Exam  Vitals reviewed.   Constitutional:       Comments: Rest comfortable in bed no distress   Cardiovascular:      Rate and Rhythm: Normal rate.   Pulmonary:      Effort: Pulmonary effort is normal.              Discharge time: 33 minutes     Saad Lilly MD  [facility]       DIAGNOSITCS   CBC:   No results for input(s): "WBC", "HGB", "HCT", "PLT", "NEUTOPHILPCT", "LYMPH", "MONO", "EOSINOPHIL" in the last 168 hours.  COAG:  No results for input(s): "APTT", "INR", "PTT" in the last 168 hours.  CMP: No results for input(s): "GLU", "CALCIUM", "ALBUMIN", "PROT", "NA", " ""K", "CO2", "CL", "BUN", "CREATININE", "ALKPHOS", "ALT", "AST", "BILITOT", "MG", "PHOS" in the last 168 hours.  CrCl cannot be calculated (Patient's most recent lab result is older than the maximum 7 days allowed.).    CARDIAC ENZYMES: No results for input(s): "TROPONINI", "CPK", "CKMB" in the last 72 hours.     No results for input(s): "AMYLASE", "LIPASE" in the last 168 hours.    No results for input(s): "POCTGLUCOSE" in the last 72 hours.     Microbiology Results (last 7 days)       ** No results found for the last 168 hours. **               X-Ray Chest AP Portable    Result Date: 11/2/2023  EXAMINATION: XR CHEST AP PORTABLE CLINICAL HISTORY: Cough, unspecified COMPARISON: 07/12/2022 FINDINGS: Single AP chest radiograph is provided for evaluation. Cardiac silhouette is normal in size. There is no focal consolidation, pneumothorax, or pleural effusion. Postsurgical changes of sternotomy.  No acute osseous findings. Redemonstrated abnormal elevation of left hemidiaphragm with gas-filled loops of bowel, correlate with patient's symptoms.     1. No radiographic evidence of an acute cardiopulmonary process. 2. Gas-filled loops of bowel projecting over the left upper quadrant, correlate with patient's symptoms. Electronically signed by: Mik Jose Date:    11/02/2023 Time:    18:28    CT Abdomen Pelvis  Without Contrast    Result Date: 11/2/2023  EXAMINATION: CT ABDOMEN AND PELVIS CLINICAL HISTORY: LLQ abdominal pain; TECHNIQUE: Axial CT images were obtained through the abdomen and pelvis without IV contrast.  Coronal and sagittal reconstructions submitted and interpreted. Automated exposure control utilized limiting radiation dose to the patient. . COMPARISON: None. FINDINGS: Visualized Thorax: The lung bases are clear.  Coronary artery calcifications are noted.  The heart is normal in size.  Calcifications of the aortic valve and mitral valve are seen. Liver, Gallbladder, and Biliary System: The liver and " biliary system are normal.  Postsurgical changes of cholecystectomy. Spleen: Normal Pancreas: Joan Kidneys, Ureter, Bladder: Multiple nonobstructing stones are seen in bilateral kidneys.  No evidence of hydronephrosis or obstructive uropathy.  A Tolentino catheter is seen within the bladder with an air-fluid level, nonspecific.  Bladder wall thickening, correlate with urinalysis. Adrenal Glands: Not well visualized. Peritoneum: No free fluid, free air, or inflammatory change. GI Tract: Abnormal elevation left hemidiaphragm.  The appendix is normal.  Large amount of stool is seen within the rectum..  No evidence of bowel obstruction. Reproductive Organs: The uterus is not definitively visualized. Lymph Nodes: No lymphadenopathy. Vascular: The abdominal aorta is normal in caliber with severe atherosclerotic disease.. Bones: Multilevel discogenic disease of the lumbar spine.  No acute osseous abnormality. Soft Tissues: Normal.     1. Severe amount of stool is seen within the rectum, correlate for fecal impaction. 2. Colonic diverticulosis with questionable fat stranding along the descending colon which could be seen with acute diverticulitis.  Clinical correlation is required. 3. Abnormal elevation of left hemidiaphragm, correlate for diaphragmatic paralysis. 4. Refer to the body of report for other incidental findings. Electronically signed by: Mik Jose Date:    11/02/2023 Time:    17:31    CT Head Without Contrast    Result Date: 11/2/2023  EXAMINATION: CT HEAD WITHOUT CONTRAST CLINICAL HISTORY: Mental status change, unknown cause; TECHNIQUE: Low dose axial images were obtained through the head.  Coronal and sagittal reformations were also performed. Contrast was not administered.  Dose reduction techniques including automatic exposure control (AEC) were utilized. COMPARISON: CT head 11/01/2023. FINDINGS: INTRACRANIAL: Brain parenchyma demonstrates normal attenuation and configuration.  No acute intracranial  hemorrhage.  No hydrocephalus.  No intracranial mass effect. SINUSES: Visualized paranasal sinuses and mastoids are clear. SKULL/SCALP: Soft tissue hematoma overlying the right parietal bone with no associated osseous finding.  Visualized osseous structures are normal. ORBITS: Visualized orbits are normal.     No CT evidence of an acute intracranial process.  Right scalp hematoma as seen yesterday. Electronically signed by: Mik Jose Date:    11/02/2023 Time:    17:23    CT Cervical Spine Without Contrast    Result Date: 11/1/2023  EXAMINATION: CT CERVICAL SPINE WITHOUT CONTRAST CLINICAL HISTORY: Neck pain, acute, no red flags; TECHNIQUE: Low dose axial images, sagittal and coronal reformations were performed though the cervical spine.  Contrast was not administered. COMPARISON: None FINDINGS: Multiplanar imaging through the  cervical spine without intravenous contrast was performed.  It should be noted the posterior margins of the intervertebral disks as well as the  cord, exiting nerve roots and ligamentous structures are less than optimally delineated on CT and would be better evaluated with MRI, if felt to be clinically indicated.  The sagittal images show minimal a retrolisthesis of C3 on C4 and C4 on C5 which is most likely related to ligamentous laxity associated with the patient's degenerative disease.  I see no evidence of acute fractures or significant spondylolisthesis.  There is no evidence of focal soft tissue swelling or paravertebral hematomas. C2-C3: There is no evidence of significant focal disc bulging or herniation at this level and the vertebral canal and neural foramina appear adequately patent. C3-C5: There is significant degenerative disease with disc space narrowing, vertebral body and facet joint spurring with degenerative bony narrowing of the foramina.  There is also AP narrowing of the central canal, more evident at C4-5 and appears chronic, related to the degenerative disease with  the retrolisthesis. C5-C6: There is narrowing of the posterior aspect of the disc space with vertebral body and facet joint spurring with some bony narrowing of the foramen. C6-C7: There is no evidence of significant focal disc bulging or herniation at this  level and the vertebral canal and neural foramina appear adequately patent. C7-T1: There is no evidence of significant focal disc bulging or herniation at  this level and the vertebral canal and neural foramina appear adequately patent.     1.  MULTILEVEL DJD AS DESCRIBED ABOVE WITH MILD CHRONIC APPEARING RETROLISTHESIS OF C3 ON C4 AND C4 ON C5 WHICH APPEARS TO BE RELATED TO LIGAMENTOUS LAXITY ASSOCIATED WITH THE PATIENT'S DEGENERATIVE DISEASE.  THE FINDINGS ARE MOST PRONOUNCED AT C4-5.  SEE ABOVE COMMENTS REGARDING LIMITATIONS OF THIS EXAMINATION. n/a CATEGORY: n/a The following dose reduction techniques are used for all CT at [facility]: 1.  Automated exposure control. 2.  Adjustment of the mA and/or kV according to patient size. 3.  Use of interative reconstruction technique. Electronically signed by: Liborio Torres Date:    11/01/2023 Time:    14:16    CT Head Without Contrast    Result Date: 11/1/2023  EXAMINATION: CT HEAD WITHOUT CONTRAST CLINICAL HISTORY: head trauma 65 and older; TECHNIQUE: Low dose axial images were obtained through the head.  Coronal and sagittal reformations were also performed. Contrast was not administered. COMPARISON: None. FINDINGS: Multiplanar imaging through the head/brain was performed.A scalp cephalohematoma is present on the right side.  Moderate generalized atrophy with at least mild chronic ischemic disease involving the periventricular deep white matter.  I see no intraparynchymal masses, hemorhagic lesions, or dominant wedge shaped infarcts. I see no extraxial masses or abnormal fluid collections.     1.  Right-sided scalp cephalohematoma.  No acute intracranial abnormality. n/a Category: n/a The  following dose reduction techniques are used for all CT at [facility]: 1.   Automated exposure control. 2.   Adjustment of the mA and/or kV according to patient size. 3.   Use of iterative reconstruction technique. Electronically signed by: Liborio Torres Date:    11/01/2023 Time:    14:06    Radiology US Lower Extremity Arteries Bilateral    Result Date: 10/25/2023  EXAMINATION: US LOWER EXTREMITY ARTERIES BILATERAL CLINICAL HISTORY: Pain in leg, unspecified TECHNIQUE: Bilateral spectral, color and grayscale images of the large arteries of both lower extremities were performed. COMPARISON: None FINDINGS: On the right side the common femoral artery shows monophasic flow.  This is suggestive of a more proximal lesion.  There is monophasic flow in the profundus femoris and throughout the superficial femoral artery.  A definite focal stenosis is not seen however there is dense calcification.  There is monophasic flow in the popliteal artery in the peroneal artery the anterior and posterior tibial artery. On the left side the common femoral artery shows biphasic flow again suggestive of a more proximal lesion.  There is biphasic flow in the profundus femoris and proximal superficial femoral.  Monophasic flow in the mid and distal SFA the popliteal, peroneal high anterior posterior tibial arteries     Significant disease bilaterally would be suspicious for more proximal lesions bilaterally right greater than left.  The flow was markedly decreased on the right side of moderately to severely decreased on the left. Electronically signed by: Justice Morrow MD Date:    10/25/2023 Time:    15:14

## (undated) DEVICE — BLADE SURG #15 CARBON STEEL

## (undated) DEVICE — BANDAGE MATRIX HK LOOP 6IN 5YD

## (undated) DEVICE — GOWN POLY REINF BRTH SLV 2XL

## (undated) DEVICE — BANDAGE LITE GZ STRL 6INX4.5YD

## (undated) DEVICE — SOCKINETTE IMPERVIOUS 12X48IN

## (undated) DEVICE — GOWN IMPERVIOUS FILM UNIVERSAL

## (undated) DEVICE — SPONGE GAUZE 4X4 12 PLY STRL

## (undated) DEVICE — GLOVE PROTEXIS PI SYN SURG 6.5

## (undated) DEVICE — GAUZE DERMACEA 3 PLY 4IN 4YD

## (undated) DEVICE — NDL SAFETY STD LUER 22GX1.5IN

## (undated) DEVICE — BLADE SURG CARBON STEEL #10

## (undated) DEVICE — SYR 10CC LUER LOCK

## (undated) DEVICE — DRAPE EXTREMITY HVY DTY REINF